# Patient Record
Sex: FEMALE | Race: WHITE | NOT HISPANIC OR LATINO | Employment: FULL TIME | URBAN - METROPOLITAN AREA
[De-identification: names, ages, dates, MRNs, and addresses within clinical notes are randomized per-mention and may not be internally consistent; named-entity substitution may affect disease eponyms.]

---

## 2017-01-24 ENCOUNTER — GENERIC CONVERSION - ENCOUNTER (OUTPATIENT)
Dept: OTHER | Facility: OTHER | Age: 57
End: 2017-01-24

## 2017-02-20 ENCOUNTER — ALLSCRIPTS OFFICE VISIT (OUTPATIENT)
Dept: OTHER | Facility: OTHER | Age: 57
End: 2017-02-20

## 2017-03-03 ENCOUNTER — GENERIC CONVERSION - ENCOUNTER (OUTPATIENT)
Dept: OTHER | Facility: OTHER | Age: 57
End: 2017-03-03

## 2017-03-29 ENCOUNTER — TRANSCRIBE ORDERS (OUTPATIENT)
Dept: ADMINISTRATIVE | Facility: HOSPITAL | Age: 57
End: 2017-03-29

## 2017-03-29 ENCOUNTER — APPOINTMENT (OUTPATIENT)
Dept: LAB | Facility: MEDICAL CENTER | Age: 57
End: 2017-03-29
Payer: COMMERCIAL

## 2017-03-29 DIAGNOSIS — M81.8 IDIOPATHIC OSTEOPOROSIS: Primary | ICD-10-CM

## 2017-03-29 DIAGNOSIS — M81.8 IDIOPATHIC OSTEOPOROSIS: ICD-10-CM

## 2017-03-29 LAB
25(OH)D3 SERPL-MCNC: 18.5 NG/ML (ref 30–100)
ALBUMIN SERPL BCP-MCNC: 4.1 G/DL (ref 3.5–5)
ALP SERPL-CCNC: 80 U/L (ref 46–116)
ALT SERPL W P-5'-P-CCNC: 26 U/L (ref 12–78)
ANION GAP SERPL CALCULATED.3IONS-SCNC: 6 MMOL/L (ref 4–13)
AST SERPL W P-5'-P-CCNC: 20 U/L (ref 5–45)
BILIRUB SERPL-MCNC: 0.64 MG/DL (ref 0.2–1)
BUN SERPL-MCNC: 20 MG/DL (ref 5–25)
CALCIUM SERPL-MCNC: 9.1 MG/DL (ref 8.3–10.1)
CHLORIDE SERPL-SCNC: 102 MMOL/L (ref 100–108)
CO2 SERPL-SCNC: 33 MMOL/L (ref 21–32)
CREAT SERPL-MCNC: 0.94 MG/DL (ref 0.6–1.3)
GFR SERPL CREATININE-BSD FRML MDRD: >60 ML/MIN/1.73SQ M
GLUCOSE SERPL-MCNC: 85 MG/DL (ref 65–140)
POTASSIUM SERPL-SCNC: 4.5 MMOL/L (ref 3.5–5.3)
PROT SERPL-MCNC: 7.7 G/DL (ref 6.4–8.2)
PTH-INTACT SERPL-MCNC: 33.4 PG/ML (ref 14–72)
SODIUM SERPL-SCNC: 141 MMOL/L (ref 136–145)
TSH SERPL DL<=0.05 MIU/L-ACNC: 3.81 UIU/ML (ref 0.36–3.74)

## 2017-03-29 PROCEDURE — 36415 COLL VENOUS BLD VENIPUNCTURE: CPT

## 2017-03-29 PROCEDURE — 83970 ASSAY OF PARATHORMONE: CPT

## 2017-03-29 PROCEDURE — 84443 ASSAY THYROID STIM HORMONE: CPT

## 2017-03-29 PROCEDURE — 80053 COMPREHEN METABOLIC PANEL: CPT

## 2017-03-29 PROCEDURE — 82306 VITAMIN D 25 HYDROXY: CPT

## 2017-04-21 ENCOUNTER — ALLSCRIPTS OFFICE VISIT (OUTPATIENT)
Dept: OTHER | Facility: OTHER | Age: 57
End: 2017-04-21

## 2017-07-21 ENCOUNTER — TRANSCRIBE ORDERS (OUTPATIENT)
Dept: ADMINISTRATIVE | Facility: HOSPITAL | Age: 57
End: 2017-07-21

## 2017-07-21 ENCOUNTER — APPOINTMENT (OUTPATIENT)
Dept: LAB | Facility: MEDICAL CENTER | Age: 57
End: 2017-07-21
Payer: COMMERCIAL

## 2017-07-21 DIAGNOSIS — Z00.8 ENCOUNTER FOR OTHER GENERAL EXAMINATION: Primary | ICD-10-CM

## 2017-07-21 DIAGNOSIS — Z00.8 ENCOUNTER FOR OTHER GENERAL EXAMINATION: ICD-10-CM

## 2017-07-21 LAB
CHOLEST SERPL-MCNC: 264 MG/DL (ref 50–200)
EST. AVERAGE GLUCOSE BLD GHB EST-MCNC: 123 MG/DL
HBA1C MFR BLD: 5.9 % (ref 4.2–6.3)
HDLC SERPL-MCNC: 107 MG/DL (ref 40–60)
LDLC SERPL CALC-MCNC: 144 MG/DL (ref 0–100)
TRIGL SERPL-MCNC: 66 MG/DL

## 2017-07-21 PROCEDURE — 83036 HEMOGLOBIN GLYCOSYLATED A1C: CPT | Performed by: PREVENTIVE MEDICINE

## 2017-07-21 PROCEDURE — 80061 LIPID PANEL: CPT

## 2017-07-21 PROCEDURE — 36415 COLL VENOUS BLD VENIPUNCTURE: CPT | Performed by: PREVENTIVE MEDICINE

## 2018-01-10 NOTE — PROGRESS NOTES
Assessment    1  Acute bronchitis (466 0) (J20 9)    Plan  Acute bronchitis    · Azithromycin 250 MG Oral Tablet; TAKE 2 TABLETS ON DAY 1 THEN TAKE 1  TABLET A DAY FOR 9 days   · PredniSONE 10 MG Oral Tablet; Take 3 po bid for 2 days , then 2 po bid for 2  days, then 1 po bid for 2 days, then 1 qd for 2 days and stop   · Promethazine-DM 6 25-15 MG/5ML Oral Syrup; Take 1 teaspoonful every 6 hours  as needed    Discussion/Summary    If patient is not feeling better in 72 hours, she is to call  Possible side effects of new medications were reviewed with the patient/guardian today  The treatment plan was reviewed with the patient/guardian  The patient/guardian understands and agrees with the treatment plan      Chief Complaint  COUGH/FEVER      History of Present Illness  HPI: 3 days of cough, dry , + chest tightness, fever and sore throat      Review of Systems    Constitutional: fever, but No fever, no chills, feels well, no tiredness, no recent weight gain or loss  ENT: sore throat, but no ear ache, no loss of hearing, no nosebleeds or nasal discharge, no sore throat or hoarseness  Cardiovascular: no complaints of slow or fast heart rate, no chest pain, no palpitations, no leg claudication or lower extremity edema  Respiratory: cough and dry cough and chest tightness, but no complaints of shortness of breath, no wheezing, no dyspnea on exertion, no orthopnea or PND  Breasts: no complaints of breast pain, breast lump or nipple discharge  Gastrointestinal: no complaints of abdominal pain, no constipation, no nausea or diarrhea, no vomiting, no bloody stools  Genitourinary: no complaints of dysuria, no incontinence, no pelvic pain, no dysmenorrhea, no vaginal discharge or abnormal vaginal bleeding  Musculoskeletal: no complaints of arthralgia, no myalgia, no joint swelling or stiffness, no limb pain or swelling     Integumentary: no complaints of skin rash or lesion, no itching or dry skin, no skin wounds  Neurological: no complaints of headache, no confusion, no numbness or tingling, no dizziness or fainting  Active Problems    1  Acute bronchitis (466 0) (J20 9)   2  Acute dysfunction of left Eustachian tube (381 81) (H69 82)   3  Acute sinusitis (461 9) (J01 90)   4  Anxiety (300 00) (F41 9)   5  Aphthous ulcer (528 2) (K12 0)   6  Arthralgia (719 40) (M25 50)   7  Cough, persistent (786 2) (R05)   8  Fatigue (780 79) (R53 83)   9  Fracture of right foot (825 20) (S92 901A)   10  Hypothyroidism (244 9) (E03 9)   11  Myalgia (729 1) (M79 1)   12  Osteoporosis (733 00) (M81 0)   13  Rhus dermatitis (692 6) (L23 7)   14  Right shoulder tendinitis (726 10) (M75 81)   15  Thyroiditis (245 9) (E06 9)   16  Thyromegaly (240 9) (E04 9)   17  Vaginal candidiasis (112 1) (B37 3)   18  Visit for pre-operative examination (V72 84) (Z01 818)   19  Vitamin D deficiency (268 9) (E55 9)    Past Medical History    1  History of Cataract, right (366 9) (H26 9)  Active Problems And Past Medical History Reviewed: The active problems and past medical history were reviewed and updated today  Family History    1  Family history of cardiac disorder (V17 49) (Z82 49)    2  Family history of Colon Cancer (V16 0)   3  Family history of Heart Disease (V17 49)  Family History Reviewed: The family history was reviewed and updated today  Social History    · Alcohol Use (History)   · Daily Coffee Consumption (___ Cups/Day)   · Denied: History of Daily Cola Consumption (___ Cans/Day)   · Denied: History of Daily Tea Consumption (___ Cups/Day)   · Never A Smoker  The social history was reviewed and updated today  The social history was reviewed and is unchanged  Surgical History    1  History of Eye Surgery   2  History of Foot Surgery   3  History of Jaw Surgery   4  History of Tonsillectomy  Surgical History Reviewed: The surgical history was reviewed and updated today  Current Meds   1   BD Pen Needle Mini U/F 31G X 5 MM Miscellaneous; USE AS DIRECTED; Therapy: [id number] to (Allen Solders)  Requested for: 05KXW6123; Last   Rx:07Jan2016 Ordered   2  Forteo 600 MCG/2 4ML Subcutaneous Solution; INJECT 20MCG SUBCUTANEOUSLY   ONE TIME DAILY (DISCARD PEN 28 DAYS AFTERINITIAL USE); Therapy: [id number] to (Last Rx:07Jan2016) Ordered   3  Levothyroxine Sodium 125 MCG Oral Tablet; Take 1 tablet daily Recorded   4  Sertraline HCl - 100 MG Oral Tablet; take one tablet by mouth every day; Therapy: [id number] to (Last Rx:19Mar2015)  Requested for: 94ZNE7042 Ordered    The medication list was reviewed and updated today  Allergies    1  No Known Drug Allergies    Vitals   Recorded: 27EXM0059 10:50AM   Temperature 101 4 F    Heart Rate 100    Respiration 16    Blood Pressure 72 mm Hg 118 mm Hg   Height 5 ft 2 5 in    Weight 145 lb     BMI Calculated 26 1 kg/m2    BSA Calculated 1 68 m2      Physical Exam    Constitutional   General appearance: No acute distress, well appearing and well nourished  Eyes   Conjunctiva and lids: No swelling, erythema or discharge  Pupils and irises: Equal, round and reactive to light  Ears, Nose, Mouth, and Throat   External inspection of ears and nose: Normal     Otoscopic examination: Abnormal   TM are dull and erythematous bilaterally  Nasal mucosa, septum, and turbinates: Abnormal   + turbine injection  Oropharynx: Abnormal   positive erythema the posterior pharynx with postnasal drip  Pulmonary   Respiratory effort: No increased work of breathing or signs of respiratory distress  Auscultation of lungs: Abnormal   get or rhonchi bilaterally  Cardiovascular   Auscultation of heart: Normal rate and rhythm, normal S1 and S2, without murmurs  Examination of extremities for edema and/or varicosities: Normal     Abdomen   Liver and spleen: No hepatomegaly or splenomegaly      Lymphatic   Palpation of lymph nodes in neck: Abnormal   positive anterior cervical lymphadenopathy  Musculoskeletal   Digits and nails: Normal without clubbing or cyanosis  Skin   Skin and subcutaneous tissue: Normal without rashes or lesions  Neurologic   Reflexes: 2+ and symmetric      Psychiatric   Orientation to person, place, and time: Normal     Mood and affect: Normal          Results/Data  PHQ-2 Adult Depression Screening [id number] 10:54AM User, s     Test Name Result Flag Reference   PHQ-2 Adult Depression Score 0     Q1: 0, Q2: 0   PHQ-2 Adult Depression Screening Negative         Signatures   Electronically signed by : Mat Thomas DO; Feb  3 2016 12:58PM EST                       (Author)

## 2018-01-12 VITALS
WEIGHT: 140 LBS | HEIGHT: 63 IN | TEMPERATURE: 98.1 F | HEART RATE: 68 BPM | DIASTOLIC BLOOD PRESSURE: 70 MMHG | BODY MASS INDEX: 24.8 KG/M2 | SYSTOLIC BLOOD PRESSURE: 116 MMHG

## 2018-01-12 VITALS
DIASTOLIC BLOOD PRESSURE: 80 MMHG | HEART RATE: 70 BPM | HEIGHT: 63 IN | WEIGHT: 138.13 LBS | SYSTOLIC BLOOD PRESSURE: 128 MMHG | RESPIRATION RATE: 16 BRPM | TEMPERATURE: 96.7 F | BODY MASS INDEX: 24.47 KG/M2

## 2018-02-20 ENCOUNTER — OFFICE VISIT (OUTPATIENT)
Dept: FAMILY MEDICINE CLINIC | Facility: CLINIC | Age: 58
End: 2018-02-20
Payer: COMMERCIAL

## 2018-02-20 VITALS
DIASTOLIC BLOOD PRESSURE: 72 MMHG | BODY MASS INDEX: 24.17 KG/M2 | TEMPERATURE: 98.6 F | WEIGHT: 141.6 LBS | HEART RATE: 78 BPM | SYSTOLIC BLOOD PRESSURE: 110 MMHG | HEIGHT: 64 IN

## 2018-02-20 DIAGNOSIS — H69.83 EUSTACHIAN TUBE DYSFUNCTION, BILATERAL: ICD-10-CM

## 2018-02-20 DIAGNOSIS — H69.83 EUSTACHIAN TUBE DYSFUNCTION, BILATERAL: Primary | ICD-10-CM

## 2018-02-20 PROCEDURE — 99213 OFFICE O/P EST LOW 20 MIN: CPT | Performed by: FAMILY MEDICINE

## 2018-02-20 RX ORDER — DOXYCYCLINE HYCLATE 100 MG/1
100 CAPSULE ORAL EVERY 12 HOURS SCHEDULED
Qty: 20 CAPSULE | Refills: 0 | Status: SHIPPED | OUTPATIENT
Start: 2018-02-20 | End: 2018-02-20 | Stop reason: SDUPTHER

## 2018-02-20 RX ORDER — PREDNISONE 10 MG/1
TABLET ORAL
Qty: 26 TABLET | Refills: 0 | Status: SHIPPED | OUTPATIENT
Start: 2018-02-20 | End: 2018-02-20 | Stop reason: SDUPTHER

## 2018-02-20 RX ORDER — DOXYCYCLINE HYCLATE 100 MG/1
100 CAPSULE ORAL EVERY 12 HOURS SCHEDULED
Qty: 20 CAPSULE | Refills: 0 | Status: SHIPPED | OUTPATIENT
Start: 2018-02-20 | End: 2018-03-02

## 2018-02-20 RX ORDER — PREDNISONE 10 MG/1
TABLET ORAL
Qty: 26 TABLET | Refills: 0 | Status: SHIPPED | OUTPATIENT
Start: 2018-02-20 | End: 2018-05-04 | Stop reason: ALTCHOICE

## 2018-02-20 NOTE — PROGRESS NOTES
Patient ID: Julio Cesar Sanders is a 62 y o  female  HPI: 62 y  o female presenting with symptoms of ear pressure, crackling of ears and dizziness associated with positional changes  She ws seen at a walk in center a week ago and given flonase and told to ttake an otc antihistamine with little relief  SUBJECTIVE    No family history on file  Social History     Social History    Marital status: /Civil Union     Spouse name: N/A    Number of children: N/A    Years of education: N/A     Occupational History    Not on file  Social History Main Topics    Smoking status: Not on file    Smokeless tobacco: Not on file    Alcohol use Not on file    Drug use: Unknown    Sexual activity: Not on file     Other Topics Concern    Not on file     Social History Narrative    No narrative on file     No past medical history on file  No past surgical history on file    No Known Allergies    Current Outpatient Prescriptions:     levothyroxine 125 mcg tablet, Take 125 mcg by mouth , Disp: , Rfl:     sertraline (ZOLOFT) 100 mg tablet, Take 100 mg by mouth , Disp: , Rfl:     doxycycline hyclate (VIBRAMYCIN) 100 mg capsule, Take 1 capsule (100 mg total) by mouth every 12 (twelve) hours for 10 days, Disp: 20 capsule, Rfl: 0    predniSONE 10 mg tablet, 3 tabs po bid x2 days, then 2 tabs po bid x2 days, then 1 tab bid x2 days, then 1 daily until done , Disp: 26 tablet, Rfl: 0    Review of Systems  Constitutional:     Denies fever, chills, fatigue, weakness ,weight loss, weight gain       ENT: Denies earache, loss of hearing, nosebleed, nasal discharge,nasal congestion, sore throat,hoarseness, but complains of ear pressure,and crackling    Pulmonary: Denies shortness of breath ,cough , dyspnea on exertionon, orthopnea , PND   Cardiovascular:  Denies bradycardia , tachycardia ,palpations, lower extremity, edema leg, claudication  Breast:  Denies new or changing breast lumps,  nipple discharge, nipple changes,  Abdomen:  Denies abdominal pain , anorexia ,indigestion, nausea ,vomiting, constipation , diarrhea  Musculoskeletal: Denies myalgias, arthralgias, joint swelling, joint stiffness ,limb pain, limb swelling  Lymph:denies swollen glands  Gu: no dysuria or urinary frequency  Skin: Denies skin rash, skin lesion, skin wound, itching,dry skin  Neuro: Denies headache, numbness, tingling, confusion, loss of consciousness, but complains of postitional vertigo  Psychiatric: Denies feelings of depression, suicidal ideation, anxiety, sleep disturbances    OBJECTIVE    Constitutional:   NAD, well appearing and well nourished      ENT:   Conjunctiva and lids: no injection, edema, or discharge     Pupils and iris: LEXIE bilaterally    External inspection of ears and nose: normal without deformities or discharge  Otoscopic exam: Canals patent with tm dull, Right tm with  Erythema and  large effusions noted bilaterally  ENasal mucosa, septum and turbinates: Turbinae injection with discharge   Oropharynx:  Moist mucosa, normal tongue and tonsils without lesions  Erythema and injection  of post pharynx with pnd      Pulmonary:Respiratory effort normal rate and rhythm, no increased work of breathing   Auscultation of lungs:  Clear bilaterally with no adventitious breath sounds       Cardiovascular: regular rate and rhythm, S1 and S2, no murmur, no edema and/or varicosities of LE      Abdomen: Soft and non-distended     Positive bowel sounds      No heptomegaly or splenomegaly      Lymphatic:+ Anterior cervical lymphadenopathy         Muscskeletal:  Gait and station: Normal gait      Digits and nails normal without clubbing or cyanosis       Inspection/palpation of joints, bones, and muscles:  No joint tenderness, swelling, full active and passive range of motion       Gu: no suprabubic tenderness, CVA tenderness or urethral discharge  Skin: Normal skin turgor and no rashes      Neuro:       Normal reflexes       Psych: alert and oriented to person, place and time     normal mood and affect       Assessment/Plan:Diagnoses and all orders for this visit:    Eustachian tube dysfunction, bilateral  -     Discontinue: predniSONE 10 mg tablet; 3 tabs po bid x2 days, then 2 tabs po bid x2 days, then 1 tab bid x2 days, then 1 daily until done  -     Discontinue: doxycycline hyclate (VIBRAMYCIN) 100 mg capsule; Take 1 capsule (100 mg total) by mouth every 12 (twelve) hours for 10 days        Reviewed with patient plan to treat with prednisone and doxycyline      Patient instructed to call in 72 hours if not feeling better or if symptoms worsen

## 2018-03-18 DIAGNOSIS — E03.9 HYPOTHYROIDISM, UNSPECIFIED TYPE: Primary | ICD-10-CM

## 2018-03-18 RX ORDER — LEVOTHYROXINE SODIUM 0.12 MG/1
TABLET ORAL
Qty: 90 TABLET | Refills: 0 | Status: SHIPPED | OUTPATIENT
Start: 2018-03-18 | End: 2018-03-19 | Stop reason: SDUPTHER

## 2018-03-19 DIAGNOSIS — E03.9 HYPOTHYROIDISM, UNSPECIFIED TYPE: ICD-10-CM

## 2018-03-19 RX ORDER — LEVOTHYROXINE SODIUM 0.12 MG/1
TABLET ORAL
Qty: 90 TABLET | Refills: 0 | Status: SHIPPED | OUTPATIENT
Start: 2018-03-19 | End: 2018-11-26 | Stop reason: SDUPTHER

## 2018-04-21 ENCOUNTER — OFFICE VISIT (OUTPATIENT)
Dept: URGENT CARE | Facility: MEDICAL CENTER | Age: 58
End: 2018-04-21
Payer: COMMERCIAL

## 2018-04-21 VITALS
OXYGEN SATURATION: 100 % | WEIGHT: 144 LBS | HEART RATE: 87 BPM | DIASTOLIC BLOOD PRESSURE: 72 MMHG | BODY MASS INDEX: 25.11 KG/M2 | RESPIRATION RATE: 16 BRPM | SYSTOLIC BLOOD PRESSURE: 116 MMHG | TEMPERATURE: 97.9 F

## 2018-04-21 DIAGNOSIS — J06.9 ACUTE URI: Primary | ICD-10-CM

## 2018-04-21 PROCEDURE — S9088 SERVICES PROVIDED IN URGENT: HCPCS | Performed by: PHYSICIAN ASSISTANT

## 2018-04-21 PROCEDURE — 99203 OFFICE O/P NEW LOW 30 MIN: CPT | Performed by: PHYSICIAN ASSISTANT

## 2018-04-21 RX ORDER — AZITHROMYCIN 250 MG/1
TABLET, FILM COATED ORAL
Qty: 6 TABLET | Refills: 0 | Status: SHIPPED | OUTPATIENT
Start: 2018-04-21 | End: 2018-04-25

## 2018-04-21 NOTE — PROGRESS NOTES
[facility]        NAME: Didier Hoffman is a 62 y o  female  : 1960    MRN: [id number]    Assessment and Plan   Acute URI [J06 9]  1  Acute URI  azithromycin (ZITHROMAX) 250 mg tablet         Patient Instructions     Take antibiotic as directed  Eat yogurt to avoid GI upset  Take claritin as directed  Increase fluid intake  Use humidifier in bedroom  Follow up with PCP in 3-5 days  Proceed to  ER if symptoms worsen  Chief Complaint     Chief Complaint   Patient presents with    Cough     course with production that started on monday          History of Present Illness       Cough   This is a new problem  The current episode started in the past 7 days  The problem has been unchanged  The problem occurs constantly  The cough is productive of sputum  Associated symptoms include ear congestion, nasal congestion, postnasal drip, rhinorrhea and a sore throat  Pertinent negatives include no chest pain, chills, ear pain, fever, headaches, myalgias, rash, shortness of breath or wheezing  The symptoms are aggravated by lying down  Treatments tried: antihistamines  The treatment provided mild relief  Her past medical history is significant for bronchitis  There is no history of asthma, bronchiectasis, COPD, emphysema, environmental allergies or pneumonia  Review of Systems   Review of Systems   Constitutional: Negative for chills, fatigue and fever  HENT: Positive for postnasal drip, rhinorrhea and sore throat  Negative for congestion, ear pain, hearing loss, sinus pain and sinus pressure  Eyes: Negative for pain and discharge  Respiratory: Positive for cough  Negative for chest tightness, shortness of breath and wheezing  Cardiovascular: Negative for chest pain  Gastrointestinal: Negative for abdominal pain, constipation, nausea and vomiting  Genitourinary: Negative for difficulty urinating  Musculoskeletal: Negative for arthralgias and myalgias  Skin: Negative for rash  Allergic/Immunologic: Negative for environmental allergies  Neurological: Negative for dizziness and headaches  Psychiatric/Behavioral: Negative for behavioral problems  Current Medications       Current Outpatient Prescriptions:     levothyroxine 125 mcg tablet, TAKE ONE TABLET BY MOUTH EVERY DAY, Disp: 90 tablet, Rfl: 0    sertraline (ZOLOFT) 100 mg tablet, Take 100 mg by mouth , Disp: , Rfl:     azithromycin (ZITHROMAX) 250 mg tablet, Take 2 tablets today then 1 tablet daily x 4 days, Disp: 6 tablet, Rfl: 0    predniSONE 10 mg tablet, 3 tabs po bid x2 days, then 2 tabs po bid x2 days, then 1 tab bid x2 days, then 1 daily until done , Disp: 26 tablet, Rfl: 0    Current Allergies     Allergies as of 04/21/2018    (No Known Allergies)            The following portions of the patient's history were reviewed and updated as appropriate: allergies, current medications, past family history, past medical history, past social history, past surgical history and problem list      Past Medical History:   Diagnosis Date    Detached retina, right     OCD (obsessive compulsive disorder)     Seasonal allergies     Wrist fracture        Past Surgical History:   Procedure Laterality Date    CATARACT EXTRACTION      TONSILLECTOMY         No family history on file  Medications have been verified  Objective   /72   Pulse 87   Temp 97 9 °F (36 6 °C) (Temporal)   Resp 16   Wt 65 3 kg (144 lb)   SpO2 100%   BMI 25 11 kg/m²        Physical Exam     Physical Exam   Constitutional: She is oriented to person, place, and time  She appears well-developed and well-nourished  HENT:   Right Ear: Tympanic membrane and external ear normal    Left Ear: Tympanic membrane and external ear normal    Nose: Mucosal edema and rhinorrhea present  Right sinus exhibits frontal sinus tenderness  Right sinus exhibits no maxillary sinus tenderness   Left sinus exhibits no maxillary sinus tenderness and no frontal sinus tenderness  Mouth/Throat: Uvula is midline and mucous membranes are normal  Posterior oropharyngeal edema and posterior oropharyngeal erythema present  No oropharyngeal exudate or tonsillar abscesses  Neck: Normal range of motion  No edema present  Cardiovascular: Normal rate, regular rhythm, S1 normal, S2 normal and normal heart sounds  No murmur heard  Pulmonary/Chest: Effort normal and breath sounds normal  No respiratory distress  She has no wheezes  She has no rales  She exhibits no tenderness  Lymphadenopathy:     She has no cervical adenopathy  Neurological: She is alert and oriented to person, place, and time  Skin: Skin is warm, dry and intact  No rash noted  Psychiatric: She has a normal mood and affect  Her speech is normal and behavior is normal    Nursing note and vitals reviewed

## 2018-04-21 NOTE — PATIENT INSTRUCTIONS
Take antibiotic as directed  Eat yogurt to avoid GI upset  Take claritin as directed  Increase fluid intake  Use humidifier in bedroom  Upper Respiratory Infection   WHAT YOU NEED TO KNOW:   An upper respiratory infection is also called the common cold  It is an infection that can affect your nose, throat, ears, and sinuses  For healthy people, the common cold is usually not serious and does not need special treatment  Cold symptoms are usually worst for the first 3 to 5 days  Most people get better in 7 to 14 days  You may continue to cough for 2 to 3 weeks  Colds are caused by viruses and do not get better with antibiotics  DISCHARGE INSTRUCTIONS:   Return to the emergency department if:   · You have chest pain or trouble breathing  Contact your healthcare provider if:   · You have a fever over 102ºF (39°C)  · Your sore throat gets worse or you see white or yellow spots in your throat  · Your symptoms get worse after 3 to 5 days or your cold is not better in 14 days  · You have a rash anywhere on your skin  · You have large, tender lumps in your neck  · You have thick, green or yellow drainage from your nose  · You cough up thick yellow, green, or bloody mucus  · You have vomiting for more than 24 hours and cannot keep fluids down  · You have a bad earache  · You have questions or concerns about your condition or care  Medicines: You may need any of the following:  · Decongestants  help reduce nasal congestion and help you breathe more easily  If you take decongestant pills, they may make you feel restless or cause problems with your sleep  Do not use decongestant sprays for more than a few days  · Cough suppressants  help reduce coughing  Ask your healthcare provider which type of cough medicine is best for you  · NSAIDs , such as ibuprofen, help decrease swelling, pain, and fever  NSAIDs can cause stomach bleeding or kidney problems in certain people   If you take blood thinner medicine, always ask your healthcare provider if NSAIDs are safe for you  Always read the medicine label and follow directions  · Acetaminophen  decreases pain and fever  It is available without a doctor's order  Ask how much to take and how often to take it  Follow directions  Read the labels of all other medicines you are using to see if they also contain acetaminophen, or ask your doctor or pharmacist  Acetaminophen can cause liver damage if not taken correctly  Do not use more than 4 grams (4,000 milligrams) total of acetaminophen in one day  · Take your medicine as directed  Contact your healthcare provider if you think your medicine is not helping or if you have side effects  Tell him or her if you are allergic to any medicine  Keep a list of the medicines, vitamins, and herbs you take  Include the amounts, and when and why you take them  Bring the list or the pill bottles to follow-up visits  Carry your medicine list with you in case of an emergency  Follow up with your healthcare provider as directed:  Write down your questions so you remember to ask them during your visits  Self-care:   · Rest as much as possible  Slowly start to do more each day  · Drink more liquids as directed  Liquids will help thin and loosen mucus so you can cough it up  Liquids will also help prevent dehydration  Liquids that help prevent dehydration include water, fruit juice, and broth  Do not drink liquids that contain caffeine  Caffeine can increase your risk for dehydration  Ask your healthcare provider how much liquid to drink each day  · Soothe a sore throat  Gargle with warm salt water  This helps your sore throat feel better  Make salt water by dissolving ¼ teaspoon salt in 1 cup warm water  You may also suck on hard candy or throat lozenges  You may use a sore throat spray  · Use a humidifier or vaporizer    Use a cool mist humidifier or a vaporizer to increase air moisture in your home  This may make it easier for you to breathe and help decrease your cough  · Use saline nasal drops as directed  These help relieve congestion  · Apply petroleum-based jelly around the outside of your nostrils  This can decrease irritation from blowing your nose  · Do not smoke  Nicotine and other chemicals in cigarettes and cigars can make your symptoms worse  They can also cause infections such as bronchitis or pneumonia  Ask your healthcare provider for information if you currently smoke and need help to quit  E-cigarettes or smokeless tobacco still contain nicotine  Talk to your healthcare provider before you use these products  Prevent spreading your cold to others:   · Try to stay away from other people during the first 2 to 3 days of your cold when it is more easily spread  · Do not share food or drinks  · Do not share hand towels with household members  · Wash your hands often, especially after you blow your nose  Turn away from other people and cover your mouth and nose with a tissue when you sneeze or cough  © 2017 [facility] Information is for End User's use only and may not be sold, redistributed or otherwise used for commercial purposes  All illustrations and images included in CareNotes® are the copyrighted property of [employer]  or [employer]  The above information is an  only  It is not intended as medical advice for individual conditions or treatments  Talk to your doctor, nurse or pharmacist before following any medical regimen to see if it is safe and effective for you

## 2018-04-28 DIAGNOSIS — F32.A DEPRESSION, UNSPECIFIED DEPRESSION TYPE: Primary | ICD-10-CM

## 2018-04-29 RX ORDER — SERTRALINE HYDROCHLORIDE 100 MG/1
TABLET, FILM COATED ORAL
Qty: 90 TABLET | Refills: 0 | Status: SHIPPED | OUTPATIENT
Start: 2018-04-29 | End: 2018-04-30 | Stop reason: SDUPTHER

## 2018-04-30 DIAGNOSIS — F32.A DEPRESSION, UNSPECIFIED DEPRESSION TYPE: ICD-10-CM

## 2018-04-30 RX ORDER — SERTRALINE HYDROCHLORIDE 100 MG/1
TABLET, FILM COATED ORAL
Qty: 90 TABLET | Refills: 0 | Status: SHIPPED | OUTPATIENT
Start: 2018-04-30 | End: 2018-12-20 | Stop reason: SDUPTHER

## 2018-05-02 ENCOUNTER — TELEPHONE (OUTPATIENT)
Dept: FAMILY MEDICINE CLINIC | Facility: CLINIC | Age: 58
End: 2018-05-02

## 2018-05-04 ENCOUNTER — OFFICE VISIT (OUTPATIENT)
Dept: FAMILY MEDICINE CLINIC | Facility: CLINIC | Age: 58
End: 2018-05-04
Payer: COMMERCIAL

## 2018-05-04 VITALS
SYSTOLIC BLOOD PRESSURE: 102 MMHG | TEMPERATURE: 98.8 F | HEART RATE: 76 BPM | WEIGHT: 143.6 LBS | BODY MASS INDEX: 24.52 KG/M2 | HEIGHT: 64 IN | DIASTOLIC BLOOD PRESSURE: 64 MMHG

## 2018-05-04 DIAGNOSIS — L72.9 CYST OF SKIN: Primary | ICD-10-CM

## 2018-05-04 PROCEDURE — 99213 OFFICE O/P EST LOW 20 MIN: CPT | Performed by: NURSE PRACTITIONER

## 2018-05-04 PROCEDURE — 3008F BODY MASS INDEX DOCD: CPT | Performed by: NURSE PRACTITIONER

## 2018-05-04 RX ORDER — PREDNISONE 10 MG/1
TABLET ORAL
Qty: 26 TABLET | Refills: 0 | Status: SHIPPED | OUTPATIENT
Start: 2018-05-04 | End: 2019-02-21 | Stop reason: ALTCHOICE

## 2018-05-04 RX ORDER — AMOXICILLIN AND CLAVULANATE POTASSIUM 875; 125 MG/1; MG/1
1 TABLET, FILM COATED ORAL EVERY 12 HOURS SCHEDULED
Qty: 10 TABLET | Refills: 0 | Status: SHIPPED | OUTPATIENT
Start: 2018-05-04 | End: 2018-05-14

## 2018-05-04 RX ORDER — LEVOTHYROXINE SODIUM 0.12 MG/1
TABLET ORAL
COMMUNITY
End: 2018-05-04 | Stop reason: SDUPTHER

## 2018-05-04 RX ORDER — SERTRALINE HYDROCHLORIDE 100 MG/1
TABLET, FILM COATED ORAL
COMMUNITY
End: 2018-05-04 | Stop reason: ALTCHOICE

## 2018-05-04 NOTE — PROGRESS NOTES
Patient ID: Gary Bingham is a 62 y o  female  HPI: 62 y  o female presenting with small rounded lump on posterior arm near elbow  The lump was first noticed on 04/28/18 and painless unless patient accidentally bumps it  Patient does not recall traumata or being bite by an insect  Patient denies using any OTC medications to treat at this time  SUBJECTIVE    No family history on file  Social History     Social History    Marital status: /Civil Union     Spouse name: N/A    Number of children: N/A    Years of education: N/A     Occupational History    Not on file       Social History Main Topics    Smoking status: Never Smoker    Smokeless tobacco: Not on file    Alcohol use Not on file    Drug use: Unknown    Sexual activity: Not on file     Other Topics Concern    Not on file     Social History Narrative    No narrative on file     Past Medical History:   Diagnosis Date    Detached retina, right     OCD (obsessive compulsive disorder)     Seasonal allergies     Wrist fracture      Past Surgical History:   Procedure Laterality Date    CATARACT EXTRACTION      TONSILLECTOMY       No Known Allergies    Current Outpatient Prescriptions:     levothyroxine 125 mcg tablet, TAKE ONE TABLET BY MOUTH EVERY DAY, Disp: 90 tablet, Rfl: 0    sertraline (ZOLOFT) 100 mg tablet, TAKE ONE TABLET BY MOUTH EVERY DAY, Disp: 90 tablet, Rfl: 0    Review of Systems    Consitutional:  Denies chills, fatigue, fever and malaise   ENT:  Denies eye pain, ear pain/pressure, nasal discharge, nasal congestion, post nasal drip, sore throat, hoarseness, itchy/watery eyes and sneezing   Pulmonary:  Denies cough, shortness of breath, dyspnea on exertion    Cardiovascular:  Denies chest pain/pressure   Hematology/Lymphatics:   Denies bruising, night sweats, pallor, swollen lymph nodes   Musculoskeletal:  Denies gait disturbance, myalgia,  arthalgia or muscle weakness    Integumentary:  Positive for round raised lump on right arm near elbow  Neurological:  Denies headaches, dizziness, confusion, loss of consciousness or behavioral changes  Psychological:  Denies anxiety, depression or sleep disturbances      OBJECTIVE    /64   Pulse 76   Temp 98 8 °F (37 1 °C)   Ht 5' 3 5" (1 613 m)   Wt 65 1 kg (143 lb 9 6 oz)   BMI 25 04 kg/m²     Constitutional:  Well appearing and in no acute distress  ENT:  ENT exam normal, no neck nodes or sinus tenderness   Pulmonary:  clear to auscultation bilaterally and no crackles, no wheezes, chest expansion normal  Cardiovascular:  S1S2, regular rate and rhythm  Lymphatic:  no lymphadenopathy   Musculoskeletal:  no muscular tenderness noted  Skin:  a well delinated papule located on right posterior forearm,  positive fluid mobility noted with slight tenderness on palpation  No erythema or ecchymosis present and no central indentation noted  Neurologic:  Alert and oriented x 4 and Affect and mood normal      Assessment/Plan:  Diagnoses and all orders for this visit:    Cyst of skin  -     predniSONE 10 mg tablet; 3 tabs twice a day x 2 days, 2 tabs twice a day x2 days, 1 tab twice a day x2 days, 1 tab daily x2 days, than stop  -     amoxicillin-clavulanate (AUGMENTIN) 875-125 mg per tablet;  Take 1 tablet by mouth every 12 (twelve) hours for 10 days    Other orders  -     Discontinue: levothyroxine 125 mcg tablet; levothyroxine 125 mcg tablet  -     Discontinue: sertraline (ZOLOFT) 100 mg tablet; sertraline 100 mg tablet      Cyst of skin  Reviewed with patient plan to treat with 10 day course of Augmentin 875-125 mg twice a day and a tapering dose of prednisone as directed  Patient instructed to call in 72 hours if not feeling better or if symptoms worsen

## 2018-06-30 ENCOUNTER — TRANSCRIBE ORDERS (OUTPATIENT)
Dept: ADMINISTRATIVE | Facility: HOSPITAL | Age: 58
End: 2018-06-30

## 2018-06-30 ENCOUNTER — APPOINTMENT (OUTPATIENT)
Dept: LAB | Facility: MEDICAL CENTER | Age: 58
End: 2018-06-30

## 2018-06-30 DIAGNOSIS — Z00.8 HEALTH EXAMINATION IN POPULATION SURVEY: Primary | ICD-10-CM

## 2018-06-30 DIAGNOSIS — Z00.8 HEALTH EXAMINATION IN POPULATION SURVEY: ICD-10-CM

## 2018-06-30 LAB
CHOLEST SERPL-MCNC: 261 MG/DL (ref 50–200)
EST. AVERAGE GLUCOSE BLD GHB EST-MCNC: 117 MG/DL
HBA1C MFR BLD: 5.7 % (ref 4.2–6.3)
HDLC SERPL-MCNC: 86 MG/DL (ref 40–60)
LDLC SERPL CALC-MCNC: 159 MG/DL (ref 0–100)
NONHDLC SERPL-MCNC: 175 MG/DL
TRIGL SERPL-MCNC: 79 MG/DL

## 2018-06-30 PROCEDURE — 83036 HEMOGLOBIN GLYCOSYLATED A1C: CPT

## 2018-06-30 PROCEDURE — 36415 COLL VENOUS BLD VENIPUNCTURE: CPT

## 2018-06-30 PROCEDURE — 80061 LIPID PANEL: CPT

## 2018-11-08 DIAGNOSIS — Z12.39 SCREENING FOR BREAST CANCER: Primary | ICD-10-CM

## 2018-11-26 DIAGNOSIS — E03.9 HYPOTHYROIDISM, UNSPECIFIED TYPE: ICD-10-CM

## 2018-11-26 RX ORDER — LEVOTHYROXINE SODIUM 0.12 MG/1
TABLET ORAL
Qty: 90 TABLET | Refills: 0 | Status: SHIPPED | OUTPATIENT
Start: 2018-11-26 | End: 2019-04-17 | Stop reason: SDUPTHER

## 2018-12-20 DIAGNOSIS — F32.A DEPRESSION, UNSPECIFIED DEPRESSION TYPE: ICD-10-CM

## 2018-12-20 RX ORDER — SERTRALINE HYDROCHLORIDE 100 MG/1
TABLET, FILM COATED ORAL
Qty: 90 TABLET | Refills: 0 | Status: SHIPPED | OUTPATIENT
Start: 2018-12-20 | End: 2019-06-11 | Stop reason: SDUPTHER

## 2019-02-06 ENCOUNTER — OFFICE VISIT (OUTPATIENT)
Dept: FAMILY MEDICINE CLINIC | Facility: CLINIC | Age: 59
End: 2019-02-06
Payer: COMMERCIAL

## 2019-02-06 VITALS
TEMPERATURE: 97.8 F | HEIGHT: 64 IN | HEART RATE: 78 BPM | SYSTOLIC BLOOD PRESSURE: 118 MMHG | WEIGHT: 144.2 LBS | DIASTOLIC BLOOD PRESSURE: 78 MMHG | BODY MASS INDEX: 24.62 KG/M2

## 2019-02-06 DIAGNOSIS — J01.90 ACUTE SINUSITIS, RECURRENCE NOT SPECIFIED, UNSPECIFIED LOCATION: Primary | ICD-10-CM

## 2019-02-06 DIAGNOSIS — A60.9 HSV (HERPES SIMPLEX VIRUS) ANOGENITAL INFECTION: Primary | ICD-10-CM

## 2019-02-06 PROCEDURE — 99213 OFFICE O/P EST LOW 20 MIN: CPT | Performed by: FAMILY MEDICINE

## 2019-02-06 RX ORDER — VALACYCLOVIR HYDROCHLORIDE 1 G/1
1000 TABLET, FILM COATED ORAL 3 TIMES DAILY
Qty: 30 TABLET | Refills: 3 | Status: SHIPPED | OUTPATIENT
Start: 2019-02-06 | End: 2019-05-23

## 2019-02-06 RX ORDER — AZITHROMYCIN 250 MG/1
TABLET, FILM COATED ORAL
Qty: 6 TABLET | Refills: 0 | Status: SHIPPED | OUTPATIENT
Start: 2019-02-06 | End: 2019-02-10

## 2019-02-06 RX ORDER — PREDNISONE 10 MG/1
TABLET ORAL
Qty: 26 TABLET | Refills: 0 | Status: SHIPPED | OUTPATIENT
Start: 2019-02-06 | End: 2019-02-21 | Stop reason: ALTCHOICE

## 2019-02-06 NOTE — PROGRESS NOTES
Patient ID: Lance Wagner is a 62 y o  female  HPI: 62 y  o female presenting with symptoms of sinus pain,pressure, nasal congestion, pnd dry cough , ear and throat pain  SUBJECTIVE    Family History   Problem Relation Age of Onset    Heart disease Mother         cardiac    Colon cancer Father     Heart disease Father      Social History     Social History    Marital status: /Civil Union     Spouse name: N/A    Number of children: N/A    Years of education: N/A     Occupational History    Not on file       Social History Main Topics    Smoking status: Never Smoker    Smokeless tobacco: Not on file    Alcohol use Yes    Drug use: Unknown    Sexual activity: Not on file     Other Topics Concern    Not on file     Social History Narrative    Daily coffee    Denied cola    Denied tea         Past Medical History:   Diagnosis Date    Cataract, right     Detached retina, right     OCD (obsessive compulsive disorder)     Seasonal allergies     Wrist fracture      Past Surgical History:   Procedure Laterality Date    CATARACT EXTRACTION      EYE SURGERY      FOOT SURGERY      MANDIBLE FRACTURE SURGERY      TONSILLECTOMY       No Known Allergies    Current Outpatient Prescriptions:     levothyroxine 125 mcg tablet, TAKE ONE TABLET BY MOUTH EVERY DAY, Disp: 90 tablet, Rfl: 0    sertraline (ZOLOFT) 100 mg tablet, TAKE ONE TABLET BY MOUTH EVERY DAY, Disp: 90 tablet, Rfl: 0    azithromycin (ZITHROMAX) 250 mg tablet, Take 2 tablets today then 1 tablet daily x 4 days, Disp: 6 tablet, Rfl: 0    predniSONE 10 mg tablet, 3 tabs twice a day x 2 days, 2 tabs twice a day x2 days, 1 tab twice a day x2 days, 1 tab daily x2 days, than stop (Patient not taking: Reported on 2/6/2019 ), Disp: 26 tablet, Rfl: 0    predniSONE 10 mg tablet, 3 tabs po bid x2 days, then 2 tabs po bid x2 days, then 1 tab bid x2 days, then 1 daily until done , Disp: 26 tablet, Rfl: 0    Review of Systems  Constitutional: Denies fever, chills, fatigue, weakness ,weight loss, weight gain      ENT: Denies earache, loss of hearing, nosebleed, nasal discharge,but complains of nasal congestion, sore throat,hoarseness and sinus pain and pressure    Pulmonary: Denies shortness of breath ,cough , dyspnea on exertionon, orthopnea ,+ PND   Cardiovascular:  Denies bradycardia , tachycardia ,palpations, lower extremity, edema leg, claudication  Breast:  Denies new or changing breast lumps,  nipple discharge, nipple changes,  Abdomen:  Denies abdominal pain , anorexia ,indigestion, nausea ,vomiting, constipation , diarrhea  Musculoskeletal: Denies myalgias, arthralgias, joint swelling, joint stiffness ,limb pain, limb swelling  Lymph:+ swollen glands  Gu: no dysuria or urinary frequency  Skin: Denies skin rash, skin lesion, skin wound, itching,dry skin  Neuro: Denies headache, numbness, tingling, confusion, loss of consciousness, dizziness ,vertigo  Psychiatric: Denies feelings of depression, suicidal ideation, anxiety, sleep disturbances    OBJECTIVE  /78 (BP Location: Right arm, Patient Position: Sitting, Cuff Size: Standard)   Pulse 78   Temp 97 8 °F (36 6 °C)   Ht 5' 3 5" (1 613 m)   Wt 65 4 kg (144 lb 3 2 oz)   BMI 25 14 kg/m²   Constitutional:   NAD, well appearing and well nourished      ENT:   Conjunctiva and lids: no injection, edema, or discharge    Pupils and iris: LEXIE bilaterally   External inspection of ears and nose: normal without deformities or discharge  Otoscopic exam: Canals patent ; tm are dull, with with erythem and effusions  ENasal mucosa, septum and turbinates: Turbinae injection with discharge   Oropharynx:  Moist mucosa, normal tongue and tonsils without lesions  Erythema and injection  of post pharynx with pnd      Pulmonary:Respiratory effort normal rate and rhythm, no increased work of breathing   Auscultation of lungs:  Clear bilaterally with no adventitious breath sounds       Cardiovascular: regular rate and rhythm, S1 and S2, no murmur, no edema and/or varicosities of LE      Abdomen: Soft and non-distended    Positive bowel sounds    No heptomegaly or splenomegaly    Lymphatic: Anterior  cervical lymphadenopathy         Muscskeletal:  Gait and station: Normal gait     Digits and nails normal without clubbing or cyanosis     Inspection/palpation of joints, bones, and muscles:  No joint tenderness, swelling, full active and passive range of motion      Gu: no suprabubic tenderness, CVA tenderness or urethral discharge  Skin: Normal skin turgor and no rashes    Neuro:    Normal reflexes   Psych:   alert and oriented to person, place and time  normal mood and affect      Assessment/Plan:Diagnoses and all orders for this visit:    Acute sinusitis, recurrence not specified, unspecified location  -     predniSONE 10 mg tablet; 3 tabs po bid x2 days, then 2 tabs po bid x2 days, then 1 tab bid x2 days, then 1 daily until done  -     azithromycin (ZITHROMAX) 250 mg tablet; Take 2 tablets today then 1 tablet daily x 4 days        Reviewed with patient plan to treat with above plan      Patient instructed to call in 72 hours if not feeling better or if symptoms worsen

## 2019-02-06 NOTE — TELEPHONE ENCOUNTER
Medication Refill:  Valtrex as needed    [facility]   Patient aware to check with pharmacy

## 2019-02-15 ENCOUNTER — OFFICE VISIT (OUTPATIENT)
Dept: FAMILY MEDICINE CLINIC | Facility: CLINIC | Age: 59
End: 2019-02-15
Payer: COMMERCIAL

## 2019-02-15 VITALS
TEMPERATURE: 99.3 F | WEIGHT: 144 LBS | HEART RATE: 78 BPM | DIASTOLIC BLOOD PRESSURE: 84 MMHG | SYSTOLIC BLOOD PRESSURE: 128 MMHG | BODY MASS INDEX: 24.59 KG/M2 | HEIGHT: 64 IN

## 2019-02-15 DIAGNOSIS — H69.83 DYSFUNCTION OF BOTH EUSTACHIAN TUBES: Primary | ICD-10-CM

## 2019-02-15 PROCEDURE — 99213 OFFICE O/P EST LOW 20 MIN: CPT | Performed by: NURSE PRACTITIONER

## 2019-02-15 RX ORDER — AMOXICILLIN 875 MG/1
875 TABLET, COATED ORAL 2 TIMES DAILY
Qty: 20 TABLET | Refills: 0 | Status: SHIPPED | OUTPATIENT
Start: 2019-02-15 | End: 2019-02-25

## 2019-02-15 NOTE — PROGRESS NOTES
Assessment/Plan:     Diagnoses and all orders for this visit:    Dysfunction of both eustachian tubes  -     amoxicillin (AMOXIL) 875 mg tablet; Take 1 tablet (875 mg total) by mouth 2 (two) times a day for 10 days    Discussed with patient plan to treat with 10 day course of amoxicillin 875 mg twice a day  Discussed conservative measures: rest, stay hydrated, slowly change positions and have her blood pressure checked at work a few times to check on normal readings maintained  Patient instructed to call if no improvement in 72 hours or symptoms worsen    Subjective:      Patient ID: Gin Hdz is a 62 y o  female  62 y  o female presenting with an episode of dizziness and lightheadedness with elevated blood pressure at work earlier today  Patient reports that she was seen last week in the office for a sinus infection and was given a tapering dose of prednisone 10 mg along with azithromycin  She reports feeling flashed during the week and just not feeling like her self  Earlier today at work she was not feeling good so she thought she needed to eat something  After eating she still did not feel right and than 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  She had her blood pressure checked and it was 170/90 which is high for her  She went home and called the office for an appointment to make sure nothing serious was occurring             Family History   Problem Relation Age of Onset    Heart disease Mother         cardiac    Colon cancer Father     Heart disease Father      Social History     Socioeconomic History    Marital status: /Civil Union     Spouse name: Not on file    Number of children: Not on file    Years of education: Not on file    Highest education level: Not on file   Occupational History    Not on file   Social Needs    Financial resource strain: Not on file    Food insecurity:     Worry: Not on file     Inability: Not on file   [address] needs: Medical: Not on file     Non-medical: Not on file   Tobacco Use    Smoking status: Never Smoker   Substance and Sexual Activity    Alcohol use:  Yes    Drug use: Not on file    Sexual activity: Not on file   Lifestyle    Physical activity:     Days per week: Not on file     Minutes per session: Not on file    Stress: Not on file   Relationships    Social connections:     Talks on phone: Not on file     Gets together: Not on file     Attends Anglican service: Not on file     Active member of club or organization: Not on file     Attends meetings of clubs or organizations: Not on file     Relationship status: Not on file    Intimate partner violence:     Fear of current or ex partner: Not on file     Emotionally abused: Not on file     Physically abused: Not on file     Forced sexual activity: Not on file   Other Topics Concern    Not on file   Social History Narrative    Daily coffee    Denied cola    Denied tea     Past Medical History:   Diagnosis Date    Cataract, right     Detached retina, right     OCD (obsessive compulsive disorder)     Seasonal allergies     Wrist fracture      Past Surgical History:   Procedure Laterality Date    CATARACT EXTRACTION      EYE SURGERY      FOOT SURGERY      MANDIBLE FRACTURE SURGERY      TONSILLECTOMY       No Known Allergies    Current Outpatient Medications:     amoxicillin (AMOXIL) 875 mg tablet, Take 1 tablet (875 mg total) by mouth 2 (two) times a day for 10 days, Disp: 20 tablet, Rfl: 0    levothyroxine 125 mcg tablet, TAKE ONE TABLET BY MOUTH EVERY DAY, Disp: 90 tablet, Rfl: 0    predniSONE 10 mg tablet, 3 tabs twice a day x 2 days, 2 tabs twice a day x2 days, 1 tab twice a day x2 days, 1 tab daily x2 days, than stop (Patient not taking: Reported on 2/6/2019 ), Disp: 26 tablet, Rfl: 0    predniSONE 10 mg tablet, 3 tabs po bid x2 days, then 2 tabs po bid x2 days, then 1 tab bid x2 days, then 1 daily until done , Disp: 26 tablet, Rfl: 0   sertraline (ZOLOFT) 100 mg tablet, TAKE ONE TABLET BY MOUTH EVERY DAY, Disp: 90 tablet, Rfl: 0    valACYclovir (VALTREX) 1,000 mg tablet, Take 1 tablet (1,000 mg total) by mouth 3 (three) times a day for 10 days, Disp: 30 tablet, Rfl: 3      Review of Systems   Constitutional: Positive for fatigue  HENT: Negative  Eyes: Negative  Respiratory: Negative  Cardiovascular: Negative  Gastrointestinal: Negative  Endocrine: Negative  Musculoskeletal: Negative  Neurological: Positive for dizziness and light-headedness  Hematological: Negative  Psychiatric/Behavioral: Negative  Objective:    /84 (BP Location: Right arm, Patient Position: Sitting, Cuff Size: Standard)   Pulse 78   Temp 99 3 °F (37 4 °C)   Ht 5' 3 5" (1 613 m)   Wt 65 3 kg (144 lb)   BMI 25 11 kg/m² (Reviewed)     Physical Exam   Constitutional: She is oriented to person, place, and time  Vital signs are normal  She appears well-developed and well-nourished  HENT:   Head: Normocephalic and atraumatic  Right Ear: External ear normal  Tympanic membrane is erythematous  Left Ear: External ear normal  Tympanic membrane is erythematous  Nose: Nose normal    Mouth/Throat: Uvula is midline, oropharynx is clear and moist and mucous membranes are normal    Eyes: Pupils are equal, round, and reactive to light  Conjunctivae, EOM and lids are normal    Neck: Trachea normal and normal range of motion  Cardiovascular: Normal rate, regular rhythm and normal heart sounds  Pulmonary/Chest: Effort normal and breath sounds normal    Neurological: She is alert and oriented to person, place, and time  She has normal strength and normal reflexes  No cranial nerve deficit  She displays a negative Romberg sign  Skin: Skin is warm and dry  Psychiatric: She has a normal mood and affect   Her behavior is normal

## 2019-02-18 ENCOUNTER — TELEPHONE (OUTPATIENT)
Dept: FAMILY MEDICINE CLINIC | Facility: CLINIC | Age: 59
End: 2019-02-18

## 2019-02-18 NOTE — TELEPHONE ENCOUNTER
Still not feeling right neck and throat hurt, understands you are out today  We had to cancel her appt for today  Just letting you know, was told to call back

## 2019-02-19 ENCOUNTER — OFFICE VISIT (OUTPATIENT)
Dept: FAMILY MEDICINE CLINIC | Facility: CLINIC | Age: 59
End: 2019-02-19
Payer: COMMERCIAL

## 2019-02-19 VITALS
DIASTOLIC BLOOD PRESSURE: 82 MMHG | SYSTOLIC BLOOD PRESSURE: 124 MMHG | WEIGHT: 144 LBS | TEMPERATURE: 99.4 F | HEART RATE: 76 BPM | HEIGHT: 64 IN | BODY MASS INDEX: 24.59 KG/M2

## 2019-02-19 DIAGNOSIS — E03.9 HYPOTHYROIDISM, UNSPECIFIED TYPE: ICD-10-CM

## 2019-02-19 DIAGNOSIS — R53.83 OTHER FATIGUE: Primary | ICD-10-CM

## 2019-02-19 PROCEDURE — 99213 OFFICE O/P EST LOW 20 MIN: CPT | Performed by: NURSE PRACTITIONER

## 2019-02-19 NOTE — PROGRESS NOTES
Assessment/Plan:     Diagnoses and all orders for this visit:    Other fatigue  -     CBC and differential; Future  -     Comprehensive metabolic panel; Future  -     Vitamin D 25 hydroxy; Future  -     Thyroid Antibodies Panel; Future    Hypothyroidism, unspecified type  -     TSH, 3rd generation with Free T4 reflex; Future        Discussed with patient plan to obtain some lab work to pinpoint potential etiology of continued fatigue and overall feeling of not feeling right  Patient instructed to call if no improvement in 72 hours or symptoms worsen      Subjective:      Patient ID: Anam Barrios is a 62 y o  female  62 y  o female presenting with overall fatigue and a sense of not feeling like herself for the past month  Patient as been recently treated for a acute sinusitis and eustachian tube dysfunction with azithromycin and two tapering course of prednisone 10 mg with many of the symptoms resolved  She reports that she does not feel right and run down  After being seen in the office on 02/15/19 she was thinking that her symptoms reflect the way she felt when her thyroid was not function properly  She is requesting some lab work to be ordered to check for potnetial causes of her overall fatigue feelings  Family History   Problem Relation Age of Onset    Heart disease Mother         cardiac    Colon cancer Father     Heart disease Father      Social History     Socioeconomic History    Marital status: /Civil Union     Spouse name: Not on file    Number of children: Not on file    Years of education: Not on file    Highest education level: Not on file   Occupational History    Not on file   Social Needs    Financial resource strain: Not on file    Food insecurity:     Worry: Not on file     Inability: Not on file    Transportation needs:     Medical: Not on file     Non-medical: Not on file   Tobacco Use    Smoking status: Never Smoker   Substance and Sexual Activity    Alcohol use:  Yes  Drug use: Not on file    Sexual activity: Not on file   Lifestyle    Physical activity:     Days per week: Not on file     Minutes per session: Not on file    Stress: Not on file   Relationships    Social connections:     Talks on phone: Not on file     Gets together: Not on file     Attends Rastafarian service: Not on file     Active member of club or organization: Not on file     Attends meetings of clubs or organizations: Not on file     Relationship status: Not on file    Intimate partner violence:     Fear of current or ex partner: Not on file     Emotionally abused: Not on file     Physically abused: Not on file     Forced sexual activity: Not on file   Other Topics Concern    Not on file   Social History Narrative    Daily coffee    Denied cola    Denied tea     Past Medical History:   Diagnosis Date    Cataract, right     Detached retina, right     OCD (obsessive compulsive disorder)     Seasonal allergies     Wrist fracture      Past Surgical History:   Procedure Laterality Date    CATARACT EXTRACTION      EYE SURGERY      FOOT SURGERY      MANDIBLE FRACTURE SURGERY      TONSILLECTOMY       No Known Allergies    Current Outpatient Medications:     amoxicillin (AMOXIL) 875 mg tablet, Take 1 tablet (875 mg total) by mouth 2 (two) times a day for 10 days, Disp: 20 tablet, Rfl: 0    ergocalciferol (VITAMIN D2) 50,000 units, Take 1 capsule (50,000 Units total) by mouth once a week for 8 doses, Disp: 8 capsule, Rfl: 0    levothyroxine 125 mcg tablet, TAKE ONE TABLET BY MOUTH EVERY DAY, Disp: 90 tablet, Rfl: 0    predniSONE 10 mg tablet, 3 tabs twice a day x 2 days, 2 tabs twice a day x2 days, 1 tab twice a day x2 days, 1 tab daily x2 days, than stop (Patient not taking: Reported on 2/6/2019 ), Disp: 26 tablet, Rfl: 0    predniSONE 10 mg tablet, 3 tabs po bid x2 days, then 2 tabs po bid x2 days, then 1 tab bid x2 days, then 1 daily until done , Disp: 26 tablet, Rfl: 0    sertraline (ZOLOFT) 100 mg tablet, TAKE ONE TABLET BY MOUTH EVERY DAY, Disp: 90 tablet, Rfl: 0    valACYclovir (VALTREX) 1,000 mg tablet, Take 1 tablet (1,000 mg total) by mouth 3 (three) times a day for 10 days, Disp: 30 tablet, Rfl: 3      Review of Systems   Constitutional: Positive for fatigue  HENT: Negative  Eyes: Negative  Respiratory: Negative  Cardiovascular: Negative  Gastrointestinal: Negative  Endocrine: Negative  Musculoskeletal: Positive for myalgias  Neurological: Positive for weakness  Psychiatric/Behavioral: Negative  Objective:    /82 (BP Location: Right arm, Patient Position: Sitting, Cuff Size: Standard)   Pulse 76   Temp 99 4 °F (37 4 °C)   Ht 5' 3 5" (1 613 m)   Wt 65 3 kg (144 lb)   BMI 25 11 kg/m² (Reviewed)     Physical Exam   Constitutional: She is oriented to person, place, and time  Vital signs are normal  She appears well-developed and well-nourished  HENT:   Head: Normocephalic and atraumatic  Right Ear: Tympanic membrane, external ear and ear canal normal    Left Ear: Tympanic membrane, external ear and ear canal normal    Nose: Nose normal    Mouth/Throat: Uvula is midline, oropharynx is clear and moist and mucous membranes are normal    Eyes: Pupils are equal, round, and reactive to light  Conjunctivae, EOM and lids are normal    Neck: Trachea normal and normal range of motion  Cardiovascular: Normal rate, regular rhythm and normal heart sounds  Pulmonary/Chest: Effort normal and breath sounds normal    Abdominal: Soft  Bowel sounds are normal  There is no tenderness  Neurological: She is alert and oriented to person, place, and time  She has normal strength and normal reflexes  No cranial nerve deficit  She displays a negative Romberg sign  Skin: Skin is warm and dry  Capillary refill takes less than 2 seconds  Psychiatric: She has a normal mood and affect   Her behavior is normal

## 2019-02-20 ENCOUNTER — APPOINTMENT (OUTPATIENT)
Dept: LAB | Facility: MEDICAL CENTER | Age: 59
End: 2019-02-20
Payer: COMMERCIAL

## 2019-02-20 DIAGNOSIS — R53.83 OTHER FATIGUE: ICD-10-CM

## 2019-02-20 DIAGNOSIS — E03.9 HYPOTHYROIDISM, UNSPECIFIED TYPE: ICD-10-CM

## 2019-02-20 DIAGNOSIS — E55.9 VITAMIN D DEFICIENCY: Primary | ICD-10-CM

## 2019-02-20 LAB
25(OH)D3 SERPL-MCNC: 19 NG/ML (ref 30–100)
ALBUMIN SERPL BCP-MCNC: 3.9 G/DL (ref 3.5–5)
ALP SERPL-CCNC: 73 U/L (ref 46–116)
ALT SERPL W P-5'-P-CCNC: 26 U/L (ref 12–78)
ANION GAP SERPL CALCULATED.3IONS-SCNC: 8 MMOL/L (ref 4–13)
AST SERPL W P-5'-P-CCNC: 23 U/L (ref 5–45)
BASOPHILS # BLD AUTO: 0.04 THOUSANDS/ΜL (ref 0–0.1)
BASOPHILS NFR BLD AUTO: 1 % (ref 0–1)
BILIRUB SERPL-MCNC: 0.47 MG/DL (ref 0.2–1)
BUN SERPL-MCNC: 18 MG/DL (ref 5–25)
CALCIUM SERPL-MCNC: 8.6 MG/DL (ref 8.3–10.1)
CHLORIDE SERPL-SCNC: 105 MMOL/L (ref 100–108)
CO2 SERPL-SCNC: 28 MMOL/L (ref 21–32)
CREAT SERPL-MCNC: 0.72 MG/DL (ref 0.6–1.3)
EOSINOPHIL # BLD AUTO: 0.2 THOUSAND/ΜL (ref 0–0.61)
EOSINOPHIL NFR BLD AUTO: 3 % (ref 0–6)
ERYTHROCYTE [DISTWIDTH] IN BLOOD BY AUTOMATED COUNT: 13.7 % (ref 11.6–15.1)
GFR SERPL CREATININE-BSD FRML MDRD: 93 ML/MIN/1.73SQ M
GLUCOSE P FAST SERPL-MCNC: 88 MG/DL (ref 65–99)
HCT VFR BLD AUTO: 41.2 % (ref 34.8–46.1)
HGB BLD-MCNC: 12.7 G/DL (ref 11.5–15.4)
IMM GRANULOCYTES # BLD AUTO: 0.02 THOUSAND/UL (ref 0–0.2)
IMM GRANULOCYTES NFR BLD AUTO: 0 % (ref 0–2)
LYMPHOCYTES # BLD AUTO: 1.97 THOUSANDS/ΜL (ref 0.6–4.47)
LYMPHOCYTES NFR BLD AUTO: 29 % (ref 14–44)
MCH RBC QN AUTO: 30.2 PG (ref 26.8–34.3)
MCHC RBC AUTO-ENTMCNC: 30.8 G/DL (ref 31.4–37.4)
MCV RBC AUTO: 98 FL (ref 82–98)
MONOCYTES # BLD AUTO: 0.51 THOUSAND/ΜL (ref 0.17–1.22)
MONOCYTES NFR BLD AUTO: 8 % (ref 4–12)
NEUTROPHILS # BLD AUTO: 4.09 THOUSANDS/ΜL (ref 1.85–7.62)
NEUTS SEG NFR BLD AUTO: 59 % (ref 43–75)
NRBC BLD AUTO-RTO: 0 /100 WBCS
PLATELET # BLD AUTO: 259 THOUSANDS/UL (ref 149–390)
PMV BLD AUTO: 10.1 FL (ref 8.9–12.7)
POTASSIUM SERPL-SCNC: 4.2 MMOL/L (ref 3.5–5.3)
PROT SERPL-MCNC: 7.3 G/DL (ref 6.4–8.2)
RBC # BLD AUTO: 4.2 MILLION/UL (ref 3.81–5.12)
SODIUM SERPL-SCNC: 141 MMOL/L (ref 136–145)
TSH SERPL DL<=0.05 MIU/L-ACNC: 3.37 UIU/ML (ref 0.36–3.74)
WBC # BLD AUTO: 6.83 THOUSAND/UL (ref 4.31–10.16)

## 2019-02-20 PROCEDURE — 86376 MICROSOMAL ANTIBODY EACH: CPT

## 2019-02-20 PROCEDURE — 84443 ASSAY THYROID STIM HORMONE: CPT

## 2019-02-20 PROCEDURE — 86800 THYROGLOBULIN ANTIBODY: CPT

## 2019-02-20 PROCEDURE — 85025 COMPLETE CBC W/AUTO DIFF WBC: CPT

## 2019-02-20 PROCEDURE — 82306 VITAMIN D 25 HYDROXY: CPT

## 2019-02-20 PROCEDURE — 36415 COLL VENOUS BLD VENIPUNCTURE: CPT

## 2019-02-20 PROCEDURE — 80053 COMPREHEN METABOLIC PANEL: CPT

## 2019-02-20 RX ORDER — ERGOCALCIFEROL 1.25 MG/1
50000 CAPSULE ORAL WEEKLY
Qty: 8 CAPSULE | Refills: 0 | Status: SHIPPED | OUTPATIENT
Start: 2019-02-20 | End: 2019-02-21

## 2019-02-21 DIAGNOSIS — E06.9 THYROIDITIS: Primary | ICD-10-CM

## 2019-02-21 DIAGNOSIS — E55.9 VITAMIN D DEFICIENCY: ICD-10-CM

## 2019-02-21 LAB
THYROGLOB AB SERPL-ACNC: 40.8 IU/ML (ref 0–0.9)
THYROPEROXIDASE AB SERPL-ACNC: 361 IU/ML (ref 0–34)

## 2019-02-21 RX ORDER — PREDNISONE 10 MG/1
TABLET ORAL
Qty: 26 TABLET | Refills: 0 | Status: SHIPPED | OUTPATIENT
Start: 2019-02-21 | End: 2019-02-28 | Stop reason: SDUPTHER

## 2019-02-21 RX ORDER — ERGOCALCIFEROL 1.25 MG/1
50000 CAPSULE ORAL WEEKLY
Qty: 8 CAPSULE | Refills: 0 | Status: SHIPPED | OUTPATIENT
Start: 2019-02-21 | End: 2019-05-23

## 2019-02-28 ENCOUNTER — TELEPHONE (OUTPATIENT)
Dept: FAMILY MEDICINE CLINIC | Facility: CLINIC | Age: 59
End: 2019-02-28

## 2019-02-28 DIAGNOSIS — E06.9 THYROIDITIS: ICD-10-CM

## 2019-02-28 RX ORDER — PREDNISONE 10 MG/1
TABLET ORAL
Qty: 26 TABLET | Refills: 0 | Status: SHIPPED | OUTPATIENT
Start: 2019-02-28 | End: 2019-05-08 | Stop reason: ALTCHOICE

## 2019-02-28 NOTE — TELEPHONE ENCOUNTER
Update:  Darlin Romberg will call [facility] to have them pull script from [facility]   Please resend Prednisone to Alexus Mayer, she is there waiting but it looks like it went to [facility]

## 2019-02-28 NOTE — TELEPHONE ENCOUNTER
She has one day of Prednisone left, she is not feeling better, thyroid gland feels the same, pl adv

## 2019-03-06 ENCOUNTER — TELEPHONE (OUTPATIENT)
Dept: FAMILY MEDICINE CLINIC | Facility: CLINIC | Age: 59
End: 2019-03-06

## 2019-03-06 NOTE — TELEPHONE ENCOUNTER
Patient called stating she is still not feeling well and was wondering if she could get an ultrasound done of her thyroid to check if there are any nodules?      Once order is placed please call patient at [phone]

## 2019-03-07 ENCOUNTER — HOSPITAL ENCOUNTER (OUTPATIENT)
Dept: RADIOLOGY | Facility: MEDICAL CENTER | Age: 59
Discharge: HOME | End: 2019-03-07
Payer: COMMERCIAL

## 2019-03-07 DIAGNOSIS — E01.0 THYROMEGALY: Primary | ICD-10-CM

## 2019-03-07 DIAGNOSIS — E01.0 THYROMEGALY: ICD-10-CM

## 2019-03-07 PROCEDURE — 76536 US EXAM OF HEAD AND NECK: CPT

## 2019-03-11 ENCOUNTER — OFFICE VISIT (OUTPATIENT)
Dept: FAMILY MEDICINE CLINIC | Facility: CLINIC | Age: 59
End: 2019-03-11
Payer: COMMERCIAL

## 2019-03-11 VITALS
WEIGHT: 151 LBS | DIASTOLIC BLOOD PRESSURE: 68 MMHG | HEART RATE: 76 BPM | BODY MASS INDEX: 25.78 KG/M2 | TEMPERATURE: 98.5 F | SYSTOLIC BLOOD PRESSURE: 122 MMHG | HEIGHT: 64 IN

## 2019-03-11 DIAGNOSIS — E06.0 ACUTE THYROIDITIS: Primary | ICD-10-CM

## 2019-03-11 PROCEDURE — 1036F TOBACCO NON-USER: CPT | Performed by: FAMILY MEDICINE

## 2019-03-11 PROCEDURE — 3008F BODY MASS INDEX DOCD: CPT | Performed by: FAMILY MEDICINE

## 2019-03-11 PROCEDURE — 99213 OFFICE O/P EST LOW 20 MIN: CPT | Performed by: FAMILY MEDICINE

## 2019-03-11 NOTE — PROGRESS NOTES
Patient ID: Jose Antonio Chan is a 62 y o  female  HPI: 62 y  o female presents to discuss her case of thyroiditis  She has finished her second course of prednisone, but still is having hot flashes and some pain at base of her neck  Her us of thyroid results are not back yet  SUBJECTIVE    Family History   Problem Relation Age of Onset    Heart disease Mother         cardiac    Colon cancer Father     Heart disease Father      Social History     Socioeconomic History    Marital status: /Civil Union     Spouse name: Not on file    Number of children: Not on file    Years of education: Not on file    Highest education level: Not on file   Occupational History    Not on file   Social Needs    Financial resource strain: Not on file    Food insecurity:     Worry: Not on file     Inability: Not on file    Transportation needs:     Medical: Not on file     Non-medical: Not on file   Tobacco Use    Smoking status: Never Smoker   Substance and Sexual Activity    Alcohol use:  Yes    Drug use: Not on file    Sexual activity: Not on file   Lifestyle    Physical activity:     Days per week: Not on file     Minutes per session: Not on file    Stress: Not on file   Relationships    Social connections:     Talks on phone: Not on file     Gets together: Not on file     Attends Scientology service: Not on file     Active member of club or organization: Not on file     Attends meetings of clubs or organizations: Not on file     Relationship status: Not on file    Intimate partner violence:     Fear of current or ex partner: Not on file     Emotionally abused: Not on file     Physically abused: Not on file     Forced sexual activity: Not on file   Other Topics Concern    Not on file   Social History Narrative    Daily coffee    Denied cola    Denied tea     Past Medical History:   Diagnosis Date    Cataract, right     Detached retina, right     OCD (obsessive compulsive disorder)     Seasonal allergies  Wrist fracture      Past Surgical History:   Procedure Laterality Date    CATARACT EXTRACTION      EYE SURGERY      FOOT SURGERY      MANDIBLE FRACTURE SURGERY      TONSILLECTOMY       No Known Allergies    Current Outpatient Medications:     ergocalciferol (VITAMIN D2) 50,000 units, Take 1 capsule (50,000 Units total) by mouth once a week for 8 doses, Disp: 8 capsule, Rfl: 0    levothyroxine 125 mcg tablet, TAKE ONE TABLET BY MOUTH EVERY DAY, Disp: 90 tablet, Rfl: 0    sertraline (ZOLOFT) 100 mg tablet, TAKE ONE TABLET BY MOUTH EVERY DAY, Disp: 90 tablet, Rfl: 0    predniSONE 10 mg tablet, Take 3 tabs twice a day x2 days, then 2 tabs twice a day x2 days, then 1 tab twice a day x2 days, then 1 tablet daily x2 days (Patient not taking: Reported on 3/11/2019), Disp: 26 tablet, Rfl: 0    valACYclovir (VALTREX) 1,000 mg tablet, Take 1 tablet (1,000 mg total) by mouth 3 (three) times a day for 10 days, Disp: 30 tablet, Rfl: 3    Review of Systems  Constitutional:     Denies fever, chills ,fatigue ,weakness ,weight loss, weight gain + hot and cold flashes    ENT: Denies earache ,loss of hearing ,nosebleed, nasal discharge,nasal congestion ,sore throat ,hoarseness+  Pain at base of neck  Pulmonary: Denies shortness of breath ,cough  ,dyspnea on exertion, orthopnea  ,PND   Cardiovascular:  Denies bradycardia , tachycardia  ,palpations, lower extremity edema leg, claudication  Breast:  Denies new or changing breast lumps ,nipple discharge ,nipple changes  Abdomen:  Denies abdominal pain , anorexia , indigestion, nausea, vomiting, constipation, diarrhea  Musculoskeletal: Denies myalgias, arthralgias, joint swelling, joint stiffness , limb pain, limb swelling  Gu: denies dysuria, polyuria  Skin: Denies skin rash, skin lesion, skin wound, itching, dry skin  Neuro: Denies headache, numbness, tingling, confusion, loss of consciousness, dizziness, vertigo  Psychiatric: Denies feelings of depression, suicidal ideation, anxiety, sleep disturbances    OBJECTIVE  /68   Pulse 76   Temp 98 5 °F (36 9 °C)   Ht 5' 3 5" (1 613 m)   Wt 68 5 kg (151 lb)   BMI 26 33 kg/m²   Constitutional:   NAD, well appearing and well nourished      ENT:   Conjunctiva and lids: no injection, edema, or discharge     Pupils and iris: LEXIE bilaterally    External inspection of ears and nose: normal without deformities or discharge  Otoscopic exam: Canals patent without erythema  Nasal mucosa, septum and turbinates: Normal or edema or discharge         Oropharynx:  Moist mucosa, normal tongue and tonsils without lesions  No erythema   + tenderness on palpation of thyroid     Pulmonary:Respiratory effort normal rate and rhythm, no increased work of breathing  Auscultation of lungs:  Clear bilaterally with no adventitious breath sounds       Cardiovascular: regular rate and rhythm, S1 and S2, no murmur, no edema and/or varicosities of LE      Abdomen: Soft and non-distended     Positive bowel sounds      No heptomegaly or splenomegaly      Gu: no suprapubic tenderness or CVA tenderness, no urethral discharge  Lymphatic:  No anterior or posterior cervical lymphadenopathy         Musculoskeletal:  Gait and station: Normal gait      Digits and nails normal without clubbing or cyanosis       Inspection/palpation of joints, bones, and muscles:  No joint tenderness, swelling, full active and passive range of motion       Skin: Normal skin turgor and no rashes      Neuro:     Normal reflexes     Psych:   alert and oriented to person, place and time     normal mood and affect       Assessment/Plan:Diagnoses and all orders for this visit:    Acute thyroiditis  Comments:  Await ultrasound results and will plan next stage of treatment accordingly  Reviewed with patient plan to treat with above plan      Patient instructed to call in 72 hours if not feeling better or if symptoms worsen

## 2019-03-12 DIAGNOSIS — E06.5 THYROIDITIS, CHRONIC: Primary | ICD-10-CM

## 2019-04-15 ENCOUNTER — TRANSCRIBE ORDERS (OUTPATIENT)
Dept: ADMINISTRATIVE | Facility: HOSPITAL | Age: 59
End: 2019-04-15

## 2019-04-15 DIAGNOSIS — M81.8 IDIOPATHIC OSTEOPOROSIS: ICD-10-CM

## 2019-04-15 DIAGNOSIS — E34.9 ENDOCRINE DISORDER: ICD-10-CM

## 2019-04-15 DIAGNOSIS — E03.9 HYPOTHYROIDISM, UNSPECIFIED TYPE: Primary | ICD-10-CM

## 2019-04-15 DIAGNOSIS — E06.3 CHRONIC LYMPHOCYTIC THYROIDITIS: ICD-10-CM

## 2019-04-15 DIAGNOSIS — E55.9 VITAMIN D DEFICIENCY: ICD-10-CM

## 2019-04-16 ENCOUNTER — APPOINTMENT (OUTPATIENT)
Dept: LAB | Facility: MEDICAL CENTER | Age: 59
End: 2019-04-16
Payer: COMMERCIAL

## 2019-04-16 DIAGNOSIS — E06.3 CHRONIC LYMPHOCYTIC THYROIDITIS: ICD-10-CM

## 2019-04-16 DIAGNOSIS — E34.9 ENDOCRINE DISORDER: ICD-10-CM

## 2019-04-16 DIAGNOSIS — E03.9 HYPOTHYROIDISM, UNSPECIFIED TYPE: ICD-10-CM

## 2019-04-16 DIAGNOSIS — M81.8 IDIOPATHIC OSTEOPOROSIS: ICD-10-CM

## 2019-04-16 DIAGNOSIS — E55.9 VITAMIN D DEFICIENCY: ICD-10-CM

## 2019-04-16 LAB
25(OH)D3 SERPL-MCNC: 62.4 NG/ML (ref 30–100)
ALBUMIN SERPL BCP-MCNC: 3.7 G/DL (ref 3.5–5)
ALP SERPL-CCNC: 69 U/L (ref 46–116)
ALT SERPL W P-5'-P-CCNC: 26 U/L (ref 12–78)
ANION GAP SERPL CALCULATED.3IONS-SCNC: 2 MMOL/L (ref 4–13)
AST SERPL W P-5'-P-CCNC: 18 U/L (ref 5–45)
BACTERIA UR QL AUTO: ABNORMAL /HPF
BASOPHILS # BLD AUTO: 0.04 THOUSANDS/ΜL (ref 0–0.1)
BASOPHILS NFR BLD AUTO: 1 % (ref 0–1)
BILIRUB SERPL-MCNC: 0.62 MG/DL (ref 0.2–1)
BILIRUB UR QL STRIP: NEGATIVE
BUN SERPL-MCNC: 20 MG/DL (ref 5–25)
CALCIUM SERPL-MCNC: 8.6 MG/DL (ref 8.3–10.1)
CHLORIDE SERPL-SCNC: 106 MMOL/L (ref 100–108)
CLARITY UR: CLEAR
CO2 SERPL-SCNC: 29 MMOL/L (ref 21–32)
COLOR UR: YELLOW
CORTIS AM PEAK SERPL-MCNC: 14.1 UG/DL (ref 4.2–22.4)
CREAT SERPL-MCNC: 0.76 MG/DL (ref 0.6–1.3)
CREAT UR-MCNC: 106 MG/DL
EOSINOPHIL # BLD AUTO: 0.11 THOUSAND/ΜL (ref 0–0.61)
EOSINOPHIL NFR BLD AUTO: 2 % (ref 0–6)
ERYTHROCYTE [DISTWIDTH] IN BLOOD BY AUTOMATED COUNT: 13.7 % (ref 11.6–15.1)
ERYTHROCYTE [SEDIMENTATION RATE] IN BLOOD: 14 MM/HOUR (ref 0–20)
EST. AVERAGE GLUCOSE BLD GHB EST-MCNC: 111 MG/DL
GFR SERPL CREATININE-BSD FRML MDRD: 87 ML/MIN/1.73SQ M
GLUCOSE P FAST SERPL-MCNC: 84 MG/DL (ref 65–99)
GLUCOSE UR STRIP-MCNC: NEGATIVE MG/DL
HBA1C MFR BLD: 5.5 % (ref 4.2–6.3)
HCT VFR BLD AUTO: 40 % (ref 34.8–46.1)
HGB BLD-MCNC: 12.6 G/DL (ref 11.5–15.4)
HGB UR QL STRIP.AUTO: NEGATIVE
HYALINE CASTS #/AREA URNS LPF: ABNORMAL /LPF
IMM GRANULOCYTES # BLD AUTO: 0 THOUSAND/UL (ref 0–0.2)
IMM GRANULOCYTES NFR BLD AUTO: 0 % (ref 0–2)
KETONES UR STRIP-MCNC: NEGATIVE MG/DL
LEUKOCYTE ESTERASE UR QL STRIP: ABNORMAL
LYMPHOCYTES # BLD AUTO: 1.21 THOUSANDS/ΜL (ref 0.6–4.47)
LYMPHOCYTES NFR BLD AUTO: 26 % (ref 14–44)
MAGNESIUM SERPL-MCNC: 2.2 MG/DL (ref 1.6–2.6)
MCH RBC QN AUTO: 30.7 PG (ref 26.8–34.3)
MCHC RBC AUTO-ENTMCNC: 31.5 G/DL (ref 31.4–37.4)
MCV RBC AUTO: 98 FL (ref 82–98)
MICROALBUMIN UR-MCNC: 6.5 MG/L (ref 0–20)
MICROALBUMIN/CREAT 24H UR: 6 MG/G CREATININE (ref 0–30)
MONOCYTES # BLD AUTO: 0.34 THOUSAND/ΜL (ref 0.17–1.22)
MONOCYTES NFR BLD AUTO: 7 % (ref 4–12)
NEUTROPHILS # BLD AUTO: 2.96 THOUSANDS/ΜL (ref 1.85–7.62)
NEUTS SEG NFR BLD AUTO: 64 % (ref 43–75)
NITRITE UR QL STRIP: NEGATIVE
NON-SQ EPI CELLS URNS QL MICRO: ABNORMAL /HPF
NRBC BLD AUTO-RTO: 0 /100 WBCS
PH UR STRIP.AUTO: 6 [PH]
PHOSPHATE SERPL-MCNC: 2.9 MG/DL (ref 2.7–4.5)
PLATELET # BLD AUTO: 207 THOUSANDS/UL (ref 149–390)
PMV BLD AUTO: 10.2 FL (ref 8.9–12.7)
POTASSIUM SERPL-SCNC: 4.2 MMOL/L (ref 3.5–5.3)
PROT SERPL-MCNC: 7.1 G/DL (ref 6.4–8.2)
PROT UR STRIP-MCNC: NEGATIVE MG/DL
RBC # BLD AUTO: 4.1 MILLION/UL (ref 3.81–5.12)
RBC #/AREA URNS AUTO: ABNORMAL /HPF
SODIUM SERPL-SCNC: 137 MMOL/L (ref 136–145)
SP GR UR STRIP.AUTO: 1.02 (ref 1–1.03)
T4 FREE SERPL-MCNC: 0.88 NG/DL (ref 0.76–1.46)
TSH SERPL DL<=0.05 MIU/L-ACNC: 1.92 UIU/ML (ref 0.36–3.74)
UROBILINOGEN UR QL STRIP.AUTO: 0.2 E.U./DL
WBC # BLD AUTO: 4.66 THOUSAND/UL (ref 4.31–10.16)
WBC #/AREA URNS AUTO: ABNORMAL /HPF

## 2019-04-16 PROCEDURE — 84439 ASSAY OF FREE THYROXINE: CPT | Performed by: SPECIALIST

## 2019-04-16 PROCEDURE — 82306 VITAMIN D 25 HYDROXY: CPT

## 2019-04-16 PROCEDURE — 84165 PROTEIN E-PHORESIS SERUM: CPT | Performed by: PATHOLOGY

## 2019-04-16 PROCEDURE — 83735 ASSAY OF MAGNESIUM: CPT | Performed by: SPECIALIST

## 2019-04-16 PROCEDURE — 83516 IMMUNOASSAY NONANTIBODY: CPT

## 2019-04-16 PROCEDURE — 81001 URINALYSIS AUTO W/SCOPE: CPT

## 2019-04-16 PROCEDURE — 80053 COMPREHEN METABOLIC PANEL: CPT | Performed by: SPECIALIST

## 2019-04-16 PROCEDURE — 83883 ASSAY NEPHELOMETRY NOT SPEC: CPT

## 2019-04-16 PROCEDURE — 82570 ASSAY OF URINE CREATININE: CPT | Performed by: SPECIALIST

## 2019-04-16 PROCEDURE — 82024 ASSAY OF ACTH: CPT

## 2019-04-16 PROCEDURE — 82043 UR ALBUMIN QUANTITATIVE: CPT | Performed by: SPECIALIST

## 2019-04-16 PROCEDURE — 83835 ASSAY OF METANEPHRINES: CPT

## 2019-04-16 PROCEDURE — 84100 ASSAY OF PHOSPHORUS: CPT | Performed by: SPECIALIST

## 2019-04-16 PROCEDURE — 84443 ASSAY THYROID STIM HORMONE: CPT | Performed by: SPECIALIST

## 2019-04-16 PROCEDURE — 86255 FLUORESCENT ANTIBODY SCREEN: CPT

## 2019-04-16 PROCEDURE — 82533 TOTAL CORTISOL: CPT

## 2019-04-16 PROCEDURE — 36415 COLL VENOUS BLD VENIPUNCTURE: CPT | Performed by: SPECIALIST

## 2019-04-16 PROCEDURE — 82232 ASSAY OF BETA-2 PROTEIN: CPT

## 2019-04-16 PROCEDURE — 85025 COMPLETE CBC W/AUTO DIFF WBC: CPT | Performed by: SPECIALIST

## 2019-04-16 PROCEDURE — 83036 HEMOGLOBIN GLYCOSYLATED A1C: CPT | Performed by: SPECIALIST

## 2019-04-16 PROCEDURE — 85652 RBC SED RATE AUTOMATED: CPT

## 2019-04-16 PROCEDURE — 84165 PROTEIN E-PHORESIS SERUM: CPT

## 2019-04-16 PROCEDURE — 82784 ASSAY IGA/IGD/IGG/IGM EACH: CPT

## 2019-04-16 PROCEDURE — 86800 THYROGLOBULIN ANTIBODY: CPT

## 2019-04-16 PROCEDURE — 84432 ASSAY OF THYROGLOBULIN: CPT

## 2019-04-17 DIAGNOSIS — E03.9 HYPOTHYROIDISM, UNSPECIFIED TYPE: ICD-10-CM

## 2019-04-17 LAB
ACTH PLAS-MCNC: 13.7 PG/ML (ref 7.2–63.3)
ENDOMYSIUM IGA SER QL: NEGATIVE
GLIADIN PEPTIDE IGA SER-ACNC: 6 UNITS (ref 0–19)
GLIADIN PEPTIDE IGG SER-ACNC: 7 UNITS (ref 0–19)
IGA SERPL-MCNC: 151 MG/DL (ref 87–352)
KAPPA LC FREE SER-MCNC: 19 MG/L (ref 3.3–19.4)
KAPPA LC FREE/LAMBDA FREE SER: 1.5 {RATIO} (ref 0.26–1.65)
LAMBDA LC FREE SERPL-MCNC: 12.7 MG/L (ref 5.7–26.3)
TTG IGA SER-ACNC: <2 U/ML (ref 0–3)
TTG IGG SER-ACNC: <2 U/ML (ref 0–5)

## 2019-04-17 RX ORDER — LEVOTHYROXINE SODIUM 0.12 MG/1
TABLET ORAL
Qty: 90 TABLET | Refills: 0 | Status: SHIPPED | OUTPATIENT
Start: 2019-04-17 | End: 2019-09-08 | Stop reason: SDUPTHER

## 2019-04-18 LAB
ALBUMIN SERPL ELPH-MCNC: 4.2 G/DL (ref 3.5–5)
ALBUMIN SERPL ELPH-MCNC: 63.6 % (ref 52–65)
ALPHA1 GLOB SERPL ELPH-MCNC: 0.27 G/DL (ref 0.1–0.4)
ALPHA1 GLOB SERPL ELPH-MCNC: 4.1 % (ref 2.5–5)
ALPHA2 GLOB SERPL ELPH-MCNC: 0.64 G/DL (ref 0.4–1.2)
ALPHA2 GLOB SERPL ELPH-MCNC: 9.7 % (ref 7–13)
B2 MICROGLOB SERPL-MCNC: 1.5 MG/L (ref 0.6–2.4)
BETA GLOB ABNORMAL SERPL ELPH-MCNC: 0.42 G/DL (ref 0.4–0.8)
BETA1 GLOB SERPL ELPH-MCNC: 6.3 % (ref 5–13)
BETA2 GLOB SERPL ELPH-MCNC: 4.7 % (ref 2–8)
BETA2+GAMMA GLOB SERPL ELPH-MCNC: 0.31 G/DL (ref 0.2–0.5)
GAMMA GLOB ABNORMAL SERPL ELPH-MCNC: 0.77 G/DL (ref 0.5–1.6)
GAMMA GLOB SERPL ELPH-MCNC: 11.6 % (ref 12–22)
IGG/ALB SER: 1.75 {RATIO} (ref 1.1–1.8)
METANEPH FREE SERPL-MCNC: 39 PG/ML (ref 0–62)
NORMETANEPHRINE SERPL-MCNC: 62 PG/ML (ref 0–145)
PROT PATTERN SERPL ELPH-IMP: ABNORMAL
PROT SERPL-MCNC: 6.6 G/DL (ref 6.4–8.2)

## 2019-04-23 LAB
THYROGLOB AB SERPL-ACNC: 91.8 IU/ML (ref 0–0.9)
THYROGLOB SERPL-MCNC: 4.3 NG/ML

## 2019-05-08 ENCOUNTER — APPOINTMENT (OUTPATIENT)
Dept: RADIOLOGY | Facility: MEDICAL CENTER | Age: 59
End: 2019-05-08
Payer: COMMERCIAL

## 2019-05-08 ENCOUNTER — OFFICE VISIT (OUTPATIENT)
Dept: FAMILY MEDICINE CLINIC | Facility: CLINIC | Age: 59
End: 2019-05-08
Payer: COMMERCIAL

## 2019-05-08 VITALS
HEIGHT: 64 IN | TEMPERATURE: 98.8 F | HEART RATE: 78 BPM | SYSTOLIC BLOOD PRESSURE: 122 MMHG | WEIGHT: 147 LBS | BODY MASS INDEX: 25.1 KG/M2 | DIASTOLIC BLOOD PRESSURE: 84 MMHG

## 2019-05-08 DIAGNOSIS — M79.661 PAIN OF RIGHT LOWER LEG: ICD-10-CM

## 2019-05-08 DIAGNOSIS — M25.561 ACUTE PAIN OF RIGHT KNEE: Primary | ICD-10-CM

## 2019-05-08 PROCEDURE — 99213 OFFICE O/P EST LOW 20 MIN: CPT | Performed by: FAMILY MEDICINE

## 2019-05-08 PROCEDURE — 73590 X-RAY EXAM OF LOWER LEG: CPT

## 2019-05-21 ENCOUNTER — TELEPHONE (OUTPATIENT)
Dept: FAMILY MEDICINE CLINIC | Facility: CLINIC | Age: 59
End: 2019-05-21

## 2019-05-22 DIAGNOSIS — M25.561 RIGHT KNEE PAIN, UNSPECIFIED CHRONICITY: Primary | ICD-10-CM

## 2019-05-23 ENCOUNTER — OFFICE VISIT (OUTPATIENT)
Dept: OBGYN CLINIC | Facility: MEDICAL CENTER | Age: 59
End: 2019-05-23
Payer: COMMERCIAL

## 2019-05-23 VITALS
HEART RATE: 68 BPM | HEIGHT: 64 IN | WEIGHT: 147.4 LBS | DIASTOLIC BLOOD PRESSURE: 86 MMHG | SYSTOLIC BLOOD PRESSURE: 145 MMHG | BODY MASS INDEX: 25.16 KG/M2

## 2019-05-23 DIAGNOSIS — M25.561 RIGHT KNEE PAIN, UNSPECIFIED CHRONICITY: Primary | ICD-10-CM

## 2019-05-23 DIAGNOSIS — M25.461 EFFUSION OF RIGHT KNEE: ICD-10-CM

## 2019-05-23 PROCEDURE — 99204 OFFICE O/P NEW MOD 45 MIN: CPT | Performed by: FAMILY MEDICINE

## 2019-06-05 ENCOUNTER — OFFICE VISIT (OUTPATIENT)
Dept: FAMILY MEDICINE CLINIC | Facility: CLINIC | Age: 59
End: 2019-06-05
Payer: COMMERCIAL

## 2019-06-05 VITALS
DIASTOLIC BLOOD PRESSURE: 82 MMHG | SYSTOLIC BLOOD PRESSURE: 126 MMHG | HEART RATE: 67 BPM | WEIGHT: 148 LBS | BODY MASS INDEX: 25.27 KG/M2 | HEIGHT: 64 IN | TEMPERATURE: 98.1 F | OXYGEN SATURATION: 98 %

## 2019-06-05 DIAGNOSIS — D18.01 HEMANGIOMA OF SKIN: Primary | ICD-10-CM

## 2019-06-05 PROCEDURE — 99212 OFFICE O/P EST SF 10 MIN: CPT | Performed by: FAMILY MEDICINE

## 2019-06-10 ENCOUNTER — HOSPITAL ENCOUNTER (OUTPATIENT)
Dept: MRI IMAGING | Facility: HOSPITAL | Age: 59
Discharge: HOME | End: 2019-06-10
Attending: FAMILY MEDICINE
Payer: COMMERCIAL

## 2019-06-10 ENCOUNTER — TELEPHONE (OUTPATIENT)
Dept: OBGYN CLINIC | Facility: HOSPITAL | Age: 59
End: 2019-06-10

## 2019-06-10 DIAGNOSIS — M25.561 RIGHT KNEE PAIN, UNSPECIFIED CHRONICITY: ICD-10-CM

## 2019-06-10 PROCEDURE — 73721 MRI JNT OF LWR EXTRE W/O DYE: CPT

## 2019-06-11 DIAGNOSIS — F32.A DEPRESSION, UNSPECIFIED DEPRESSION TYPE: ICD-10-CM

## 2019-06-11 RX ORDER — SERTRALINE HYDROCHLORIDE 100 MG/1
TABLET, FILM COATED ORAL
Qty: 90 TABLET | Refills: 0 | Status: SHIPPED | OUTPATIENT
Start: 2019-06-11 | End: 2019-11-19 | Stop reason: SDUPTHER

## 2019-06-13 ENCOUNTER — OFFICE VISIT (OUTPATIENT)
Dept: OBGYN CLINIC | Facility: MEDICAL CENTER | Age: 59
End: 2019-06-13
Payer: COMMERCIAL

## 2019-06-13 VITALS
HEIGHT: 64 IN | WEIGHT: 147.6 LBS | BODY MASS INDEX: 25.2 KG/M2 | HEART RATE: 70 BPM | DIASTOLIC BLOOD PRESSURE: 73 MMHG | SYSTOLIC BLOOD PRESSURE: 122 MMHG

## 2019-06-13 DIAGNOSIS — M25.461 EFFUSION OF RIGHT KNEE: ICD-10-CM

## 2019-06-13 DIAGNOSIS — M25.561 RIGHT KNEE PAIN, UNSPECIFIED CHRONICITY: Primary | ICD-10-CM

## 2019-06-13 PROCEDURE — 99214 OFFICE O/P EST MOD 30 MIN: CPT | Performed by: FAMILY MEDICINE

## 2019-06-21 ENCOUNTER — OFFICE VISIT (OUTPATIENT)
Dept: OBGYN CLINIC | Facility: MEDICAL CENTER | Age: 59
End: 2019-06-21
Payer: COMMERCIAL

## 2019-06-21 VITALS
DIASTOLIC BLOOD PRESSURE: 78 MMHG | WEIGHT: 147 LBS | BODY MASS INDEX: 25.1 KG/M2 | HEART RATE: 90 BPM | HEIGHT: 64 IN | SYSTOLIC BLOOD PRESSURE: 143 MMHG

## 2019-06-21 DIAGNOSIS — M84.361A STRESS FRACTURE OF RIGHT TIBIA, INITIAL ENCOUNTER: ICD-10-CM

## 2019-06-21 DIAGNOSIS — S82.831A CLOSED FRACTURE OF PROXIMAL END OF RIGHT FIBULA, UNSPECIFIED FRACTURE MORPHOLOGY, INITIAL ENCOUNTER: Primary | ICD-10-CM

## 2019-06-21 PROCEDURE — 99213 OFFICE O/P EST LOW 20 MIN: CPT | Performed by: ORTHOPAEDIC SURGERY

## 2019-09-08 DIAGNOSIS — E03.9 HYPOTHYROIDISM, UNSPECIFIED TYPE: ICD-10-CM

## 2019-09-09 RX ORDER — LEVOTHYROXINE SODIUM 0.12 MG/1
TABLET ORAL
Qty: 90 TABLET | Refills: 0 | Status: SHIPPED | OUTPATIENT
Start: 2019-09-09 | End: 2019-12-25 | Stop reason: SDUPTHER

## 2019-10-01 ENCOUNTER — VBI (OUTPATIENT)
Dept: ADMINISTRATIVE | Facility: OTHER | Age: 59
End: 2019-10-01

## 2019-10-01 NOTE — TELEPHONE ENCOUNTER
All efforts were made to contact the Patient/Employee  All points of contact were tried without success of connecting to the Patient/Employee or leaving a voicemail  Due to the Patient/Employee being unreachable, as a final attempt, a letter will be mailed   No number to call patient so sent letter   Jerzy Webster MA

## 2019-10-01 NOTE — LETTER
10/01/19    Betzaida Aguilera  [phone]       Dear Everardo Wei is committed to providing our employees and their families with education to help maintain a healthy lifestyle  Our records indicate that you have never had or are overdue for a colorectal cancer screening  By completing a colorectal cancer screening regularly, colorectal cancer can be detected and treated early  We have made several attempts to contact you as a part of the [address] [place], [place] [email or web address]  Please contact us directly to discuss colorectal cancer screening options  Upon review we can update your chart, contact your Primary Care for a Colonoscopy referral, or as an alternative screening mail you a FIT kit  The FIT kit can be completed in the comfort of your own home  The packaged mail will include a lab slip, instructions, and all necessary supplies to complete the FIT screening  Upon completion, the results will be sent directly to your Primary Care Physician for review  John Lawson,  is encouraging you to participate in this screening  Early colon cancer detection can be lifesaving  Please contact us at your earliest convenience      Sincerely,        Demar Cleveland, [address] [facility]  [phone]

## 2019-10-10 ENCOUNTER — OFFICE VISIT (OUTPATIENT)
Dept: FAMILY MEDICINE CLINIC | Facility: CLINIC | Age: 59
End: 2019-10-10
Payer: COMMERCIAL

## 2019-10-10 VITALS
WEIGHT: 142 LBS | DIASTOLIC BLOOD PRESSURE: 81 MMHG | HEIGHT: 64 IN | TEMPERATURE: 98.6 F | HEART RATE: 72 BPM | SYSTOLIC BLOOD PRESSURE: 113 MMHG | BODY MASS INDEX: 24.24 KG/M2

## 2019-10-10 DIAGNOSIS — K21.9 GASTROESOPHAGEAL REFLUX DISEASE WITHOUT ESOPHAGITIS: ICD-10-CM

## 2019-10-10 DIAGNOSIS — R00.2 PALPITATIONS: Primary | ICD-10-CM

## 2019-10-10 PROCEDURE — 3008F BODY MASS INDEX DOCD: CPT | Performed by: FAMILY MEDICINE

## 2019-10-10 PROCEDURE — 99213 OFFICE O/P EST LOW 20 MIN: CPT | Performed by: FAMILY MEDICINE

## 2019-10-11 ENCOUNTER — TRANSCRIBE ORDERS (OUTPATIENT)
Dept: ADMINISTRATIVE | Facility: HOSPITAL | Age: 59
End: 2019-10-11

## 2019-10-11 ENCOUNTER — APPOINTMENT (OUTPATIENT)
Dept: LAB | Facility: MEDICAL CENTER | Age: 59
End: 2019-10-11
Payer: COMMERCIAL

## 2019-10-11 DIAGNOSIS — E06.3 CHRONIC LYMPHOCYTIC THYROIDITIS: ICD-10-CM

## 2019-10-11 DIAGNOSIS — E03.9 HYPOTHYROIDISM, UNSPECIFIED TYPE: Primary | ICD-10-CM

## 2019-10-11 DIAGNOSIS — E55.9 VITAMIN D DEFICIENCY, UNSPECIFIED: ICD-10-CM

## 2019-10-11 DIAGNOSIS — E34.9 ENDOCRINE DISORDER: ICD-10-CM

## 2019-10-11 DIAGNOSIS — E03.9 HYPOTHYROIDISM, UNSPECIFIED TYPE: ICD-10-CM

## 2019-10-11 LAB
ALBUMIN SERPL BCP-MCNC: 3.9 G/DL (ref 3.5–5)
ALP SERPL-CCNC: 82 U/L (ref 46–116)
ALT SERPL W P-5'-P-CCNC: 20 U/L (ref 12–78)
ANION GAP SERPL CALCULATED.3IONS-SCNC: 7 MMOL/L (ref 4–13)
AST SERPL W P-5'-P-CCNC: 16 U/L (ref 5–45)
BACTERIA UR QL AUTO: ABNORMAL /HPF
BASOPHILS # BLD AUTO: 0.04 THOUSANDS/ΜL (ref 0–0.1)
BASOPHILS NFR BLD AUTO: 1 % (ref 0–1)
BILIRUB SERPL-MCNC: 0.37 MG/DL (ref 0.2–1)
BILIRUB UR QL STRIP: NEGATIVE
BUN SERPL-MCNC: 14 MG/DL (ref 5–25)
CALCIUM SERPL-MCNC: 9.1 MG/DL (ref 8.3–10.1)
CHLORIDE SERPL-SCNC: 103 MMOL/L (ref 100–108)
CLARITY UR: CLEAR
CO2 SERPL-SCNC: 29 MMOL/L (ref 21–32)
COLOR UR: YELLOW
CREAT SERPL-MCNC: 0.62 MG/DL (ref 0.6–1.3)
CREAT UR-MCNC: 150 MG/DL
EOSINOPHIL # BLD AUTO: 0.08 THOUSAND/ΜL (ref 0–0.61)
EOSINOPHIL NFR BLD AUTO: 1 % (ref 0–6)
ERYTHROCYTE [DISTWIDTH] IN BLOOD BY AUTOMATED COUNT: 13.4 % (ref 11.6–15.1)
EST. AVERAGE GLUCOSE BLD GHB EST-MCNC: 97 MG/DL
GFR SERPL CREATININE-BSD FRML MDRD: 99 ML/MIN/1.73SQ M
GLUCOSE SERPL-MCNC: 79 MG/DL (ref 65–140)
GLUCOSE UR STRIP-MCNC: NEGATIVE MG/DL
HBA1C MFR BLD: 5 % (ref 4.2–6.3)
HCT VFR BLD AUTO: 37.9 % (ref 34.8–46.1)
HGB BLD-MCNC: 11.6 G/DL (ref 11.5–15.4)
HGB UR QL STRIP.AUTO: NEGATIVE
HYALINE CASTS #/AREA URNS LPF: ABNORMAL /LPF
IMM GRANULOCYTES # BLD AUTO: 0.02 THOUSAND/UL (ref 0–0.2)
IMM GRANULOCYTES NFR BLD AUTO: 0 % (ref 0–2)
KETONES UR STRIP-MCNC: NEGATIVE MG/DL
LEUKOCYTE ESTERASE UR QL STRIP: ABNORMAL
LYMPHOCYTES # BLD AUTO: 1.51 THOUSANDS/ΜL (ref 0.6–4.47)
LYMPHOCYTES NFR BLD AUTO: 21 % (ref 14–44)
MAGNESIUM SERPL-MCNC: 2.3 MG/DL (ref 1.6–2.6)
MCH RBC QN AUTO: 29.7 PG (ref 26.8–34.3)
MCHC RBC AUTO-ENTMCNC: 30.6 G/DL (ref 31.4–37.4)
MCV RBC AUTO: 97 FL (ref 82–98)
MICROALBUMIN UR-MCNC: 14.7 MG/L (ref 0–20)
MICROALBUMIN/CREAT 24H UR: 10 MG/G CREATININE (ref 0–30)
MONOCYTES # BLD AUTO: 0.5 THOUSAND/ΜL (ref 0.17–1.22)
MONOCYTES NFR BLD AUTO: 7 % (ref 4–12)
NEUTROPHILS # BLD AUTO: 5.05 THOUSANDS/ΜL (ref 1.85–7.62)
NEUTS SEG NFR BLD AUTO: 70 % (ref 43–75)
NITRITE UR QL STRIP: NEGATIVE
NON-SQ EPI CELLS URNS QL MICRO: ABNORMAL /HPF
NRBC BLD AUTO-RTO: 0 /100 WBCS
PH UR STRIP.AUTO: 5.5 [PH]
PHOSPHATE SERPL-MCNC: 3.6 MG/DL (ref 2.7–4.5)
PLATELET # BLD AUTO: 256 THOUSANDS/UL (ref 149–390)
PMV BLD AUTO: 9.8 FL (ref 8.9–12.7)
POTASSIUM SERPL-SCNC: 3.9 MMOL/L (ref 3.5–5.3)
PROT SERPL-MCNC: 7.4 G/DL (ref 6.4–8.2)
PROT UR STRIP-MCNC: NEGATIVE MG/DL
RBC # BLD AUTO: 3.9 MILLION/UL (ref 3.81–5.12)
RBC #/AREA URNS AUTO: ABNORMAL /HPF
SODIUM SERPL-SCNC: 139 MMOL/L (ref 136–145)
SP GR UR STRIP.AUTO: 1.02 (ref 1–1.03)
T4 FREE SERPL-MCNC: 1.02 NG/DL (ref 0.76–1.46)
TSH SERPL DL<=0.05 MIU/L-ACNC: 1.78 UIU/ML (ref 0.36–3.74)
UROBILINOGEN UR QL STRIP.AUTO: 0.2 E.U./DL
WBC # BLD AUTO: 7.2 THOUSAND/UL (ref 4.31–10.16)
WBC #/AREA URNS AUTO: ABNORMAL /HPF

## 2019-10-11 PROCEDURE — 84100 ASSAY OF PHOSPHORUS: CPT | Performed by: SPECIALIST

## 2019-10-11 PROCEDURE — 84432 ASSAY OF THYROGLOBULIN: CPT

## 2019-10-11 PROCEDURE — 82043 UR ALBUMIN QUANTITATIVE: CPT | Performed by: SPECIALIST

## 2019-10-11 PROCEDURE — 83036 HEMOGLOBIN GLYCOSYLATED A1C: CPT | Performed by: SPECIALIST

## 2019-10-11 PROCEDURE — 83735 ASSAY OF MAGNESIUM: CPT | Performed by: SPECIALIST

## 2019-10-11 PROCEDURE — 82570 ASSAY OF URINE CREATININE: CPT | Performed by: SPECIALIST

## 2019-10-11 PROCEDURE — 80053 COMPREHEN METABOLIC PANEL: CPT | Performed by: SPECIALIST

## 2019-10-11 PROCEDURE — 84439 ASSAY OF FREE THYROXINE: CPT | Performed by: SPECIALIST

## 2019-10-11 PROCEDURE — 84443 ASSAY THYROID STIM HORMONE: CPT | Performed by: SPECIALIST

## 2019-10-11 PROCEDURE — 81001 URINALYSIS AUTO W/SCOPE: CPT | Performed by: SPECIALIST

## 2019-10-11 PROCEDURE — 85025 COMPLETE CBC W/AUTO DIFF WBC: CPT | Performed by: SPECIALIST

## 2019-10-11 PROCEDURE — 36415 COLL VENOUS BLD VENIPUNCTURE: CPT | Performed by: SPECIALIST

## 2019-10-11 PROCEDURE — 86800 THYROGLOBULIN ANTIBODY: CPT

## 2019-10-11 NOTE — PROGRESS NOTES
Patient ID: Anna Au is a 61 y o  female  HPI: 61 y  o female presenting with an episode of palpitaitons which occurred yesterday after eating something for lunch which  Was spicier than expected  She had acid reflux and had some palpitations at that time, but both went away when seh took an otc antacid  She denies any chest pain, dypsnea, cough or other symtpoms  SUBJECTIVE    Family History   Problem Relation Age of Onset    Heart disease Mother         cardiac    Colon cancer Father     Heart disease Father      Social History     Socioeconomic History    Marital status: /Civil Union     Spouse name: Not on file    Number of children: Not on file    Years of education: Not on file    Highest education level: Not on file   Occupational History    Not on file   Social Needs    Financial resource strain: Not on file    Food insecurity:     Worry: Not on file     Inability: Not on file    Transportation needs:     Medical: Not on file     Non-medical: Not on file   Tobacco Use    Smoking status: Never Smoker    Smokeless tobacco: Never Used   Substance and Sexual Activity    Alcohol use:  Yes    Drug use: Never    Sexual activity: Not on file   Lifestyle    Physical activity:     Days per week: Not on file     Minutes per session: Not on file    Stress: Not on file   Relationships    Social connections:     Talks on phone: Not on file     Gets together: Not on file     Attends Oriental orthodox service: Not on file     Active member of club or organization: Not on file     Attends meetings of clubs or organizations: Not on file     Relationship status: Not on file    Intimate partner violence:     Fear of current or ex partner: Not on file     Emotionally abused: Not on file     Physically abused: Not on file     Forced sexual activity: Not on file   Other Topics Concern    Not on file   Social History Narrative    Daily coffee    Denied cola    Denied tea     Past Medical History: Diagnosis Date    Cataract, right     Detached retina, right     Disease of thyroid gland     OCD (obsessive compulsive disorder)     Seasonal allergies     Wrist fracture      Past Surgical History:   Procedure Laterality Date    CATARACT EXTRACTION      EYE SURGERY      FOOT SURGERY      MANDIBLE FRACTURE SURGERY      TONSILLECTOMY       No Known Allergies    Current Outpatient Medications:     levothyroxine 125 mcg tablet, TAKE ONE TABLET BY MOUTH EVERY DAY, Disp: 90 tablet, Rfl: 0    sertraline (ZOLOFT) 100 mg tablet, TAKE ONE TABLET BY MOUTH EVERY DAY, Disp: 90 tablet, Rfl: 0    Elastic Bandages & Supports (KNEE BRACE/HINGED/REGULAR) MISC, by Does not apply route daily, Disp: 1 each, Rfl: 0    Review of Systems  Constitutional:     Denies fever, chills ,fatigue ,weakness ,weight loss, weight gain     ENT: Denies earache ,loss of hearing ,nosebleed, nasal discharge,nasal congestion ,sore throat ,hoarseness  Pulmonary: Denies shortness of breath ,cough  ,dyspnea on exertion, orthopnea  ,PND   Cardiovascular:  Denies bradycardia , tachycardia   lower extremity edema leg, claudication + one isolated incidence of palpitations  Breast:  Denies new or changing breast lumps ,nipple discharge ,nipple changes  Abdomen:  Denies abdominal pain , anorexia , nausea, vomiting, constipation, diarrhea  Musculoskeletal: Denies myalgias, arthralgias, joint swelling, joint stiffness , limb pain, limb swelling + isolated incidence of acid reflux  Gu: denies dysuria, polyuria  Skin: Denies skin rash, skin lesion, skin wound, itching, dry skin  Neuro: Denies headache, numbness, tingling, confusion, loss of consciousness, dizziness, vertigo  Psychiatric: Denies feelings of depression, suicidal ideation, anxiety, sleep disturbances    OBJECTIVE  /81   Pulse 72   Temp 98 6 °F (37 °C)   Ht 5' 3 5" (1 613 m)   Wt 64 4 kg (142 lb)   BMI 24 76 kg/m²   Constitutional:   NAD, well appearing and well nourished ENT:   Conjunctiva and lids: no injection, edema, or discharge     Pupils and iris: LEXIE bilaterally    External inspection of ears and nose: normal without deformities or discharge  Otoscopic exam: Canals patent without erythema  Nasal mucosa, septum and turbinates: Normal or edema or discharge         Oropharynx:  Moist mucosa, normal tongue and tonsils without lesions  No erythema        Pulmonary:Respiratory effort normal rate and rhythm, no increased work of breathing  Auscultation of lungs:  Clear bilaterally with no adventitious breath sounds       Cardiovascular: regular rate and rhythm, S1 and S2, no murmur, no edema and/or varicosities of LE      Abdomen: Soft and non-distended     Positive bowel sounds      No heptomegaly or splenomegaly      Gu: no suprapubic tenderness or CVA tenderness, no urethral discharge  Lymphatic:  No anterior or posterior cervical lymphadenopathy         Musculoskeletal:  Gait and station: Normal gait      Digits and nails normal without clubbing or cyanosis       Inspection/palpation of joints, bones, and muscles:  No joint tenderness, swelling, full active and passive range of motion       Skin: Normal skin turgor and no rashes      Neuro:      Normal reflexes      Psych:   alert and oriented to person, place and time     normal mood and affect       Assessment/Plan:Diagnoses and all orders for this visit:    Palpitations  Comments: If symptoms reoccur, pt to call  Gastroesophageal reflux disease without esophagitis  Comments:  Pt counselled regarding GERD  If she experiences symptoms twice a week or more, she is to call  Reviewed with patient plan to treat with above plan     Patient instructed to call in 72 hours if not feeling better or if symptoms worsen

## 2019-10-15 ENCOUNTER — TELEPHONE (OUTPATIENT)
Dept: FAMILY MEDICINE CLINIC | Facility: CLINIC | Age: 59
End: 2019-10-15

## 2019-10-15 DIAGNOSIS — Z12.39 SCREENING FOR BREAST CANCER: Primary | ICD-10-CM

## 2019-10-22 LAB
THYROGLOB AB SERPL-ACNC: 44.5 IU/ML (ref 0–0.9)
THYROGLOB SERPL-MCNC: 4.5 NG/ML

## 2019-11-19 DIAGNOSIS — F32.A DEPRESSION, UNSPECIFIED DEPRESSION TYPE: ICD-10-CM

## 2019-11-19 RX ORDER — SERTRALINE HYDROCHLORIDE 100 MG/1
TABLET, FILM COATED ORAL
Qty: 90 TABLET | Refills: 0 | Status: SHIPPED | OUTPATIENT
Start: 2019-11-19 | End: 2020-04-13

## 2019-11-29 ENCOUNTER — OFFICE VISIT (OUTPATIENT)
Dept: FAMILY MEDICINE CLINIC | Facility: CLINIC | Age: 59
End: 2019-11-29
Payer: COMMERCIAL

## 2019-11-29 VITALS
WEIGHT: 147 LBS | DIASTOLIC BLOOD PRESSURE: 70 MMHG | TEMPERATURE: 99.1 F | HEART RATE: 72 BPM | BODY MASS INDEX: 25.1 KG/M2 | HEIGHT: 64 IN | SYSTOLIC BLOOD PRESSURE: 112 MMHG

## 2019-11-29 DIAGNOSIS — J06.9 UPPER RESPIRATORY TRACT INFECTION, UNSPECIFIED TYPE: Primary | ICD-10-CM

## 2019-11-29 PROCEDURE — 96372 THER/PROPH/DIAG INJ SC/IM: CPT | Performed by: FAMILY MEDICINE

## 2019-11-29 PROCEDURE — 3008F BODY MASS INDEX DOCD: CPT | Performed by: FAMILY MEDICINE

## 2019-11-29 PROCEDURE — 99213 OFFICE O/P EST LOW 20 MIN: CPT | Performed by: FAMILY MEDICINE

## 2019-11-29 RX ORDER — PREDNISONE 10 MG/1
TABLET ORAL
Qty: 26 TABLET | Refills: 0 | Status: SHIPPED | OUTPATIENT
Start: 2019-11-29 | End: 2020-03-03

## 2019-11-29 RX ORDER — GUAIFENESIN AND CODEINE PHOSPHATE 100; 10 MG/5ML; MG/5ML
5 SOLUTION ORAL 3 TIMES DAILY PRN
Qty: 120 ML | Refills: 0 | Status: SHIPPED | OUTPATIENT
Start: 2019-11-29 | End: 2020-03-03

## 2019-11-29 RX ORDER — AZITHROMYCIN 250 MG/1
TABLET, FILM COATED ORAL
Qty: 6 TABLET | Refills: 0 | Status: SHIPPED | OUTPATIENT
Start: 2019-11-29 | End: 2019-12-03

## 2019-11-29 RX ORDER — CEFTRIAXONE 1 G/1
1000 INJECTION, POWDER, FOR SOLUTION INTRAMUSCULAR; INTRAVENOUS ONCE
Status: COMPLETED | OUTPATIENT
Start: 2019-11-29 | End: 2019-11-29

## 2019-11-29 RX ADMIN — CEFTRIAXONE 1000 MG: 1 INJECTION, POWDER, FOR SOLUTION INTRAMUSCULAR; INTRAVENOUS at 13:19

## 2019-11-29 NOTE — PROGRESS NOTES
BMI Counseling: Body mass index is 25 63 kg/m²  The BMI is above normal  Nutrition recommendations include reducing portion sizes  Patient ID: Neeta Mahajan is a 61 y o  female  HPI: 61 y  o female presenting with 3 day history of sore throat, nasal congestion, ear pain,pnd and cough  Pt denies any fever, chills, or body aches  SUBJECTIVE    Family History   Problem Relation Age of Onset    Heart disease Mother         cardiac    Colon cancer Father     Heart disease Father      Social History     Socioeconomic History    Marital status: /Civil Union     Spouse name: Not on file    Number of children: Not on file    Years of education: Not on file    Highest education level: Not on file   Occupational History    Not on file   Social Needs    Financial resource strain: Not on file    Food insecurity:     Worry: Not on file     Inability: Not on file    Transportation needs:     Medical: Not on file     Non-medical: Not on file   Tobacco Use    Smoking status: Never Smoker    Smokeless tobacco: Never Used   Substance and Sexual Activity    Alcohol use:  Yes    Drug use: Never    Sexual activity: Not on file   Lifestyle    Physical activity:     Days per week: Not on file     Minutes per session: Not on file    Stress: Not on file   Relationships    Social connections:     Talks on phone: Not on file     Gets together: Not on file     Attends Quaker service: Not on file     Active member of club or organization: Not on file     Attends meetings of clubs or organizations: Not on file     Relationship status: Not on file    Intimate partner violence:     Fear of current or ex partner: Not on file     Emotionally abused: Not on file     Physically abused: Not on file     Forced sexual activity: Not on file   Other Topics Concern    Not on file   Social History Narrative    Daily coffee    Denied cola    Denied tea     Past Medical History:   Diagnosis Date    Cataract, right     Detached retina, right     Disease of thyroid gland     OCD (obsessive compulsive disorder)     Seasonal allergies     Wrist fracture      Past Surgical History:   Procedure Laterality Date    CATARACT EXTRACTION      EYE SURGERY      FOOT SURGERY      MANDIBLE FRACTURE SURGERY      TONSILLECTOMY       No Known Allergies    Current Outpatient Medications:     levothyroxine 125 mcg tablet, TAKE ONE TABLET BY MOUTH EVERY DAY, Disp: 90 tablet, Rfl: 0    sertraline (ZOLOFT) 100 mg tablet, TAKE ONE TABLET BY MOUTH EVERY DAY, Disp: 90 tablet, Rfl: 0    azithromycin (ZITHROMAX) 250 mg tablet, Take 2 tablets today then 1 tablet daily x 4 days, Disp: 6 tablet, Rfl: 0    guaifenesin-codeine (GUAIFENESIN AC) 100-10 MG/5ML liquid, Take 5 mL by mouth 3 (three) times a day as needed for cough, Disp: 120 mL, Rfl: 0    predniSONE 10 mg tablet, 3 tabs po bid x2 days, then 2 tabs po bid x2 days, then 1 tab bid x2 days, then 1 daily until done , Disp: 26 tablet, Rfl: 0  No current facility-administered medications for this visit  Review of Systems  Constitutional:     Denies fever, chills ,fatigue ,weakness ,weight loss, weight gain     ENT: Denies loss of hearing ,nosebleed, nasal discharge ,hoarseness; but admits to nasal congestion , sore throat and ear pain      Pulmonary: Denies shortness of breath ,dyspnea on exertion, orthopnea ; but admits to cough and pnd  Cardiovascular:  Denies bradycardia , tachycardia  ,palpations, lower extremity edema leg, claudication  Breast:  Denies new or changing breast lumps ,nipple discharge ,nipple changes  Abdomen:  Denies abdominal pain , anorexia , indigestion, nausea, vomiting, constipation, diarrhea  Musculoskeletal: Denies myalgias, arthralgias, joint swelling, joint stiffness , limb pain, limb swelling  Lymph: + swollen glands  Gu: Denies polyuria or dysuria  Skin: Denies skin rash, skin lesion, skin wound, itching, dry skin  Neuro: Denies headache, numbness, tingling, confusion, loss of consciousness, dizziness, vertigo  Psychiatric: Denies feelings of depression, suicidal ideation, anxiety, sleep disturbances    OBJECTIVE  /70   Pulse 72   Temp 99 1 °F (37 3 °C)   Ht 5' 3 5" (1 613 m)   Wt 66 7 kg (147 lb)   BMI 25 63 kg/m²   Constitutional:   NAD, well appearing and well nourished     ENT:   Conjunctiva and lids: no injection, edema, or discharge    Pupils and iris: LEXIE bilaterally  External inspection of ears and nose: normal without deformities or discharge  Otoscopic exam: Canals patent without erythema, tm dull and erythematous, effusions bilaterally   Nasal mucosa, septum and turbinates: + turbinate injection, nasal discharge         Oropharynx:  Moist mucosa, normal tongue and tonsils without lesions  + erythema and injection of posterior pharynx with pnd    Pulmonary:Respiratory effort normal rate and rhythm, no increased work of breathing  Auscultation of lungs:  Clear bilaterally with no adventitious breath sounds     Cardiovascular: regular rate and rhythm, S1 and S2, no murmur, no edema and/or varicosities of LE     Abdomen: Soft and nontender with + bowel sounds  No heptomegaly or splenomegaly     Gu: no suprapubic tenderness or CVA tenderness  Lymphatic: + anterior  cervical lymphadenopathy      Musculoskeletal:  Gait and station: Normal gait      Digits and nails normal without clubbing or cyanosis      Inspection/palpation of joints, bones, and muscles:  No joint tenderness, swelling, full active and passive range of motion       Skin: Normal skin turgor and no rashes      Neuro:    Normal reflexes  Pych:   alert and oriented to person, place and time     normal mood and affect      Assessment/Plan:Diagnoses and all orders for this visit:    Upper respiratory tract infection, unspecified type  -     predniSONE 10 mg tablet; 3 tabs po bid x2 days, then 2 tabs po bid x2 days, then 1 tab bid x2 days, then 1 daily until done    -     azithromycin (ZITHROMAX) 250 mg tablet; Take 2 tablets today then 1 tablet daily x 4 days  -     guaifenesin-codeine (GUAIFENESIN AC) 100-10 MG/5ML liquid; Take 5 mL by mouth 3 (three) times a day as needed for cough  -     cefTRIAXone (ROCEPHIN) injection 1,000 mg        Reviewed with patient plan to treat with above plan      Patient instructed to call in 72 hours if not feeling better or if symptoms worsen

## 2019-12-05 ENCOUNTER — HOSPITAL ENCOUNTER (OUTPATIENT)
Dept: RADIOLOGY | Facility: MEDICAL CENTER | Age: 59
Discharge: HOME | End: 2019-12-05
Payer: COMMERCIAL

## 2019-12-05 VITALS — BODY MASS INDEX: 25.1 KG/M2 | HEIGHT: 64 IN | WEIGHT: 147 LBS

## 2019-12-05 DIAGNOSIS — Z12.39 SCREENING FOR BREAST CANCER: ICD-10-CM

## 2019-12-05 DIAGNOSIS — Z12.31 VISIT FOR SCREENING MAMMOGRAM: ICD-10-CM

## 2019-12-05 PROCEDURE — 77067 SCR MAMMO BI INCL CAD: CPT

## 2019-12-05 PROCEDURE — 77063 BREAST TOMOSYNTHESIS BI: CPT

## 2019-12-25 DIAGNOSIS — E03.9 HYPOTHYROIDISM, UNSPECIFIED TYPE: ICD-10-CM

## 2019-12-26 RX ORDER — LEVOTHYROXINE SODIUM 0.12 MG/1
TABLET ORAL
Qty: 90 TABLET | Refills: 0 | Status: SHIPPED | OUTPATIENT
Start: 2019-12-26 | End: 2020-06-17

## 2020-01-15 ENCOUNTER — TELEPHONE (OUTPATIENT)
Dept: FAMILY MEDICINE CLINIC | Facility: CLINIC | Age: 60
End: 2020-01-15

## 2020-02-03 ENCOUNTER — TELEPHONE (OUTPATIENT)
Dept: FAMILY MEDICINE CLINIC | Facility: CLINIC | Age: 60
End: 2020-02-03

## 2020-02-03 DIAGNOSIS — J06.9 UPPER RESPIRATORY TRACT INFECTION, UNSPECIFIED TYPE: Primary | ICD-10-CM

## 2020-02-03 RX ORDER — AZITHROMYCIN 250 MG/1
TABLET, FILM COATED ORAL
Qty: 6 TABLET | Refills: 0 | Status: SHIPPED | OUTPATIENT
Start: 2020-02-03 | End: 2020-02-07

## 2020-02-03 RX ORDER — BROMPHENIRAMINE MALEATE, PSEUDOEPHEDRINE HYDROCHLORIDE, AND DEXTROMETHORPHAN HYDROBROMIDE 2; 30; 10 MG/5ML; MG/5ML; MG/5ML
10 SYRUP ORAL 4 TIMES DAILY PRN
Qty: 180 ML | Refills: 0 | Status: SHIPPED | OUTPATIENT
Start: 2020-02-03 | End: 2020-03-03

## 2020-02-03 NOTE — TELEPHONE ENCOUNTER
Patient called stating she has a sore throat, cough and is bring up yellow mucus  Can something be called in?      [facility]  Aware to check with pharmacy

## 2020-02-06 DIAGNOSIS — J06.9 UPPER RESPIRATORY TRACT INFECTION, UNSPECIFIED TYPE: Primary | ICD-10-CM

## 2020-02-06 RX ORDER — DEXTROMETHORPHAN HYDROBROMIDE AND PROMETHAZINE HYDROCHLORIDE 15; 6.25 MG/5ML; MG/5ML
5 SOLUTION ORAL 4 TIMES DAILY PRN
Qty: 180 ML | Refills: 1 | Status: SHIPPED | OUTPATIENT
Start: 2020-02-06 | End: 2020-03-03

## 2020-03-03 ENCOUNTER — ANNUAL EXAM (OUTPATIENT)
Dept: FAMILY MEDICINE CLINIC | Facility: CLINIC | Age: 60
End: 2020-03-03
Payer: COMMERCIAL

## 2020-03-03 VITALS
HEIGHT: 64 IN | TEMPERATURE: 98.7 F | SYSTOLIC BLOOD PRESSURE: 120 MMHG | HEART RATE: 74 BPM | BODY MASS INDEX: 23.9 KG/M2 | DIASTOLIC BLOOD PRESSURE: 76 MMHG | WEIGHT: 140 LBS

## 2020-03-03 DIAGNOSIS — Z12.4 SCREENING FOR CERVICAL CANCER: ICD-10-CM

## 2020-03-03 DIAGNOSIS — B00.9 HSV INFECTION: ICD-10-CM

## 2020-03-03 DIAGNOSIS — Z01.419 ROUTINE GYNECOLOGICAL EXAMINATION: Primary | ICD-10-CM

## 2020-03-03 PROCEDURE — 3008F BODY MASS INDEX DOCD: CPT | Performed by: FAMILY MEDICINE

## 2020-03-03 PROCEDURE — 1036F TOBACCO NON-USER: CPT | Performed by: FAMILY MEDICINE

## 2020-03-03 PROCEDURE — 99213 OFFICE O/P EST LOW 20 MIN: CPT | Performed by: FAMILY MEDICINE

## 2020-03-03 PROCEDURE — G0143 SCR C/V CYTO,THINLAYER,RESCR: HCPCS | Performed by: FAMILY MEDICINE

## 2020-03-03 RX ORDER — VALACYCLOVIR HYDROCHLORIDE 1 G/1
1000 TABLET, FILM COATED ORAL 3 TIMES DAILY
Qty: 90 TABLET | Refills: 2 | Status: SHIPPED | OUTPATIENT
Start: 2020-03-03 | End: 2021-01-19 | Stop reason: ALTCHOICE

## 2020-03-04 NOTE — PROGRESS NOTES
Patient ID: Kevin Wrihgt is a 61 y o  female  HPI: 61 y  o female presents for a gyn exam   She needs a refill of valtrex for oral hsv ulcers, but otherwise denies any complaints at this time  She denies any breakthrough bleeding, gi or gu symptoms  She is up to date with her mammogram and colon screening  SUBJECTIVE    Family History   Problem Relation Age of Onset    Heart disease Mother         cardiac    Colon cancer Father 72    Heart disease Father     No Known Problems Sister     No Known Problems Daughter     No Known Problems Maternal Grandmother     No Known Problems Maternal Grandfather     No Known Problems Paternal Grandmother     Prostate cancer Paternal Grandfather     No Known Problems Sister     No Known Problems Daughter     No Known Problems Maternal Aunt     No Known Problems Paternal Aunt     No Known Problems Paternal Aunt      Social History     Socioeconomic History    Marital status: /Civil Union     Spouse name: Not on file    Number of children: Not on file    Years of education: Not on file    Highest education level: Not on file   Occupational History    Not on file   Social Needs    Financial resource strain: Not on file    Food insecurity:     Worry: Not on file     Inability: Not on file    Transportation needs:     Medical: Not on file     Non-medical: Not on file   Tobacco Use    Smoking status: Never Smoker    Smokeless tobacco: Never Used   Substance and Sexual Activity    Alcohol use:  Yes    Drug use: Never    Sexual activity: Not on file   Lifestyle    Physical activity:     Days per week: Not on file     Minutes per session: Not on file    Stress: Not on file   Relationships    Social connections:     Talks on phone: Not on file     Gets together: Not on file     Attends Anabaptist service: Not on file     Active member of club or organization: Not on file     Attends meetings of clubs or organizations: Not on file     Relationship status: Not on file    Intimate partner violence:     Fear of current or ex partner: Not on file     Emotionally abused: Not on file     Physically abused: Not on file     Forced sexual activity: Not on file   Other Topics Concern    Not on file   Social History Narrative    Daily coffee    Denied cola    Denied tea     Past Medical History:   Diagnosis Date    Cataract, right     Detached retina, right     Disease of thyroid gland     OCD (obsessive compulsive disorder)     Seasonal allergies     Wrist fracture      Past Surgical History:   Procedure Laterality Date    CATARACT EXTRACTION      EYE SURGERY      FOOT SURGERY      MANDIBLE FRACTURE SURGERY      TONSILLECTOMY       No Known Allergies    Current Outpatient Medications:     levothyroxine 125 mcg tablet, TAKE ONE TABLET BY MOUTH EVERY DAY, Disp: 90 tablet, Rfl: 0    sertraline (ZOLOFT) 100 mg tablet, TAKE ONE TABLET BY MOUTH EVERY DAY, Disp: 90 tablet, Rfl: 0    valACYclovir (VALTREX) 1,000 mg tablet, Take 1 tablet (1,000 mg total) by mouth 3 (three) times a day, Disp: 90 tablet, Rfl: 2    Review of Systems  Constitutional:     Denies fever, chills, fatigue, weakness, weight loss, weight gain     Pulmonary: Denies shortness of breath,cough, dyspnea on exertion, orthopnea,  PND   Cardiovascular:  Denies bradycardia, tachycardia, palpations, lower extremity edema leg, claudication  Abdomen:  Denies abdominal pain, anorexia, indigestion, nausea, vomiting, constipation, diarrhea  ;Denies dysuria, urinary incontinence or polyuria  GYN: normal menses, no abnormal bleeding, pelvic pain or discharge, no breast pain or new or enlarging lumps on self exam  Skin: Denies skin rash, skin lesion, skin wound, itching, dry skin  Psychiatric: Denies feelings of depression, anxiety, sleep disturbances    OBJECTIVE    Constitutional:   NAD, well appearing and well nourished      Pulmonary:Respiratory effort normal rate and rhythm, no increased work of breathing  Auscultation of lungs:  Clear bilaterally with no adventitious breath sounds       Cardiovascular: regular rate and rhythm, S1 and S2, no murmur, no edema and/or varicosities of LE      Abdomen: Soft and non-distended     Positive bowel sounds      No heptomegaly or splenomegaly      Gu: No suprapubic tenderness, CVA tenderenss or urethral discharge noted  Psych:   alert and oriented to person, place and time     normal mood and affect   Gynecologic:       Breast:            Breasts: breasts appear normal, no suspicious masses, no skin or nipple changes or axillary nodes  Pelvic:             exam chaperoned by nurseURSZULA within normal limits, normal vagina and vulva       Rectal:             negative without mass, lesions or tenderness, external hemorrhoids noted, stool guaiac negative    Assessment/Plan:Diagnoses and all orders for this visit:    HSV infection  -     valACYclovir (VALTREX) 1,000 mg tablet;  Take 1 tablet (1,000 mg total) by mouth 3 (three) times a day    Screening for cervical cancer  -     Liquid-based pap, screening

## 2020-03-06 LAB
LAB AP GYN PRIMARY INTERPRETATION: NORMAL
Lab: NORMAL

## 2020-03-08 ENCOUNTER — OFFICE VISIT (OUTPATIENT)
Dept: URGENT CARE | Facility: MEDICAL CENTER | Age: 60
End: 2020-03-08
Payer: COMMERCIAL

## 2020-03-08 VITALS
RESPIRATION RATE: 18 BRPM | DIASTOLIC BLOOD PRESSURE: 78 MMHG | HEART RATE: 90 BPM | OXYGEN SATURATION: 96 % | TEMPERATURE: 99.5 F | HEIGHT: 64 IN | SYSTOLIC BLOOD PRESSURE: 128 MMHG | WEIGHT: 141 LBS | BODY MASS INDEX: 24.07 KG/M2

## 2020-03-08 DIAGNOSIS — J11.1 INFLUENZA: Primary | ICD-10-CM

## 2020-03-08 PROCEDURE — 87631 RESP VIRUS 3-5 TARGETS: CPT | Performed by: PHYSICIAN ASSISTANT

## 2020-03-08 PROCEDURE — G0382 LEV 3 HOSP TYPE B ED VISIT: HCPCS | Performed by: PHYSICIAN ASSISTANT

## 2020-03-08 RX ORDER — BENZONATATE 200 MG/1
200 CAPSULE ORAL 3 TIMES DAILY PRN
Qty: 20 CAPSULE | Refills: 0 | Status: SHIPPED | OUTPATIENT
Start: 2020-03-08 | End: 2020-08-18 | Stop reason: ALTCHOICE

## 2020-03-08 NOTE — LETTER
March 8, 2020     Patient: Miguel Villalobos   YOB: 1960   Date of Visit: 3/8/2020       To Whom It May Concern: It is my medical opinion that Miguel Villalobos may return to work on 3/12/20  If you have any questions or concerns, please don't hesitate to call           Sincerely,        Alicia Ratliff PA-C    CC: Miguel Villalobos

## 2020-03-08 NOTE — PROGRESS NOTES
[facility]        NAME: Theodore Alatorre is a 61 y o  female  : 1960    MRN: [id number]  DATE: 2020  TIME: 8:30 AM    Assessment and Plan   Influenza [J11 1]  1  Influenza  Influenza A/B and RSV by PCR    benzonatate (TESSALON) 200 MG capsule         Patient Instructions     1  Motrin as needed for fever/body aches  2  Increase fluids  3  Take Tessalon 200mg  Every 8 hours as needed for cough  4  Follow up with PCP in 3-5 days if symptoms persist       Chief Complaint     Chief Complaint   Patient presents with    Cold Like Symptoms     Pt  with cough, chills, aches for the past three days  No fever at home  History of Present Illness       Juanita Thompson is a 70-year-old female presents with a 3 day history of acute onset cough, nasal congestion, postnasal drip, chills and body aches  Patient denies any fever, vomiting or diarrhea since the onset of her symptoms  Review of Systems   Review of Systems   Constitutional: Positive for chills and fatigue  Negative for fever  HENT: Positive for congestion, postnasal drip and rhinorrhea  Respiratory: Positive for cough  Gastrointestinal: Negative            Current Medications       Current Outpatient Medications:     levothyroxine 125 mcg tablet, TAKE ONE TABLET BY MOUTH EVERY DAY, Disp: 90 tablet, Rfl: 0    sertraline (ZOLOFT) 100 mg tablet, TAKE ONE TABLET BY MOUTH EVERY DAY, Disp: 90 tablet, Rfl: 0    benzonatate (TESSALON) 200 MG capsule, Take 1 capsule (200 mg total) by mouth 3 (three) times a day as needed for cough, Disp: 20 capsule, Rfl: 0    valACYclovir (VALTREX) 1,000 mg tablet, Take 1 tablet (1,000 mg total) by mouth 3 (three) times a day, Disp: 90 tablet, Rfl: 2    Current Allergies     Allergies as of 2020    (No Known Allergies)            The following portions of the patient's history were reviewed and updated as appropriate: allergies, current medications, past family history, past medical history, past

## 2020-03-08 NOTE — PATIENT INSTRUCTIONS
1  Motrin as needed for fever/body aches  2  Increase fluids  3  Take Tessalon 200mg  Every 8 hours as needed for cough  4   Follow up with PCP in 3-5 days if symptoms persist

## 2020-03-09 ENCOUNTER — TELEPHONE (OUTPATIENT)
Dept: FAMILY MEDICINE CLINIC | Facility: CLINIC | Age: 60
End: 2020-03-09

## 2020-03-09 LAB
FLUAV RNA NPH QL NAA+PROBE: NORMAL
FLUBV RNA NPH QL NAA+PROBE: NORMAL
RSV RNA NPH QL NAA+PROBE: NORMAL

## 2020-03-09 NOTE — TELEPHONE ENCOUNTER
Patient called stating [facility] was [facility] at [facility] and was tested for the flu  The facility would not give her the results  Can we call her with the results of the influenza?

## 2020-03-09 NOTE — TELEPHONE ENCOUNTER
Patient states do you want to see her? She only got medication for her cough and she is still very sick  Her symptoms- deep cough, yellow mucus, runny nose, achey       [facility]

## 2020-03-10 ENCOUNTER — OFFICE VISIT (OUTPATIENT)
Dept: FAMILY MEDICINE CLINIC | Facility: CLINIC | Age: 60
End: 2020-03-10
Payer: COMMERCIAL

## 2020-03-10 VITALS
OXYGEN SATURATION: 97 % | BODY MASS INDEX: 24.07 KG/M2 | SYSTOLIC BLOOD PRESSURE: 120 MMHG | DIASTOLIC BLOOD PRESSURE: 82 MMHG | HEART RATE: 82 BPM | HEIGHT: 64 IN | TEMPERATURE: 99.3 F | WEIGHT: 141 LBS

## 2020-03-10 DIAGNOSIS — J20.9 ACUTE BRONCHITIS, UNSPECIFIED ORGANISM: Primary | ICD-10-CM

## 2020-03-10 PROCEDURE — 3008F BODY MASS INDEX DOCD: CPT | Performed by: FAMILY MEDICINE

## 2020-03-10 PROCEDURE — 99213 OFFICE O/P EST LOW 20 MIN: CPT | Performed by: FAMILY MEDICINE

## 2020-03-10 PROCEDURE — 1036F TOBACCO NON-USER: CPT | Performed by: FAMILY MEDICINE

## 2020-03-10 RX ORDER — PREDNISONE 10 MG/1
TABLET ORAL
Qty: 26 TABLET | Refills: 0 | Status: SHIPPED | OUTPATIENT
Start: 2020-03-10 | End: 2020-08-18 | Stop reason: ALTCHOICE

## 2020-03-10 RX ORDER — DOXYCYCLINE HYCLATE 100 MG/1
100 CAPSULE ORAL EVERY 12 HOURS SCHEDULED
Qty: 20 CAPSULE | Refills: 0 | Status: SHIPPED | OUTPATIENT
Start: 2020-03-10 | End: 2020-03-20

## 2020-03-10 NOTE — PROGRESS NOTES
Patient ID: Ayaan Castillo is a 61 y o  female  HPI: 61 y  o female presenting with symptoms of chest congestion, cough and wheezing  SUBJECTIVE    Family History   Problem Relation Age of Onset    Heart disease Mother         cardiac    Colon cancer Father 72    Heart disease Father     No Known Problems Sister     No Known Problems Daughter     No Known Problems Maternal Grandmother     No Known Problems Maternal Grandfather     No Known Problems Paternal Grandmother     Prostate cancer Paternal Grandfather     No Known Problems Sister     No Known Problems Daughter     No Known Problems Maternal Aunt     No Known Problems Paternal Aunt     No Known Problems Paternal Aunt      Social History     Socioeconomic History    Marital status: /Civil Union     Spouse name: Not on file    Number of children: Not on file    Years of education: Not on file    Highest education level: Not on file   Occupational History    Not on file   Social Needs    Financial resource strain: Not on file    Food insecurity:     Worry: Not on file     Inability: Not on file    Transportation needs:     Medical: Not on file     Non-medical: Not on file   Tobacco Use    Smoking status: Never Smoker    Smokeless tobacco: Never Used   Substance and Sexual Activity    Alcohol use:  Yes    Drug use: Never    Sexual activity: Not on file   Lifestyle    Physical activity:     Days per week: Not on file     Minutes per session: Not on file    Stress: Not on file   Relationships    Social connections:     Talks on phone: Not on file     Gets together: Not on file     Attends Restorationism service: Not on file     Active member of club or organization: Not on file     Attends meetings of clubs or organizations: Not on file     Relationship status: Not on file    Intimate partner violence:     Fear of current or ex partner: Not on file     Emotionally abused: Not on file     Physically abused: Not on file     Forced sexual activity: Not on file   Other Topics Concern    Not on file   Social History Narrative    Daily coffee    Denied cola    Denied tea     Past Medical History:   Diagnosis Date    Cataract, right     Detached retina, right     Disease of thyroid gland     OCD (obsessive compulsive disorder)     Seasonal allergies     Wrist fracture      Past Surgical History:   Procedure Laterality Date    CATARACT EXTRACTION      EYE SURGERY      FOOT SURGERY      MANDIBLE FRACTURE SURGERY      TONSILLECTOMY       No Known Allergies    Current Outpatient Medications:     benzonatate (TESSALON) 200 MG capsule, Take 1 capsule (200 mg total) by mouth 3 (three) times a day as needed for cough, Disp: 20 capsule, Rfl: 0    levothyroxine 125 mcg tablet, TAKE ONE TABLET BY MOUTH EVERY DAY, Disp: 90 tablet, Rfl: 0    sertraline (ZOLOFT) 100 mg tablet, TAKE ONE TABLET BY MOUTH EVERY DAY, Disp: 90 tablet, Rfl: 0    valACYclovir (VALTREX) 1,000 mg tablet, Take 1 tablet (1,000 mg total) by mouth 3 (three) times a day, Disp: 90 tablet, Rfl: 2    doxycycline hyclate (VIBRAMYCIN) 100 mg capsule, Take 1 capsule (100 mg total) by mouth every 12 (twelve) hours for 10 days, Disp: 20 capsule, Rfl: 0    predniSONE 10 mg tablet, 3 tabs po bid x2 days, then 2 tabs po bid x2 days, then 1 tab bid x2 days, then 1 daily until done , Disp: 26 tablet, Rfl: 0    Review of Systems    Constitutional:     Denies fever, chills, fatigue, weakness ,weight loss, weight gain     ENT: Denies earache, loss of hearing, nosebleed, nasal discharge,nasal congestion, sore throat,hoarseness  Pulmonary: Denies shortness of breath , dyspnea on exertion, orthopnea ,but complains of cough, wheezing and pnd    Cardiovascular:  Denies bradycardia , tachycardia ,palpations, lower extremity, edema leg, claudication  Breast:  Denies new or changing breast lumps,  nipple discharge, nipple changes,  Abdomen:  Denies abdominal pain , anorexia ,indigestion, nausea ,vomiting, constipation , diarrhea  Musculoskeletal: Denies myalgias, arthralgias, joint swelling, joint stiffness ,limb pain, limb swelling  Lymph: denies swollen glands  Gu: no dysuria or urinary frequency  Skin: Denies skin rash, skin lesion, skin wound, itching,dry skin  Neuro: Denies headache, numbness, tingling, confusion, loss of consciousness, dizziness ,vertigo  Psychiatric: Denies feelings of depression, suicidal ideation, anxiety, sleep disturbances    OBJECTIVE  /82   Pulse 82   Temp 99 3 °F (37 4 °C)   Ht 5' 3 5" (1 613 m)   Wt 64 kg (141 lb)   SpO2 97%   BMI 24 59 kg/m²   Constitutional:   NAD, well appearing and well nourished      ENT:   Conjunctiva and lids: no injection, edema, or discharge     Pupils and iris: LEXIE bilaterally    External inspection of ears and nose: normal without deformities or discharge  Otoscopic exam: Canals patent without erythema; tm are dull and erythematous bilaterally       Nasal mucosa, septum and turbinates: Turbinae injection with discharge   Oropharynx:  Moist mucosa, normal tongue and tonsils without lesions  Erythema and injection  of post pharynx with pnd     Pulmonary:Respiratory effort normal rate and rhythm, no increased work of breathing   Auscultation of lungs:  Scattered rhonchi and expiratory wheezes bilaterally      Cardiovascular: regular rate and rhythm, S1 and S2, no murmur, no edema and/or varicosities of LE      Abdomen: Soft and non-distended    Positive bowel sounds    No heptomegaly or splenomegaly    Gu: no suprapubic tenderness, no CVA tenderness, or urethral discharge  Lymphatic: Anterior cervical lymphadenopathy     Muscskeletal:  Gait and station: Normal gait     Digits and nails normal without clubbing or cyanosis      Inspection/palpation of joints, bones, and muscles:  No joint tenderness, swelling, full active and passive range of motion  Skin: Normal skin turgor and no rashes      Neuro:    Normal reflexes       Psych: alert and oriented to person, place and time     normal mood and affect       Assessment/Plan:Diagnoses and all orders for this visit:    Acute bronchitis, unspecified organism  -     predniSONE 10 mg tablet; 3 tabs po bid x2 days, then 2 tabs po bid x2 days, then 1 tab bid x2 days, then 1 daily until done   -     doxycycline hyclate (VIBRAMYCIN) 100 mg capsule; Take 1 capsule (100 mg total) by mouth every 12 (twelve) hours for 10 days        Reviewed with patient plan to treat with plan as above      Patient instructed to call in 72 hours if not feeling better or if symptoms worsen

## 2020-03-13 ENCOUNTER — TELEPHONE (OUTPATIENT)
Dept: FAMILY MEDICINE CLINIC | Facility: CLINIC | Age: 60
End: 2020-03-13

## 2020-03-13 NOTE — TELEPHONE ENCOUNTER
Patient called asking if she thinks she should go back to work on Monday? She is still coughing really badly and she works at [employer]  She is ready to go back but wanted your opinion because of the coughing

## 2020-03-13 NOTE — TELEPHONE ENCOUNTER
She should wait until the cough is under better control   When it is and she is ready to go back , have her call and I will give her a return to work note

## 2020-03-16 NOTE — TELEPHONE ENCOUNTER
1) She is still on all the meds, antib, prednisone,  She is still not great  Her ears are still full  The cough is better but kicks up a lot,  Pl adv  2) She will need a work note faxed to the number previously given    3/8, return 3/18

## 2020-03-17 ENCOUNTER — TELEPHONE (OUTPATIENT)
Dept: FAMILY MEDICINE CLINIC | Facility: CLINIC | Age: 60
End: 2020-03-17

## 2020-03-17 DIAGNOSIS — J01.00 ACUTE NON-RECURRENT MAXILLARY SINUSITIS: Primary | ICD-10-CM

## 2020-03-17 RX ORDER — AMOXICILLIN AND CLAVULANATE POTASSIUM 875; 125 MG/1; MG/1
1 TABLET, FILM COATED ORAL EVERY 12 HOURS SCHEDULED
Qty: 14 TABLET | Refills: 0 | Status: SHIPPED | OUTPATIENT
Start: 2020-03-17 | End: 2020-03-24

## 2020-03-17 NOTE — TELEPHONE ENCOUNTER
Patient calling for continuing symptoms  Seen 3/10 by PCP Rx doxy/pred  Bronchitis improved but now sinusitis Sx: Sinus congestion, headache, ear pressure  Denies fever (has thermometer), sore throat, aches, diarrhea  Denies SOB  No COVID-19 exposure  Has been home 10 days  No travel  A/P: Augmentin  Call for fever/SOB or not improving

## 2020-03-26 ENCOUNTER — TELEMEDICINE (OUTPATIENT)
Dept: FAMILY MEDICINE CLINIC | Facility: CLINIC | Age: 60
End: 2020-03-26
Payer: COMMERCIAL

## 2020-03-26 DIAGNOSIS — H69.83 EUSTACHIAN TUBE DYSFUNCTION, BILATERAL: Primary | ICD-10-CM

## 2020-03-26 PROCEDURE — 99442 PR PHYS/QHP TELEPHONE EVALUATION 11-20 MIN: CPT | Performed by: FAMILY MEDICINE

## 2020-03-26 RX ORDER — NEOMYCIN SULFATE, POLYMYXIN B SULFATE AND HYDROCORTISONE 10; 3.5; 1 MG/ML; MG/ML; [USP'U]/ML
4 SUSPENSION/ DROPS AURICULAR (OTIC) EVERY 6 HOURS SCHEDULED
Qty: 10 ML | Refills: 0 | Status: SHIPPED | OUTPATIENT
Start: 2020-03-26 | End: 2020-08-18 | Stop reason: ALTCHOICE

## 2020-03-26 RX ORDER — METHYLPREDNISOLONE 4 MG/1
TABLET ORAL
Qty: 21 EACH | Refills: 0 | Status: SHIPPED | OUTPATIENT
Start: 2020-03-26 | End: 2020-08-18 | Stop reason: ALTCHOICE

## 2020-03-27 NOTE — PROGRESS NOTES
Virtual Regular Visit    Problem List Items Addressed This Visit     None      Visit Diagnoses     Eustachian tube dysfunction, bilateral    -  Primary    Relevant Medications    methylPREDNISolone 4 MG tablet therapy pack    neomycin-polymyxin-hydrocortisone (CORTISPORIN) 0 35%-10,000 units/mL-1% otic suspension               Reason for visit is pressure and crackling in her ears; right>left   Encounter provider Jing Rocha DO    Provider located at [address]      Recent Visits  No visits were found meeting these conditions  Showing recent visits within past 7 days and meeting all other requirements     Future Appointments  No visits were found meeting these conditions  Showing future appointments within next 150 days and meeting all other requirements        After connecting through EPS, the patient was identified by name and date of birth  Betzaida Aguilera was informed that this is a telemedicine visit and that the visit is being conducted through  and patient was informed that this is not a secure, HIPAA-complaint platform  she agrees to proceed  which may not be secure and therefore, might not be HIPAA-compliant  My office door was closed  No one else was in the room  She acknowledged consent and understanding of privacy and security of the video platform  The patient has agreed to participate and understands they can discontinue the visit at any time  Subjective  Betzaida Aguilera is a 61 y o  female presenting with cracking of her ears and intermittent dizziness with positional changes  She denies nasal congestion, fever, cough or discolored nasal discharge  She also denies any ear pain  She has no international travel, [address] travel to areas with widespread COVID-9 transmission or local travel to local areas with ongoing COVID-9 transmission within the last 14 days   Patient denies having contact with any individuals that are currently being tested or tested positive for COVID-9  Patient instructed to call if no improvement in 72 hours or symptoms worsen       Past Medical History:   Diagnosis Date    Cataract, right     Detached retina, right     Disease of thyroid gland     OCD (obsessive compulsive disorder)     Seasonal allergies     Wrist fracture        Past Surgical History:   Procedure Laterality Date    CATARACT EXTRACTION      EYE SURGERY      FOOT SURGERY      MANDIBLE FRACTURE SURGERY      TONSILLECTOMY         Current Outpatient Medications   Medication Sig Dispense Refill    benzonatate (TESSALON) 200 MG capsule Take 1 capsule (200 mg total) by mouth 3 (three) times a day as needed for cough 20 capsule 0    levothyroxine 125 mcg tablet TAKE ONE TABLET BY MOUTH EVERY DAY 90 tablet 0    methylPREDNISolone 4 MG tablet therapy pack Use as directed on package 21 each 0    neomycin-polymyxin-hydrocortisone (CORTISPORIN) 0 35%-10,000 units/mL-1% otic suspension Administer 4 drops into both ears every 6 (six) hours 10 mL 0    predniSONE 10 mg tablet 3 tabs po bid x2 days, then 2 tabs po bid x2 days, then 1 tab bid x2 days, then 1 daily until done  26 tablet 0    sertraline (ZOLOFT) 100 mg tablet TAKE ONE TABLET BY MOUTH EVERY DAY 90 tablet 0    valACYclovir (VALTREX) 1,000 mg tablet Take 1 tablet (1,000 mg total) by mouth 3 (three) times a day 90 tablet 2     No current facility-administered medications for this visit  No Known Allergies    Review of Systems   HENT:        Crackling of ears; right >left; + ear pressure   Neurological: Positive for light-headedness  + dizziness with positional changes   All other systems reviewed and are negative  Physical Exam     I spent 15 minutes with the patient during this visit  If I would have seen this patient in the office I would have charged a [id number]

## 2020-04-08 ENCOUNTER — TELEPHONE (OUTPATIENT)
Dept: FAMILY MEDICINE CLINIC | Facility: CLINIC | Age: 60
End: 2020-04-08

## 2020-04-13 DIAGNOSIS — F32.A DEPRESSION, UNSPECIFIED DEPRESSION TYPE: ICD-10-CM

## 2020-04-13 RX ORDER — SERTRALINE HYDROCHLORIDE 100 MG/1
TABLET, FILM COATED ORAL
Qty: 90 TABLET | Refills: 2 | Status: SHIPPED | OUTPATIENT
Start: 2020-04-13 | End: 2020-04-13 | Stop reason: SDUPTHER

## 2020-04-13 RX ORDER — SERTRALINE HYDROCHLORIDE 100 MG/1
100 TABLET, FILM COATED ORAL DAILY
Qty: 14 TABLET | Refills: 0 | Status: SHIPPED | OUTPATIENT
Start: 2020-04-13 | End: 2020-08-14 | Stop reason: SDUPTHER

## 2020-06-17 DIAGNOSIS — E03.9 HYPOTHYROIDISM, UNSPECIFIED TYPE: ICD-10-CM

## 2020-06-17 RX ORDER — LEVOTHYROXINE SODIUM 0.12 MG/1
TABLET ORAL
Qty: 90 TABLET | Refills: 1 | Status: SHIPPED | OUTPATIENT
Start: 2020-06-17 | End: 2020-06-18

## 2020-06-18 RX ORDER — LEVOTHYROXINE SODIUM 0.12 MG/1
TABLET ORAL
Qty: 90 TABLET | Refills: 0 | Status: SHIPPED | OUTPATIENT
Start: 2020-06-18 | End: 2021-07-28

## 2020-06-29 ENCOUNTER — APPOINTMENT (OUTPATIENT)
Dept: RADIOLOGY | Facility: MEDICAL CENTER | Age: 60
End: 2020-06-29
Payer: COMMERCIAL

## 2020-06-29 ENCOUNTER — OFFICE VISIT (OUTPATIENT)
Dept: FAMILY MEDICINE CLINIC | Facility: CLINIC | Age: 60
End: 2020-06-29
Payer: COMMERCIAL

## 2020-06-29 VITALS
HEART RATE: 78 BPM | WEIGHT: 141 LBS | TEMPERATURE: 99.8 F | DIASTOLIC BLOOD PRESSURE: 78 MMHG | BODY MASS INDEX: 24.07 KG/M2 | HEIGHT: 64 IN | SYSTOLIC BLOOD PRESSURE: 130 MMHG

## 2020-06-29 DIAGNOSIS — M54.12 CERVICAL RADICULOPATHY: Primary | ICD-10-CM

## 2020-06-29 DIAGNOSIS — M54.12 CERVICAL RADICULOPATHY: ICD-10-CM

## 2020-06-29 PROCEDURE — 1036F TOBACCO NON-USER: CPT | Performed by: FAMILY MEDICINE

## 2020-06-29 PROCEDURE — 99213 OFFICE O/P EST LOW 20 MIN: CPT | Performed by: FAMILY MEDICINE

## 2020-06-29 PROCEDURE — 72050 X-RAY EXAM NECK SPINE 4/5VWS: CPT

## 2020-06-29 RX ORDER — CYCLOBENZAPRINE HCL 10 MG
10 TABLET ORAL
Qty: 30 TABLET | Refills: 0 | Status: SHIPPED | OUTPATIENT
Start: 2020-06-29 | End: 2020-08-18 | Stop reason: ALTCHOICE

## 2020-06-29 RX ORDER — MELOXICAM 15 MG/1
15 TABLET ORAL DAILY
Qty: 30 TABLET | Refills: 5 | Status: SHIPPED | OUTPATIENT
Start: 2020-06-29 | End: 2020-10-05

## 2020-06-29 RX ORDER — HYDROCODONE BITARTRATE AND ACETAMINOPHEN 5; 325 MG/1; MG/1
1 TABLET ORAL EVERY 6 HOURS PRN
Qty: 40 TABLET | Refills: 0 | Status: SHIPPED | OUTPATIENT
Start: 2020-06-29 | End: 2020-08-18 | Stop reason: ALTCHOICE

## 2020-07-01 NOTE — PROGRESS NOTES
Patient ID: Joi Craig is a 61 y o  female  HPI: 61 y  o female presenting with chief complaint of cervical pain radiaitng down his right arm  She denies any weakness or paresthesia  She also denies any injury  SUBJECTIVE    Family History   Problem Relation Age of Onset    Heart disease Mother         cardiac    Colon cancer Father 72    Heart disease Father     No Known Problems Sister     No Known Problems Daughter     No Known Problems Maternal Grandmother     No Known Problems Maternal Grandfather     No Known Problems Paternal Grandmother     Prostate cancer Paternal Grandfather     No Known Problems Sister     No Known Problems Daughter     No Known Problems Maternal Aunt     No Known Problems Paternal Aunt     No Known Problems Paternal Aunt      Social History     Socioeconomic History    Marital status: /Civil Union     Spouse name: Not on file    Number of children: Not on file    Years of education: Not on file    Highest education level: Not on file   Occupational History    Not on file   Social Needs    Financial resource strain: Not on file    Food insecurity:     Worry: Not on file     Inability: Not on file    Transportation needs:     Medical: Not on file     Non-medical: Not on file   Tobacco Use    Smoking status: Never Smoker    Smokeless tobacco: Never Used   Substance and Sexual Activity    Alcohol use:  Yes    Drug use: Never    Sexual activity: Not on file   Lifestyle    Physical activity:     Days per week: Not on file     Minutes per session: Not on file    Stress: Not on file   Relationships    Social connections:     Talks on phone: Not on file     Gets together: Not on file     Attends [employer] service: Not on file     Active member of club or organization: Not on file     Attends meetings of clubs or organizations: Not on file     Relationship status: Not on file    Intimate partner violence:     Fear of current or ex partner: Not on file     Emotionally abused: Not on file     Physically abused: Not on file     Forced sexual activity: Not on file   Other Topics Concern    Not on file   Social History Narrative    Daily coffee    Denied cola    Denied tea     Past Medical History:   Diagnosis Date    Cataract, right     Detached retina, right     Disease of thyroid gland     OCD (obsessive compulsive disorder)     Seasonal allergies     Wrist fracture      Past Surgical History:   Procedure Laterality Date    CATARACT EXTRACTION      EYE SURGERY      FOOT SURGERY      MANDIBLE FRACTURE SURGERY      TONSILLECTOMY       No Known Allergies    Current Outpatient Medications:     benzonatate (TESSALON) 200 MG capsule, Take 1 capsule (200 mg total) by mouth 3 (three) times a day as needed for cough, Disp: 20 capsule, Rfl: 0    cyclobenzaprine (FLEXERIL) 10 mg tablet, Take 1 tablet (10 mg total) by mouth daily at bedtime, Disp: 30 tablet, Rfl: 0    HYDROcodone-acetaminophen (NORCO) 5-325 mg per tablet, Take 1 tablet by mouth every 6 (six) hours as needed for painMax Daily Amount: 4 tablets, Disp: 40 tablet, Rfl: 0    levothyroxine 125 mcg tablet, TAKE ONE TABLET BY MOUTH EVERY DAY`, Disp: 90 tablet, Rfl: 0    meloxicam (MOBIC) 15 mg tablet, Take 1 tablet (15 mg total) by mouth daily, Disp: 30 tablet, Rfl: 5    methylPREDNISolone 4 MG tablet therapy pack, Use as directed on package, Disp: 21 each, Rfl: 0    neomycin-polymyxin-hydrocortisone (CORTISPORIN) 0 35%-10,000 units/mL-1% otic suspension, Administer 4 drops into both ears every 6 (six) hours, Disp: 10 mL, Rfl: 0    predniSONE 10 mg tablet, 3 tabs po bid x2 days, then 2 tabs po bid x2 days, then 1 tab bid x2 days, then 1 daily until done , Disp: 26 tablet, Rfl: 0    sertraline (ZOLOFT) 100 mg tablet, Take 1 tablet (100 mg total) by mouth daily, Disp: 14 tablet, Rfl: 0    valACYclovir (VALTREX) 1,000 mg tablet, Take 1 tablet (1,000 mg total) by mouth 3 (three) times a day, Disp: 90 tablet, Rfl: 2    Review of Systems  Constitutional:     Denies fever, chills ,fatigue ,weakness ,weight loss, weight gain     ENT: Denies earache ,loss of hearing ,nosebleed, nasal discharge,nasal congestion ,sore throat ,hoarseness  Pulmonary: Denies shortness of breath ,cough  ,dyspnea on exertion, orthopnea  ,PND   Cardiovascular:  Denies bradycardia , tachycardia  ,palpations, lower extremity edema leg, claudication  Breast:  Denies new or changing breast lumps ,nipple discharge ,nipple changes  Abdomen:  Denies abdominal pain , anorexia , indigestion, nausea, vomiting, constipation, diarrhea  Musculoskeletal: Denies myalgias, joint swelling, joint stiffness , limb pain, limb swelling+ cervical pain radiating down right arm  Gu: denies dysuria, polyuria  Skin: Denies skin rash, skin lesion, skin wound, itching, dry skin  Neuro: Denies headache, numbness, tingling, confusion, loss of consciousness, dizziness, vertigo  Psychiatric: Denies feelings of depression, suicidal ideation, anxiety, sleep disturbances    OBJECTIVE  /78   Pulse 78   Temp 99 8 °F (37 7 °C)   Ht 5' 3 5" (1 613 m)   Wt 64 kg (141 lb)   BMI 24 59 kg/m²   Constitutional:   NAD, well appearing and well nourished      ENT:   Conjunctiva and lids: no injection, edema, or discharge     Pupils and iris: LEXIE bilaterally    External inspection of ears and nose: normal without deformities or discharge  Otoscopic exam: Canals patent without erythema  Nasal mucosa, septum and turbinates: Normal or edema or discharge         Oropharynx:  Moist mucosa, normal tongue and tonsils without lesions  No erythema        Pulmonary:Respiratory effort normal rate and rhythm, no increased work of breathing   Auscultation of lungs:  Clear bilaterally with no adventitious breath sounds       Cardiovascular: regular rate and rhythm, S1 and S2, no murmur, no edema and/or varicosities of LE      Abdomen: Soft and non-distended     Positive bowel sounds      No heptomegaly or splenomegaly      Gu: no suprapubic tenderness or CVA tenderness, no urethral discharge  Lymphatic:  No anterior or posterior cervical lymphadenopathy         Musculoskeletal:  Gait and station: Normal gait      Digits and nails normal without clubbing or cyanosis       Inspection/palpation of joints, bones, and muscles:  Increased cervical pillar tenderenss and muscle tension with tenderness on palation of rihg tcervical pillar and full rom of cspine without pain  Skin: Normal skin turgor and no rashes      Neuro:    Normal  CN 2-12     Normal reflexes     Normal sensation    Psych:   alert and oriented to person, place and time     normal mood and affect       Assessment/Plan:Diagnoses and all orders for this visit:    Cervical radiculopathy  -     XR spine cervical complete 4 or 5 vw non injury; Future  -     meloxicam (MOBIC) 15 mg tablet; Take 1 tablet (15 mg total) by mouth daily  -     cyclobenzaprine (FLEXERIL) 10 mg tablet; Take 1 tablet (10 mg total) by mouth daily at bedtime  -     HYDROcodone-acetaminophen (NORCO) 5-325 mg per tablet; Take 1 tablet by mouth every 6 (six) hours as needed for painMax Daily Amount: 4 tablets        Reviewed with patient plan to treat with above plan      Patient instructed to call in 72 hours if not feeling better or if symptoms worsen

## 2020-08-14 DIAGNOSIS — F32.A DEPRESSION, UNSPECIFIED DEPRESSION TYPE: ICD-10-CM

## 2020-08-14 RX ORDER — SERTRALINE HYDROCHLORIDE 100 MG/1
100 TABLET, FILM COATED ORAL DAILY
Qty: 90 TABLET | Refills: 1 | Status: SHIPPED | OUTPATIENT
Start: 2020-08-14 | End: 2021-07-28

## 2020-08-18 ENCOUNTER — OFFICE VISIT (OUTPATIENT)
Dept: FAMILY MEDICINE CLINIC | Facility: CLINIC | Age: 60
End: 2020-08-18
Payer: COMMERCIAL

## 2020-08-18 VITALS
TEMPERATURE: 99.7 F | DIASTOLIC BLOOD PRESSURE: 78 MMHG | BODY MASS INDEX: 24.59 KG/M2 | HEIGHT: 64 IN | SYSTOLIC BLOOD PRESSURE: 118 MMHG | HEART RATE: 74 BPM | WEIGHT: 144 LBS

## 2020-08-18 DIAGNOSIS — M54.12 CERVICAL RADICULOPATHY: Primary | ICD-10-CM

## 2020-08-18 PROCEDURE — 1036F TOBACCO NON-USER: CPT | Performed by: FAMILY MEDICINE

## 2020-08-18 PROCEDURE — 99213 OFFICE O/P EST LOW 20 MIN: CPT | Performed by: FAMILY MEDICINE

## 2020-08-18 PROCEDURE — 3008F BODY MASS INDEX DOCD: CPT | Performed by: FAMILY MEDICINE

## 2020-08-19 NOTE — PROGRESS NOTES
Patient ID: Esteban Eid is a 61 y o  female  HPI: 61 y  o female presenting with ongoing cervical pain with radiation down left arm, presthesia of left arm and some weakness of left arm compared to right  She has tried oral steroid courses, some online physical therapy exercises during covid, NSAIDS, muscle relaxants and rest without benefit  SUBJECTIVE    Family History   Problem Relation Age of Onset    Heart disease Mother         cardiac    Colon cancer Father 72    Heart disease Father     No Known Problems Sister     No Known Problems Daughter     No Known Problems Maternal Grandmother     No Known Problems Maternal Grandfather     No Known Problems Paternal Grandmother     Prostate cancer Paternal Grandfather     No Known Problems Sister     No Known Problems Daughter     No Known Problems Maternal Aunt     No Known Problems Paternal Aunt     No Known Problems Paternal Aunt      Social History     Socioeconomic History    Marital status: /Civil Union     Spouse name: Not on file    Number of children: Not on file    Years of education: Not on file    Highest education level: Not on file   Occupational History    Not on file   Social Needs    Financial resource strain: Not on file    Food insecurity     Worry: Not on file     Inability: Not on file   [employer] needs     Medical: Not on file     Non-medical: Not on file   Tobacco Use    Smoking status: Never Smoker    Smokeless tobacco: Never Used   Substance and Sexual Activity    Alcohol use:  Yes    Drug use: Never    Sexual activity: Not on file   Lifestyle    Physical activity     Days per week: Not on file     Minutes per session: Not on file    Stress: Not on file   Relationships    Social connections     Talks on phone: Not on file     Gets together: Not on file     Attends Hoahaoism service: Not on file     Active member of club or organization: Not on file     Attends meetings of clubs or organizations: Not on file     Relationship status: Not on file    Intimate partner violence     Fear of current or ex partner: Not on file     Emotionally abused: Not on file     Physically abused: Not on file     Forced sexual activity: Not on file   Other Topics Concern    Not on file   Social History Narrative    Daily coffee    Denied cola    Denied tea     Past Medical History:   Diagnosis Date    Cataract, right     Detached retina, right     Disease of thyroid gland     OCD (obsessive compulsive disorder)     Seasonal allergies     Wrist fracture      Past Surgical History:   Procedure Laterality Date    CATARACT EXTRACTION      EYE SURGERY      FOOT SURGERY      MANDIBLE FRACTURE SURGERY      TONSILLECTOMY       No Known Allergies    Current Outpatient Medications:     levothyroxine 125 mcg tablet, TAKE ONE TABLET BY MOUTH EVERY DAY`, Disp: 90 tablet, Rfl: 0    meloxicam (MOBIC) 15 mg tablet, Take 1 tablet (15 mg total) by mouth daily, Disp: 30 tablet, Rfl: 5    sertraline (ZOLOFT) 100 mg tablet, Take 1 tablet (100 mg total) by mouth daily, Disp: 90 tablet, Rfl: 1    valACYclovir (VALTREX) 1,000 mg tablet, Take 1 tablet (1,000 mg total) by mouth 3 (three) times a day, Disp: 90 tablet, Rfl: 2    Review of Systems  Constitutional:     Denies fever, chills ,fatigue ,weakness ,weight loss, weight gain     ENT: Denies earache ,loss of hearing ,nosebleed, nasal discharge,nasal congestion ,sore throat ,hoarseness  Pulmonary: Denies shortness of breath ,cough  ,dyspnea on exertion, orthopnea  ,PND   Cardiovascular:  Denies bradycardia , tachycardia  ,palpations, lower extremity edema leg, claudication  Breast:  Denies new or changing breast lumps ,nipple discharge ,nipple changes  Abdomen:  Denies abdominal pain , anorexia , indigestion, nausea, vomiting, constipation, diarrhea  Musculoskeletal: Denies myalgias, joint swelling, joint stiffness , + cervical pain with radiation down left arm and paresthesia of left arm  Gu: denies dysuria, polyuria  Skin: Denies skin rash, skin lesion, skin wound, itching, dry skin  Neuro: Denies headache, numbness, tingling, confusion, loss of consciousness, dizziness, vertigo+ weakness of left arm  Psychiatric: Denies feelings of depression, suicidal ideation, anxiety, sleep disturbances    OBJECTIVE  /78   Pulse 74   Temp 99 7 °F (37 6 °C)   Ht 5' 3 5" (1 613 m)   Wt 65 3 kg (144 lb)   BMI 25 11 kg/m²   Constitutional:   NAD, well appearing and well nourished      ENT:   Conjunctiva and lids: no injection, edema, or discharge     Pupils and iris: LEXIE bilaterally    External inspection of ears and nose: normal without deformities or discharge  Otoscopic exam: Canals patent without erythema  Nasal mucosa, septum and turbinates: Normal or edema or discharge         Oropharynx:  Moist mucosa, normal tongue and tonsils without lesions  No erythema        Pulmonary:Respiratory effort normal rate and rhythm, no increased work of breathing  Auscultation of lungs:  Clear bilaterally with no adventitious breath sounds       Cardiovascular: regular rate and rhythm, S1 and S2, no murmur, no edema and/or varicosities of LE      Abdomen: Soft and non-distended     Positive bowel sounds      No heptomegaly or splenomegaly      Gu: no suprapubic tenderness or CVA tenderness, no urethral discharge  Lymphatic:  No anterior or posterior cervical lymphadenopathy         Musculoskeletal:  Gait and station: Normal gait      Digits and nails normal without clubbing or cyanosis       Inspection/palpation of joints, bones, and muscles:  + left cervical cervical pillar tenderness and tenderenss of left trap area on palpation, full active and passive range of motion of cervical spine with pain on flexion and extension of cspine      Skin: Normal skin turgor and no rashes      Neuro:   Motor of upper extremities +2/4 left upper extremeites and right upper extremity +4/4 Normal reflexes     Psych:   alert and oriented to person, place and time     normal mood and affect       Assessment/Plan:Diagnoses and all orders for this visit:    Cervical radiculopathy  -     MRI cervical spine wo contrast; Future        Reviewed with patient plan to treat with above plan      Patient instructed to call in 72 hours if not feeling better or if symptoms worsen

## 2020-08-28 ENCOUNTER — HOSPITAL ENCOUNTER (OUTPATIENT)
Dept: MRI IMAGING | Facility: HOSPITAL | Age: 60
Discharge: HOME | End: 2020-08-28
Payer: COMMERCIAL

## 2020-08-28 DIAGNOSIS — M54.12 CERVICAL RADICULOPATHY: ICD-10-CM

## 2020-08-28 PROCEDURE — 72141 MRI NECK SPINE W/O DYE: CPT

## 2020-08-28 PROCEDURE — G1004 CDSM NDSC: HCPCS

## 2020-08-31 DIAGNOSIS — M54.12 CERVICAL RADICULOPATHY: Primary | ICD-10-CM

## 2020-10-05 ENCOUNTER — TRANSCRIBE ORDERS (OUTPATIENT)
Dept: PAIN MEDICINE | Facility: CLINIC | Age: 60
End: 2020-10-05

## 2020-10-05 ENCOUNTER — CONSULT (OUTPATIENT)
Dept: PAIN MEDICINE | Facility: CLINIC | Age: 60
End: 2020-10-05
Payer: COMMERCIAL

## 2020-10-05 VITALS
DIASTOLIC BLOOD PRESSURE: 79 MMHG | SYSTOLIC BLOOD PRESSURE: 136 MMHG | WEIGHT: 145 LBS | HEART RATE: 78 BPM | TEMPERATURE: 98.3 F | BODY MASS INDEX: 25.28 KG/M2

## 2020-10-05 DIAGNOSIS — M50.120 CERVICAL DISC DISORDER WITH RADICULOPATHY OF MID-CERVICAL REGION: Primary | ICD-10-CM

## 2020-10-05 DIAGNOSIS — M81.8 OTHER OSTEOPOROSIS WITHOUT CURRENT PATHOLOGICAL FRACTURE: ICD-10-CM

## 2020-10-05 PROCEDURE — 99244 OFF/OP CNSLTJ NEW/EST MOD 40: CPT | Performed by: ANESTHESIOLOGY

## 2020-10-05 RX ORDER — MELOXICAM 15 MG/1
15 TABLET ORAL DAILY
Qty: 30 TABLET | Refills: 1 | Status: SHIPPED | OUTPATIENT
Start: 2020-10-05 | End: 2020-12-10

## 2020-10-12 ENCOUNTER — EVALUATION (OUTPATIENT)
Dept: PHYSICAL THERAPY | Facility: MEDICAL CENTER | Age: 60
End: 2020-10-12
Payer: COMMERCIAL

## 2020-10-12 DIAGNOSIS — M50.120 CERVICAL DISC DISORDER WITH RADICULOPATHY OF MID-CERVICAL REGION: ICD-10-CM

## 2020-10-12 PROCEDURE — 97140 MANUAL THERAPY 1/> REGIONS: CPT | Performed by: PHYSICAL THERAPIST

## 2020-10-12 PROCEDURE — 97161 PT EVAL LOW COMPLEX 20 MIN: CPT | Performed by: PHYSICAL THERAPIST

## 2020-10-12 PROCEDURE — 97112 NEUROMUSCULAR REEDUCATION: CPT | Performed by: PHYSICAL THERAPIST

## 2020-10-14 ENCOUNTER — OFFICE VISIT (OUTPATIENT)
Dept: PHYSICAL THERAPY | Facility: MEDICAL CENTER | Age: 60
End: 2020-10-14
Payer: COMMERCIAL

## 2020-10-14 DIAGNOSIS — M50.120 CERVICAL DISC DISORDER WITH RADICULOPATHY OF MID-CERVICAL REGION: Primary | ICD-10-CM

## 2020-10-14 PROCEDURE — 97112 NEUROMUSCULAR REEDUCATION: CPT | Performed by: PHYSICAL THERAPIST

## 2020-10-14 PROCEDURE — 97110 THERAPEUTIC EXERCISES: CPT | Performed by: PHYSICAL THERAPIST

## 2020-10-14 PROCEDURE — 97140 MANUAL THERAPY 1/> REGIONS: CPT | Performed by: PHYSICAL THERAPIST

## 2020-10-19 ENCOUNTER — OFFICE VISIT (OUTPATIENT)
Dept: PHYSICAL THERAPY | Facility: MEDICAL CENTER | Age: 60
End: 2020-10-19
Payer: COMMERCIAL

## 2020-10-19 DIAGNOSIS — M50.120 CERVICAL DISC DISORDER WITH RADICULOPATHY OF MID-CERVICAL REGION: Primary | ICD-10-CM

## 2020-10-19 PROCEDURE — 97140 MANUAL THERAPY 1/> REGIONS: CPT

## 2020-10-19 PROCEDURE — 97112 NEUROMUSCULAR REEDUCATION: CPT

## 2020-10-19 PROCEDURE — 97110 THERAPEUTIC EXERCISES: CPT

## 2020-10-22 ENCOUNTER — OFFICE VISIT (OUTPATIENT)
Dept: PHYSICAL THERAPY | Facility: MEDICAL CENTER | Age: 60
End: 2020-10-22
Payer: COMMERCIAL

## 2020-10-22 DIAGNOSIS — M50.120 CERVICAL DISC DISORDER WITH RADICULOPATHY OF MID-CERVICAL REGION: Primary | ICD-10-CM

## 2020-10-22 PROCEDURE — 97140 MANUAL THERAPY 1/> REGIONS: CPT | Performed by: PHYSICAL THERAPIST

## 2020-10-22 PROCEDURE — 97112 NEUROMUSCULAR REEDUCATION: CPT | Performed by: PHYSICAL THERAPIST

## 2020-10-26 ENCOUNTER — APPOINTMENT (OUTPATIENT)
Dept: PHYSICAL THERAPY | Facility: MEDICAL CENTER | Age: 60
End: 2020-10-26
Payer: COMMERCIAL

## 2020-10-29 ENCOUNTER — OFFICE VISIT (OUTPATIENT)
Dept: PHYSICAL THERAPY | Facility: MEDICAL CENTER | Age: 60
End: 2020-10-29
Payer: COMMERCIAL

## 2020-10-29 DIAGNOSIS — M50.120 CERVICAL DISC DISORDER WITH RADICULOPATHY OF MID-CERVICAL REGION: Primary | ICD-10-CM

## 2020-10-29 PROCEDURE — 97110 THERAPEUTIC EXERCISES: CPT | Performed by: PHYSICAL THERAPIST

## 2020-10-29 PROCEDURE — 97140 MANUAL THERAPY 1/> REGIONS: CPT | Performed by: PHYSICAL THERAPIST

## 2020-11-02 ENCOUNTER — OFFICE VISIT (OUTPATIENT)
Dept: PHYSICAL THERAPY | Facility: MEDICAL CENTER | Age: 60
End: 2020-11-02
Payer: COMMERCIAL

## 2020-11-02 DIAGNOSIS — M50.120 CERVICAL DISC DISORDER WITH RADICULOPATHY OF MID-CERVICAL REGION: Primary | ICD-10-CM

## 2020-11-02 PROCEDURE — 97110 THERAPEUTIC EXERCISES: CPT | Performed by: PHYSICAL THERAPIST

## 2020-11-02 PROCEDURE — 97112 NEUROMUSCULAR REEDUCATION: CPT | Performed by: PHYSICAL THERAPIST

## 2020-11-02 PROCEDURE — 97140 MANUAL THERAPY 1/> REGIONS: CPT | Performed by: PHYSICAL THERAPIST

## 2020-11-05 ENCOUNTER — OFFICE VISIT (OUTPATIENT)
Dept: PHYSICAL THERAPY | Facility: MEDICAL CENTER | Age: 60
End: 2020-11-05
Payer: COMMERCIAL

## 2020-11-05 DIAGNOSIS — M50.120 CERVICAL DISC DISORDER WITH RADICULOPATHY OF MID-CERVICAL REGION: Primary | ICD-10-CM

## 2020-11-05 PROCEDURE — 97140 MANUAL THERAPY 1/> REGIONS: CPT | Performed by: PHYSICAL THERAPIST

## 2020-11-05 PROCEDURE — 97110 THERAPEUTIC EXERCISES: CPT | Performed by: PHYSICAL THERAPIST

## 2020-12-08 ENCOUNTER — OFFICE VISIT (OUTPATIENT)
Dept: PAIN MEDICINE | Facility: CLINIC | Age: 60
End: 2020-12-08
Payer: COMMERCIAL

## 2020-12-08 VITALS
TEMPERATURE: 97.9 F | BODY MASS INDEX: 24.92 KG/M2 | WEIGHT: 146 LBS | DIASTOLIC BLOOD PRESSURE: 82 MMHG | HEIGHT: 64 IN | RESPIRATION RATE: 16 BRPM | SYSTOLIC BLOOD PRESSURE: 124 MMHG | HEART RATE: 90 BPM

## 2020-12-08 DIAGNOSIS — G89.4 CHRONIC PAIN SYNDROME: Primary | ICD-10-CM

## 2020-12-08 DIAGNOSIS — M50.120 CERVICAL DISC DISORDER WITH RADICULOPATHY OF MID-CERVICAL REGION: ICD-10-CM

## 2020-12-08 PROCEDURE — 99214 OFFICE O/P EST MOD 30 MIN: CPT | Performed by: NURSE PRACTITIONER

## 2020-12-08 RX ORDER — NAPROXEN 500 MG/1
500 TABLET ORAL 2 TIMES DAILY WITH MEALS
Qty: 60 TABLET | Refills: 0 | Status: SHIPPED | OUTPATIENT
Start: 2020-12-08 | End: 2021-10-01

## 2020-12-09 ENCOUNTER — TELEPHONE (OUTPATIENT)
Dept: PAIN MEDICINE | Facility: CLINIC | Age: 60
End: 2020-12-09

## 2020-12-10 ENCOUNTER — TELEMEDICINE (OUTPATIENT)
Dept: FAMILY MEDICINE CLINIC | Facility: CLINIC | Age: 60
End: 2020-12-10
Payer: COMMERCIAL

## 2020-12-10 ENCOUNTER — TELEPHONE (OUTPATIENT)
Dept: FAMILY MEDICINE CLINIC | Facility: CLINIC | Age: 60
End: 2020-12-10

## 2020-12-10 DIAGNOSIS — M54.12 CERVICAL RADICULOPATHY AT C5: Primary | ICD-10-CM

## 2020-12-10 PROCEDURE — 99213 OFFICE O/P EST LOW 20 MIN: CPT | Performed by: FAMILY MEDICINE

## 2020-12-10 RX ORDER — HYDROCODONE BITARTRATE AND ACETAMINOPHEN 7.5; 325 MG/1; MG/1
1 TABLET ORAL EVERY 6 HOURS PRN
Qty: 40 TABLET | Refills: 0 | Status: SHIPPED | OUTPATIENT
Start: 2020-12-10 | End: 2020-12-20

## 2020-12-10 RX ORDER — METHOCARBAMOL 500 MG/1
500 TABLET, FILM COATED ORAL 3 TIMES DAILY
Qty: 30 TABLET | Refills: 1 | Status: SHIPPED | OUTPATIENT
Start: 2020-12-10 | End: 2021-10-01

## 2020-12-10 RX ORDER — PREDNISONE 10 MG/1
TABLET ORAL
Qty: 26 TABLET | Refills: 0 | Status: SHIPPED | OUTPATIENT
Start: 2020-12-10 | End: 2020-12-22 | Stop reason: ALTCHOICE

## 2020-12-11 ENCOUNTER — TELEPHONE (OUTPATIENT)
Dept: PAIN MEDICINE | Facility: CLINIC | Age: 60
End: 2020-12-11

## 2020-12-14 ENCOUNTER — TELEPHONE (OUTPATIENT)
Dept: PAIN MEDICINE | Facility: CLINIC | Age: 60
End: 2020-12-14

## 2020-12-16 ENCOUNTER — TRANSCRIBE ORDERS (OUTPATIENT)
Dept: PAIN MEDICINE | Facility: CLINIC | Age: 60
End: 2020-12-16

## 2020-12-22 ENCOUNTER — TRANSCRIBE ORDERS (OUTPATIENT)
Dept: PAIN MEDICINE | Facility: CLINIC | Age: 60
End: 2020-12-22

## 2020-12-22 ENCOUNTER — HOSPITAL ENCOUNTER (OUTPATIENT)
Dept: RADIOLOGY | Facility: CLINIC | Age: 60
Discharge: HOME | End: 2020-12-22
Attending: ANESTHESIOLOGY | Admitting: ANESTHESIOLOGY
Payer: COMMERCIAL

## 2020-12-22 VITALS
OXYGEN SATURATION: 97 % | RESPIRATION RATE: 20 BRPM | HEART RATE: 74 BPM | DIASTOLIC BLOOD PRESSURE: 84 MMHG | SYSTOLIC BLOOD PRESSURE: 146 MMHG | TEMPERATURE: 99.1 F

## 2020-12-22 DIAGNOSIS — M50.120 CERVICAL DISC DISORDER WITH RADICULOPATHY OF MID-CERVICAL REGION: ICD-10-CM

## 2020-12-22 PROCEDURE — 62321 NJX INTERLAMINAR CRV/THRC: CPT | Performed by: ANESTHESIOLOGY

## 2020-12-22 RX ORDER — METHYLPREDNISOLONE ACETATE 80 MG/ML
80 INJECTION, SUSPENSION INTRA-ARTICULAR; INTRALESIONAL; INTRAMUSCULAR; PARENTERAL; SOFT TISSUE ONCE
Status: COMPLETED | OUTPATIENT
Start: 2020-12-22 | End: 2020-12-22

## 2020-12-22 RX ORDER — LIDOCAINE HYDROCHLORIDE 10 MG/ML
5 INJECTION, SOLUTION EPIDURAL; INFILTRATION; INTRACAUDAL; PERINEURAL ONCE
Status: COMPLETED | OUTPATIENT
Start: 2020-12-22 | End: 2020-12-22

## 2020-12-22 RX ADMIN — LIDOCAINE HYDROCHLORIDE 4 ML: 10 INJECTION, SOLUTION EPIDURAL; INFILTRATION; INTRACAUDAL; PERINEURAL at 11:02

## 2020-12-22 RX ADMIN — IOHEXOL 1 ML: 300 INJECTION, SOLUTION INTRAVENOUS at 11:04

## 2020-12-22 RX ADMIN — METHYLPREDNISOLONE ACETATE 80 MG: 80 INJECTION, SUSPENSION INTRA-ARTICULAR; INTRALESIONAL; INTRAMUSCULAR; PARENTERAL; SOFT TISSUE at 11:04

## 2020-12-29 ENCOUNTER — HOSPITAL ENCOUNTER (OUTPATIENT)
Dept: RADIOLOGY | Facility: MEDICAL CENTER | Age: 60
Discharge: HOME | End: 2020-12-29
Payer: COMMERCIAL

## 2020-12-29 ENCOUNTER — TELEPHONE (OUTPATIENT)
Dept: PAIN MEDICINE | Facility: CLINIC | Age: 60
End: 2020-12-29

## 2020-12-29 DIAGNOSIS — M81.8 OTHER OSTEOPOROSIS WITHOUT CURRENT PATHOLOGICAL FRACTURE: ICD-10-CM

## 2020-12-29 PROCEDURE — 77080 DXA BONE DENSITY AXIAL: CPT

## 2020-12-30 ENCOUNTER — TELEPHONE (OUTPATIENT)
Dept: PAIN MEDICINE | Facility: CLINIC | Age: 60
End: 2020-12-30

## 2021-01-06 ENCOUNTER — TELEPHONE (OUTPATIENT)
Dept: FAMILY MEDICINE CLINIC | Facility: CLINIC | Age: 61
End: 2021-01-06

## 2021-01-06 ENCOUNTER — IMMUNIZATIONS (OUTPATIENT)
Dept: FAMILY MEDICINE CLINIC | Facility: HOSPITAL | Age: 61
End: 2021-01-06

## 2021-01-06 DIAGNOSIS — M51.16 INTERVERTEBRAL DISC DISORDER WITH RADICULOPATHY OF LUMBAR REGION: Primary | ICD-10-CM

## 2021-01-06 DIAGNOSIS — Z23 ENCOUNTER FOR IMMUNIZATION: ICD-10-CM

## 2021-01-06 PROCEDURE — 0011A SARS-COV-2 / COVID-19 MRNA VACCINE (MODERNA) 100 MCG: CPT

## 2021-01-06 PROCEDURE — 91301 SARS-COV-2 / COVID-19 MRNA VACCINE (MODERNA) 100 MCG: CPT

## 2021-01-06 RX ORDER — GABAPENTIN 300 MG/1
300 CAPSULE ORAL
Qty: 30 CAPSULE | Refills: 1 | Status: SHIPPED | OUTPATIENT
Start: 2021-01-06 | End: 2021-10-01

## 2021-01-06 NOTE — TELEPHONE ENCOUNTER
Patient called  She had a DEXA on 12/29/20 and her T score is at -3  6  Dr Salo Amin wants her to start Prolia injections  I am going to start a benefit investigation  She has been on Forteo and Fosamax in the past  Jonathon Esters made her nauseated and Fosamax gave her palpitations

## 2021-01-19 ENCOUNTER — CLINICAL SUPPORT (OUTPATIENT)
Dept: FAMILY MEDICINE CLINIC | Facility: CLINIC | Age: 61
End: 2021-01-19
Payer: COMMERCIAL

## 2021-01-19 VITALS — TEMPERATURE: 98 F

## 2021-01-19 DIAGNOSIS — M81.8 OTHER OSTEOPOROSIS WITHOUT CURRENT PATHOLOGICAL FRACTURE: ICD-10-CM

## 2021-01-19 PROCEDURE — 96372 THER/PROPH/DIAG INJ SC/IM: CPT | Performed by: FAMILY MEDICINE

## 2021-01-19 NOTE — PROGRESS NOTES
Patient is here today for Prolia injection  Received in left arm with no issues  Patient aware to call with any questions or concerns

## 2021-02-03 ENCOUNTER — IMMUNIZATIONS (OUTPATIENT)
Dept: FAMILY MEDICINE CLINIC | Facility: HOSPITAL | Age: 61
End: 2021-02-03

## 2021-02-03 DIAGNOSIS — Z23 ENCOUNTER FOR IMMUNIZATION: Primary | ICD-10-CM

## 2021-02-03 PROCEDURE — 91301 SARS-COV-2 / COVID-19 MRNA VACCINE (MODERNA) 100 MCG: CPT

## 2021-02-03 PROCEDURE — 0012A SARS-COV-2 / COVID-19 MRNA VACCINE (MODERNA) 100 MCG: CPT

## 2021-02-26 ENCOUNTER — TELEPHONE (OUTPATIENT)
Dept: RADIOLOGY | Facility: CLINIC | Age: 61
End: 2021-02-26

## 2021-02-26 ENCOUNTER — HOSPITAL ENCOUNTER (OUTPATIENT)
Dept: RADIOLOGY | Facility: CLINIC | Age: 61
Discharge: HOME | End: 2021-02-26
Attending: ANESTHESIOLOGY | Admitting: ANESTHESIOLOGY
Payer: COMMERCIAL

## 2021-02-26 VITALS
RESPIRATION RATE: 18 BRPM | DIASTOLIC BLOOD PRESSURE: 82 MMHG | SYSTOLIC BLOOD PRESSURE: 133 MMHG | TEMPERATURE: 99.1 F | HEART RATE: 70 BPM | OXYGEN SATURATION: 95 %

## 2021-02-26 DIAGNOSIS — M50.120 CERVICAL DISC DISORDER WITH RADICULOPATHY OF MID-CERVICAL REGION: ICD-10-CM

## 2021-02-26 PROCEDURE — 62321 NJX INTERLAMINAR CRV/THRC: CPT | Performed by: ANESTHESIOLOGY

## 2021-02-26 RX ORDER — LIDOCAINE HYDROCHLORIDE 10 MG/ML
5 INJECTION, SOLUTION EPIDURAL; INFILTRATION; INTRACAUDAL; PERINEURAL ONCE
Status: COMPLETED | OUTPATIENT
Start: 2021-02-26 | End: 2021-02-26

## 2021-02-26 RX ORDER — METHYLPREDNISOLONE ACETATE 80 MG/ML
80 INJECTION, SUSPENSION INTRA-ARTICULAR; INTRALESIONAL; INTRAMUSCULAR; PARENTERAL; SOFT TISSUE ONCE
Status: COMPLETED | OUTPATIENT
Start: 2021-02-26 | End: 2021-02-26

## 2021-02-26 RX ADMIN — IOHEXOL 1 ML: 300 INJECTION, SOLUTION INTRAVENOUS at 11:46

## 2021-02-26 RX ADMIN — METHYLPREDNISOLONE ACETATE 80 MG: 80 INJECTION, SUSPENSION INTRA-ARTICULAR; INTRALESIONAL; INTRAMUSCULAR; PARENTERAL; SOFT TISSUE at 11:46

## 2021-02-26 RX ADMIN — LIDOCAINE HYDROCHLORIDE 4 ML: 10 INJECTION, SOLUTION EPIDURAL; INFILTRATION; INTRACAUDAL; PERINEURAL at 11:44

## 2021-02-26 NOTE — TELEPHONE ENCOUNTER
Miguel Villalobos To   [facility]   2/26/2021  3:31 PM   Hi, this msg is for Dr Amrita Moore, I read in My Chart test results today, the injection was done at the same site as Dec 22, the C7-T1, I thought or believe it was at the C5-6 and  C6-7, can this be updated in My Chart, thank you

## 2021-02-26 NOTE — DISCHARGE INSTR - LAB
Epidural Steroid Injection   WHAT YOU NEED TO KNOW:   An epidural steroid injection (CARL) is a procedure to inject steroid medicine into the epidural space  The epidural space is between your spinal cord and vertebrae  Steroids reduce inflammation and fluid buildup in your spine that may be causing pain  You may be given pain medicine along with the steroids  ACTIVITY  · Do not drive or operate machinery today  · No strenuous activity today - bending, lifting, etc   · You may resume normal activites starting tomorrow - start slowly and as tolerated  · You may shower today, but no tub baths or hot tubs  · You may have numbness for several hours from the local anesthetic  Please use caution and common sense, especially with weight-bearing activities  CARE OF THE INJECTION SITE  · If you have soreness or pain, apply ice to the area today (20 minutes on/20 minutes off)  · Starting tomorrow, you may use warm, moist heat or ice if needed  · You may have an increase or change in your discomfort for 36-48 hours after your treatment  · Apply ice and continue with any pain medication you have been prescribed  · Notify [facility] if you have any of the following: redness, drainage, swelling, headache, stiff neck or fever above 100°F     SPECIAL INSTRUCTIONS  · Our office will contact you in approximately 7 days for a progress report  MEDICATIONS  · Continue to take all routine medications  · Our office may have instructed you to hold some medications  If you have a problem specifically related to your procedure, please call our office at [phone]  Problems not related to your procedure should be directed to your primary care physician

## 2021-02-26 NOTE — H&P
History of Present Illness: The patient is a 61 y o  female who presents with complaints of Neck and shoulder pain secondary cervical disc bulging and is here today for cervical epidural steroid injection      Patient Active Problem List   Diagnosis    Anxiety    Hypothyroidism    Osteoporosis    Effusion of right knee    Right knee pain    Cervical disc disorder with radiculopathy of mid-cervical region       Past Medical History:   Diagnosis Date    Cataract, right     Detached retina, right     Disease of thyroid gland     OCD (obsessive compulsive disorder)     Seasonal allergies     Wrist fracture        Past Surgical History:   Procedure Laterality Date    CATARACT EXTRACTION      EYE SURGERY      FOOT SURGERY      MANDIBLE FRACTURE SURGERY      TONSILLECTOMY           Current Outpatient Medications:     gabapentin (NEURONTIN) 300 mg capsule, Take 1 capsule (300 mg total) by mouth daily at bedtime, Disp: 30 capsule, Rfl: 1    levothyroxine 125 mcg tablet, TAKE ONE TABLET BY MOUTH EVERY DAY`, Disp: 90 tablet, Rfl: 0    methocarbamol (ROBAXIN) 500 mg tablet, Take 1 tablet (500 mg total) by mouth 3 (three) times a day for 10 days, Disp: 30 tablet, Rfl: 1    naproxen (NAPROSYN) 500 mg tablet, Take 1 tablet (500 mg total) by mouth 2 (two) times a day with meals, Disp: 60 tablet, Rfl: 0    sertraline (ZOLOFT) 100 mg tablet, Take 1 tablet (100 mg total) by mouth daily, Disp: 90 tablet, Rfl: 1    Current Facility-Administered Medications:     iohexol (OMNIPAQUE) 300 mg/mL injection 50 mL, 50 mL, Epidural, Once, Whitney Bhatti MD    lidocaine (PF) (XYLOCAINE-MPF) 1 % injection 5 mL, 5 mL, Infiltration, Once, Whitney Bhatti MD    methylPREDNISolone acetate (DEPO-MEDROL) injection 80 mg, 80 mg, Epidural, Once, Whitney Bhatti MD    No Known Allergies    Physical Exam:   Vitals:    02/26/21 1131   BP: 144/80   Pulse: 74   Resp: 18   Temp: 99 1 °F (37 3 °C)   SpO2: 95%     General: Awake, Alert, Oriented x 3, Mood and affect appropriate  Respiratory: Respirations even and unlabored  Cardiovascular: Peripheral pulses intact; no edema  Musculoskeletal Exam:   Neck pain    ASA Score: 2    Patient/Chart Verification  Patient ID Verified: Verbal  ID Band Applied: No  Consents Confirmed: Procedural, To be obtained in the Pre-Procedure area  H&P( within 30 days) Verified: To be obtained in the Pre-Procedure area  Allergies Reviewed: Yes  Anticoag/NSAID held?: No  Currently on antibiotics?: No    Assessment:   1   Cervical disc disorder with radiculopathy of mid-cervical region        Plan: MANUEL

## 2021-02-26 NOTE — TELEPHONE ENCOUNTER
RN s/w pt and explained that her MRI shows that she has a small disc buldg at C5-C6 and stenosis or narrowing at C6-C7 but FQ places the needle at C7-T1 and then the medicine is directed to those areas  Pt appreciated the c/b and explanation

## 2021-03-05 ENCOUNTER — TELEPHONE (OUTPATIENT)
Dept: PAIN MEDICINE | Facility: CLINIC | Age: 61
End: 2021-03-05

## 2021-06-02 ENCOUNTER — TELEPHONE (OUTPATIENT)
Dept: FAMILY MEDICINE CLINIC | Facility: CLINIC | Age: 61
End: 2021-06-02

## 2021-06-28 ENCOUNTER — APPOINTMENT (OUTPATIENT)
Dept: RADIOLOGY | Facility: MEDICAL CENTER | Age: 61
End: 2021-06-28
Payer: COMMERCIAL

## 2021-06-28 DIAGNOSIS — M54.50 LUMBAR BACK PAIN: Primary | ICD-10-CM

## 2021-06-28 DIAGNOSIS — M54.50 LUMBAR BACK PAIN: ICD-10-CM

## 2021-06-28 PROCEDURE — 72110 X-RAY EXAM L-2 SPINE 4/>VWS: CPT

## 2021-06-29 ENCOUNTER — TELEPHONE (OUTPATIENT)
Dept: PHYSICAL THERAPY | Facility: OTHER | Age: 61
End: 2021-06-29

## 2021-06-29 DIAGNOSIS — M43.17 SPONDYLOLISTHESIS AT L5-S1 LEVEL: Primary | ICD-10-CM

## 2021-06-29 NOTE — TELEPHONE ENCOUNTER
Called patient per  left today  Voice message left for patient to call back  Phone number and hours of business provided  This the 2nd attempt to reach the patient  Will close referral per protocol

## 2021-06-29 NOTE — TELEPHONE ENCOUNTER
Call placed to the patient per [facility] referral and VM left today  Voice message left for patient to call back  Phone number and hours of business provided  This is the 1st attempt to reach the patient    Will defer and await a call back to discuss our program

## 2021-06-30 ENCOUNTER — TELEPHONE (OUTPATIENT)
Dept: PHYSICAL THERAPY | Facility: OTHER | Age: 61
End: 2021-06-30

## 2021-06-30 ENCOUNTER — NURSE TRIAGE (OUTPATIENT)
Dept: PHYSICAL THERAPY | Facility: OTHER | Age: 61
End: 2021-06-30

## 2021-06-30 DIAGNOSIS — M54.41 ACUTE RIGHT-SIDED LOW BACK PAIN WITH BILATERAL SCIATICA: Primary | ICD-10-CM

## 2021-06-30 DIAGNOSIS — M54.42 ACUTE RIGHT-SIDED LOW BACK PAIN WITH BILATERAL SCIATICA: Primary | ICD-10-CM

## 2021-06-30 NOTE — TELEPHONE ENCOUNTER
Additional Information   Negative: Is this related to a work injury?  Negative: Is this related to an MVA?  Negative: Are you currently recieving homecare services?  Negative: Does the patient have a fever?  Negative: Has the patient had unexplained weight loss?  Negative: Is the patient experiencing blood in sputum?  Negative: Has the patient experienced major trauma? (fall from height, high speed collision, direct blow to spine) and is also experiencing nausea, light-headedness, or loss of consciousness?  Negative: Is the patient experiencing urine retention?  Negative: Is the patient experiencing acute drop foot or paralysis?  Negative: Is this a chronic condition? Background - Initial Assessment  Clinical complaint: Right sided low back pain that radiates to Right buttock  Date of onset: About 2 weeks ago after gardening  Frequency of pain: constant  Quality of pain: shooting, burning    Protocols used: [facility] COMPREHENSIVE SPINE PROGRAM PROTOCOL      Patient reached out to provider regarding current low back pain- CS referral entered  Patient LM for CS today requesting CB at work to discuss program  Nurse reviewed triage, referral and evaluation with her and agreed to participate  Triaged for above complaints and NO RF s/s present  Referral entered for [place] site and contact number given to her as well  Nurse also informed the patient a call will be received from the behavioral office d/t score  Agreed and understood  Referral placed in EMR for [facility]  Patient very appreciative of CB  Nurse wished her well and referral already closed

## 2021-06-30 NOTE — TELEPHONE ENCOUNTER
Voice mail/message left requesting patient to return call to [email or web address] [facility] program including our hours of business and phone number  Kindly asked to LM with Full Name,  and Reminded CB may come from a non-SL number as the nurses are working remotely/off-site  Referral has been closed  Will await CB from pt  Note:-Patient LM yesterday after 4p- returning call made by other RN  This nurse LM for pt as she stated her work hours are [phone] and permission given to LM  Nurse let her know attempt will be made to reach pt around 330 today, but encouraged her to LM when she arrives home or available for 5-20 mins

## 2021-07-01 ENCOUNTER — EVALUATION (OUTPATIENT)
Dept: PHYSICAL THERAPY | Facility: MEDICAL CENTER | Age: 61
End: 2021-07-01
Payer: COMMERCIAL

## 2021-07-01 VITALS — SYSTOLIC BLOOD PRESSURE: 122 MMHG | DIASTOLIC BLOOD PRESSURE: 82 MMHG

## 2021-07-01 DIAGNOSIS — M54.42 ACUTE RIGHT-SIDED LOW BACK PAIN WITH BILATERAL SCIATICA: Primary | ICD-10-CM

## 2021-07-01 DIAGNOSIS — M54.41 ACUTE RIGHT-SIDED LOW BACK PAIN WITH BILATERAL SCIATICA: Primary | ICD-10-CM

## 2021-07-01 PROCEDURE — 97162 PT EVAL MOD COMPLEX 30 MIN: CPT | Performed by: PHYSICAL THERAPIST

## 2021-07-01 PROCEDURE — 97110 THERAPEUTIC EXERCISES: CPT | Performed by: PHYSICAL THERAPIST

## 2021-07-01 NOTE — PROGRESS NOTES
PT Evaluation     Today's date: 2021  Patient name: Nj Florence  : 1960  MRN: [id number]  Referring provider: Loli Weeks PT  Dx:   Encounter Diagnosis     ICD-10-CM    1  Acute right-sided low back pain with bilateral sciatica  M54 42 Ambulatory referral to PT spine    M54 41                   Assessment  Assessment details: Patient presents with signs and symptoms consistent with referring diagnosis  She presents with a lumbar R sided above the knee posterior derangement with a treatment principle of lumbar extension  Positive prognostic indicators include:  Acute presentation, motivated to improve, directional preference and partial reduction in symptoms found at IE  Negative prognostic indicators include: (-)  She presents with: pain, decreased: ROM, strength and  functional capacity  She requires skilled PT to address these deficits and restore maximal functional capacity  She presents as part of the Comprehensive Spine program     Impairments: abnormal or restricted ROM, activity intolerance, impaired physical strength, lacks appropriate home exercise program, pain with function, poor posture  and poor body mechanics  Understanding of Dx/Px/POC: good   Prognosis: good    Goals  ST-6 weeks  1  Patient to be independent with HEP  2   Decrease pain at least 2 subjective levels  LT-12 weeks  1    Patient to voice comfort with self management of condition  2   75% or > decreased pain  3   75% or > decreased functional deficits  4   Normalize AROM of all deficit planes  5   Normalize strength  7   Patient to voice understanding of activities/positions to avoid        Plan  Patient would benefit from: skilled PT  Referral necessary: No  Planned modality interventions: cryotherapy  Planned therapy interventions: IADL retraining, joint mobilization, manual therapy, motor coordination training, neuromuscular re-education, patient education, postural training, self care, strengthening, stretching, therapeutic activities, therapeutic exercise, home exercise program, flexibility, ADL training, balance and body mechanics training  Frequency: 2x week  Duration in weeks: 6  Treatment plan discussed with: patient        Subjective Evaluation    History of Present Illness  Onset date: 2 weeks ago  Mechanism of injury: Chief Complaint: R LBP/buttock pain    History:  Pt notes onset of LBP beginning 2 weeks ago after gardening for an hour straight  She was seen by a chiropractor for a few visits with treatment including adjustments  She contacted her PCP and was referred to the comprehensive spine program and had xrays  Symptoms have been unchanged since onset  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, which involves mostly sitting  She enjoys working outside including gardening as well as crafts as well as with her grandchildren   She denies constitutional changes    PMH: Neck pain, 1 prior episode of LBP, bunion surgery     Aggravating factors:  Bending, getting OOB; upon waking  Relieving factors: unsure    Functional Deficits: Bending, brushing teeth, putting shoes on, toileting, bathing, gardening    Patient Goals: Be able to bend, "a lot less pain"    Quality of life: good    Pain  At best pain ratin  At worst pain ratin  Location: R LBP- paresthesias in R foot toes          Objective     Postural Observations  Seated posture: fair  Standing posture: fair  Correction of posture: makes symptoms worse        Active Range of Motion     Lumbar   Flexion:  with pain Restriction level: maximal  Extension:  with pain Restriction level: moderate    Joint Play     Hypomobile: L1, L2, L3, L4, L5 and S1     Pain: L1, L2, L3, L4, L5 and S1     General Comments:      Lumbar Comments  Slump: (-)  Neuromotor Screen: (-)    SLR 70 B, painful  XSLR: (+)    Hip Screen: (-)    ADA (+) MILLICENT Vaz (-)    Graphical documentation             Precautions: (-)      Manuals  Prone Lumbar PA mobs Gr 2-4                                                    Neuro Re-Ed             Pt ed: Lx Roll             Avoidance of BLT             Back account                          Abd Paty             Mult TB Rot                          Ther Ex             HEP 10'            EIL             EIL pt OP             Sust Est             EIS             EIS pt OP                                       Ther Activity                                       Gait Training                                       Modalities

## 2021-07-08 ENCOUNTER — OFFICE VISIT (OUTPATIENT)
Dept: PHYSICAL THERAPY | Facility: MEDICAL CENTER | Age: 61
End: 2021-07-08
Payer: COMMERCIAL

## 2021-07-08 DIAGNOSIS — M54.41 ACUTE RIGHT-SIDED LOW BACK PAIN WITH BILATERAL SCIATICA: Primary | ICD-10-CM

## 2021-07-08 DIAGNOSIS — M54.42 ACUTE RIGHT-SIDED LOW BACK PAIN WITH BILATERAL SCIATICA: Primary | ICD-10-CM

## 2021-07-08 PROCEDURE — 97112 NEUROMUSCULAR REEDUCATION: CPT | Performed by: PHYSICAL THERAPIST

## 2021-07-08 PROCEDURE — 97110 THERAPEUTIC EXERCISES: CPT | Performed by: PHYSICAL THERAPIST

## 2021-07-08 PROCEDURE — 97140 MANUAL THERAPY 1/> REGIONS: CPT | Performed by: PHYSICAL THERAPIST

## 2021-07-08 NOTE — PROGRESS NOTES
Daily Note     Today's date: 2021  Patient name: Rhoda Najera  : 1960  MRN: [id number]  Referring provider: Annemarie Man, PT  Dx:   Encounter Diagnosis     ICD-10-CM    1  Acute right-sided low back pain with bilateral sciatica  M54 42     M54 41                   Subjective: 50% improved since beginning therapy  Objective: See treatment diary below   Reviewed posture/body mechanics and HEP  Assessment: Tolerated treatment well  Patient would benefit from continued PT      Plan: Continue per plan of care        Precautions: (-)      Manuals            Prone Lumbar PA mobs Gr 2-4  8'                                                  Neuro Re-Ed             Pt ed: Lx Roll  C           Avoidance of BLT  C           Back account                          Abd Paty  :05 20           Mult TB Rot             Bridges  :03 20           Ther Ex             HEP 10'            EIL  20           EIL pt OP  15           Sust Est             EIS  30           EIS pt OP  30           Standing Hip Ext  20 B           Prone Hip Ext  20 B           Ther Activity                                       Gait Training                                       Modalities

## 2021-07-12 ENCOUNTER — OFFICE VISIT (OUTPATIENT)
Dept: PHYSICAL THERAPY | Facility: MEDICAL CENTER | Age: 61
End: 2021-07-12
Payer: COMMERCIAL

## 2021-07-12 DIAGNOSIS — M54.41 ACUTE RIGHT-SIDED LOW BACK PAIN WITH BILATERAL SCIATICA: Primary | ICD-10-CM

## 2021-07-12 DIAGNOSIS — M54.42 ACUTE RIGHT-SIDED LOW BACK PAIN WITH BILATERAL SCIATICA: Primary | ICD-10-CM

## 2021-07-12 PROCEDURE — 97112 NEUROMUSCULAR REEDUCATION: CPT | Performed by: PHYSICAL THERAPIST

## 2021-07-12 PROCEDURE — 97110 THERAPEUTIC EXERCISES: CPT | Performed by: PHYSICAL THERAPIST

## 2021-07-12 PROCEDURE — 97140 MANUAL THERAPY 1/> REGIONS: CPT | Performed by: PHYSICAL THERAPIST

## 2021-07-12 NOTE — PROGRESS NOTES
Daily Note     Today's date: 2021  Patient name: Mesha Bates  : 1960  MRN: [id number]  Referring provider: Galdino Mcgovern, PT  Dx:   Encounter Diagnosis     ICD-10-CM    1  Acute right-sided low back pain with bilateral sciatica  M54 42     M54 41                   Subjective: A bit stiff after getting 2 new puppies this weekend and being busy with them  Remains compliant with HEP  Objective: See treatment diary below      Assessment: Tolerated treatment well  Patient would benefit from continued PT   Stiffness decreased by end of treatment  Plan: Continue per plan of care        Precautions: (-)      Manuals           Prone Lumbar PA mobs Gr 2-4  8' 8' increased stiffness                                                 Neuro Re-Ed             Pt ed: Lx Roll  C           Avoidance of BLT  C           Back account                          Abd Paty  :05 20 :05 20          Mult TB Rot   G :03 10 B          Bridges  :03 20 :05 02          Ther Ex             HEP 10'            EIL  20 20          EIL pt OP  15 10          Sust Est             EIS  30 30          EIS pt OP  30 30          Standing Hip Ext  20 B 20x B          Prone Hip Ext  20 B 20x B          Ther Activity                                       Gait Training                                       Modalities

## 2021-07-15 ENCOUNTER — OFFICE VISIT (OUTPATIENT)
Dept: PHYSICAL THERAPY | Facility: MEDICAL CENTER | Age: 61
End: 2021-07-15
Payer: COMMERCIAL

## 2021-07-15 DIAGNOSIS — M54.41 ACUTE RIGHT-SIDED LOW BACK PAIN WITH BILATERAL SCIATICA: Primary | ICD-10-CM

## 2021-07-15 DIAGNOSIS — M54.42 ACUTE RIGHT-SIDED LOW BACK PAIN WITH BILATERAL SCIATICA: Primary | ICD-10-CM

## 2021-07-15 PROCEDURE — 97110 THERAPEUTIC EXERCISES: CPT | Performed by: PHYSICAL THERAPIST

## 2021-07-15 PROCEDURE — 97140 MANUAL THERAPY 1/> REGIONS: CPT | Performed by: PHYSICAL THERAPIST

## 2021-07-15 NOTE — PROGRESS NOTES
Daily Note     Today's date: 7/15/2021  Patient name: Oscar Lira  : 1960  MRN: [id number]  Referring provider: Cruz Aguilera, PT  Dx:   Encounter Diagnosis     ICD-10-CM    1  Acute right-sided low back pain with bilateral sciatica  M54 42     M54 41                   Subjective: Increased feeling of back weakness after sitting 3 hours consecutive the other day at work  Objective: See treatment diary below  Reviewed lumbar roll, seated extension and interruption of sitting posture  Assessment: Tolerated treatment well  Patient would benefit from continued PT      Plan: Continue per plan of care        Precautions: (-)      Manuals 7/1 7/8 7/12 7/15         Prone Lumbar PA mobs Gr 2-4  8' 8' increased stiffness 8'                                                Neuro Re-Ed             Pt ed: Lx Roll  C  C         Avoidance of BLT  C  C         Back account                          Abd Paty  :05 20 :05 20 :05 20         Mult TB Rot   G :03 10 B G :03 20         Bridges  :03 20 :05 02 :03 20         Ther Ex             HEP 10'            EIL  20 20 20         EIL pt OP  15 10 10         Sust Est             EIS  30 30 30         EIS pt OP  30 30 30         Standing Hip Ext  20 B 20x B 20x B         Prone Hip Ext  20 B 20x B 20x B         Ther Activity                                       Gait Training                                       Modalities

## 2021-07-19 ENCOUNTER — OFFICE VISIT (OUTPATIENT)
Dept: PHYSICAL THERAPY | Facility: MEDICAL CENTER | Age: 61
End: 2021-07-19
Payer: COMMERCIAL

## 2021-07-19 DIAGNOSIS — M54.42 ACUTE RIGHT-SIDED LOW BACK PAIN WITH BILATERAL SCIATICA: Primary | ICD-10-CM

## 2021-07-19 DIAGNOSIS — M54.41 ACUTE RIGHT-SIDED LOW BACK PAIN WITH BILATERAL SCIATICA: Primary | ICD-10-CM

## 2021-07-19 PROCEDURE — 97110 THERAPEUTIC EXERCISES: CPT | Performed by: PHYSICAL THERAPIST

## 2021-07-19 PROCEDURE — 97140 MANUAL THERAPY 1/> REGIONS: CPT | Performed by: PHYSICAL THERAPIST

## 2021-07-19 NOTE — PROGRESS NOTES
Daily Note     Today's date: 2021  Patient name: Logan Looney  : 1960  MRN: [id number]  Referring provider: Cristi Cowan, PT  Dx:   Encounter Diagnosis     ICD-10-CM    1  Acute right-sided low back pain with bilateral sciatica  M54 42     M54 41                   Subjective: Pt feels nearly 70% improvement overall, notes being very conscious of posture  Back still feels "weak" at times  Objective: See treatment diary below      Assessment: Tolerated treatment well  Patient would benefit from continued PT      Plan: Continue per plan of care        Precautions: (-)      Manuals 7/1 7/8 7/12 7/15 7/19        Prone Lumbar PA mobs Gr 2-4  8' 8' increased stiffness 8' 8'                                               Neuro Re-Ed             Pt ed: Lx Roll  C  C C        Avoidance of BLT  C  C         Back account             Qped Alt UE/LE     :03 20        Abd Paty  :05 20 :05 20 :05 20 05 20        Mult TB Rot   G :03 10 B G :03 20 Blue :03 20        Bridges  :03 20 :05 02 :03 20 :03 20        Planks: Knees     :10/10        Ther Ex             HEP 10'            EIL  20 20 20 20        EIL pt OP  15 10 10 10        Sust Est             EIS  30 30 30 30        EIS pt OP  30 30 30 30        Standing Hip Ext  20 B 20x B 20x B 20x B        Prone Hip Ext  20 B 20x B 20x B 20x B        Ther Activity                                       Gait Training                                       Modalities

## 2021-07-21 ENCOUNTER — TELEPHONE (OUTPATIENT)
Dept: FAMILY MEDICINE CLINIC | Facility: CLINIC | Age: 61
End: 2021-07-21

## 2021-07-22 ENCOUNTER — APPOINTMENT (OUTPATIENT)
Dept: PHYSICAL THERAPY | Facility: MEDICAL CENTER | Age: 61
End: 2021-07-22
Payer: COMMERCIAL

## 2021-07-24 ENCOUNTER — APPOINTMENT (OUTPATIENT)
Dept: LAB | Facility: MEDICAL CENTER | Age: 61
End: 2021-07-24

## 2021-07-24 DIAGNOSIS — Z00.8 HEALTH EXAMINATION IN POPULATION SURVEYS: ICD-10-CM

## 2021-07-24 LAB
CHOLEST SERPL-MCNC: 305 MG/DL (ref 50–200)
EST. AVERAGE GLUCOSE BLD GHB EST-MCNC: 105 MG/DL
HBA1C MFR BLD: 5.3 %
HDLC SERPL-MCNC: 114 MG/DL
LDLC SERPL CALC-MCNC: 181 MG/DL (ref 0–100)
NONHDLC SERPL-MCNC: 191 MG/DL
TRIGL SERPL-MCNC: 48 MG/DL

## 2021-07-24 PROCEDURE — 80061 LIPID PANEL: CPT

## 2021-07-24 PROCEDURE — 83036 HEMOGLOBIN GLYCOSYLATED A1C: CPT

## 2021-07-24 PROCEDURE — 36415 COLL VENOUS BLD VENIPUNCTURE: CPT

## 2021-07-26 ENCOUNTER — OFFICE VISIT (OUTPATIENT)
Dept: PHYSICAL THERAPY | Facility: MEDICAL CENTER | Age: 61
End: 2021-07-26
Payer: COMMERCIAL

## 2021-07-26 DIAGNOSIS — M54.42 ACUTE RIGHT-SIDED LOW BACK PAIN WITH BILATERAL SCIATICA: Primary | ICD-10-CM

## 2021-07-26 DIAGNOSIS — M54.41 ACUTE RIGHT-SIDED LOW BACK PAIN WITH BILATERAL SCIATICA: Primary | ICD-10-CM

## 2021-07-26 PROCEDURE — 97140 MANUAL THERAPY 1/> REGIONS: CPT | Performed by: PHYSICAL THERAPIST

## 2021-07-26 PROCEDURE — 97110 THERAPEUTIC EXERCISES: CPT | Performed by: PHYSICAL THERAPIST

## 2021-07-26 NOTE — PROGRESS NOTES
Daily Note     Today's date: 2021  Patient name: Shaka Soria  : 1960  MRN: [id number]  Referring provider: Varsha Batista, PT  Dx:   Encounter Diagnosis     ICD-10-CM    1  Acute right-sided low back pain with bilateral sciatica  M54 42     M54 41                   Subjective: Pt feels ~ 90% overall improvement       Objective: See treatment diary below      Assessment: Tolerated treatment well   Patient would benefit from continued PT      Plan: RE NV for possible DC     Precautions: (-)      Manuals 7/1 7/8 7/12 7/15 7/19 7/26       Prone Lumbar PA mobs Gr 2-4  8' 8' increased stiffness 8' 8' 8'                                              Neuro Re-Ed             Pt ed: Lx Roll  C  C C        Avoidance of BLT  C  C         Back account             Qped Alt UE/LE     :03 20 :03 20       Abd Paty  :05 20 :05 20 :05 20 05 20 :05 20       Mult TB Rot   G :03 10 B G :03 20 Blue :03 20 Bue :03 20       Bridges  :03 20 :05 02 :03 20 :03 20 :03 20       Planks: Knees     :10/10 :10/10       Ther Ex             HEP 10'            EIL  20 20 20 20 20       EIL pt OP  15 10 10 10 10       Sust Est             EIS  30 30 30 30 30       EIS pt OP  30 30 30 30 30       Standing Hip Ext  20 B 20x B 20x B 20x B 20x B       Prone Hip Ext  20 B 20x B 20x B 20x B 20x B       Ther Activity                                       Gait Training                                       Modalities

## 2021-07-28 ENCOUNTER — CLINICAL SUPPORT (OUTPATIENT)
Dept: FAMILY MEDICINE CLINIC | Facility: CLINIC | Age: 61
End: 2021-07-28
Payer: COMMERCIAL

## 2021-07-28 DIAGNOSIS — F32.A DEPRESSION, UNSPECIFIED DEPRESSION TYPE: ICD-10-CM

## 2021-07-28 DIAGNOSIS — M81.8 OTHER OSTEOPOROSIS WITHOUT CURRENT PATHOLOGICAL FRACTURE: ICD-10-CM

## 2021-07-28 PROCEDURE — 96372 THER/PROPH/DIAG INJ SC/IM: CPT | Performed by: FAMILY MEDICINE

## 2021-07-28 RX ORDER — SERTRALINE HYDROCHLORIDE 100 MG/1
TABLET, FILM COATED ORAL
Qty: 90 TABLET | Refills: 0 | Status: SHIPPED | OUTPATIENT
Start: 2021-07-28 | End: 2021-12-07

## 2021-07-29 ENCOUNTER — OFFICE VISIT (OUTPATIENT)
Dept: PHYSICAL THERAPY | Facility: MEDICAL CENTER | Age: 61
End: 2021-07-29
Payer: COMMERCIAL

## 2021-07-29 DIAGNOSIS — M54.42 ACUTE RIGHT-SIDED LOW BACK PAIN WITH BILATERAL SCIATICA: Primary | ICD-10-CM

## 2021-07-29 DIAGNOSIS — M54.41 ACUTE RIGHT-SIDED LOW BACK PAIN WITH BILATERAL SCIATICA: Primary | ICD-10-CM

## 2021-07-29 PROCEDURE — 97140 MANUAL THERAPY 1/> REGIONS: CPT | Performed by: PHYSICAL THERAPIST

## 2021-07-29 PROCEDURE — 97110 THERAPEUTIC EXERCISES: CPT | Performed by: PHYSICAL THERAPIST

## 2021-07-29 PROCEDURE — 97112 NEUROMUSCULAR REEDUCATION: CPT | Performed by: PHYSICAL THERAPIST

## 2021-07-29 NOTE — PROGRESS NOTES
PT ReEvaluation and Discharge    Today's date: 2021  Patient name: Oz Concepcion  : 1960  MRN: [id number]  Referring provider: Lissa Church, PT  Dx:   Encounter Diagnosis     ICD-10-CM    1  Acute right-sided low back pain with bilateral sciatica  M54 42     M54 41                   Assessment  Assessment details: Patient has improved, has little pain and no significant deficits remaining  She is ready to transition to HEP  Her HEP and self management program was reviewed  She will only return to therapy prn  Impairments: pain with function  Understanding of Dx/Px/POC: good   Prognosis: good    Goals  ST-6 weeks  1  Patient to be independent with HEP - Met  2  Decrease pain at least 2 subjective levels  - Met    LT-12 weeks  1    Patient to voice comfort with self management of condition - Met  2   75% or > decreased pain  - Met  3   75% or > decreased functional deficits  - Met  4  Normalize AROM of all deficit planes  - Met  7  Patient to voice understanding of activities/positions to avoid  - Met      Plan  Patient would benefit from: skilled PT  Referral necessary: No  Planned modality interventions: cryotherapy  Planned therapy interventions: IADL retraining, joint mobilization, manual therapy, motor coordination training, neuromuscular re-education, patient education, postural training, self care, strengthening, stretching, therapeutic activities, therapeutic exercise, home exercise program, flexibility, ADL training, balance and body mechanics training  Treatment plan discussed with: patient        Subjective Evaluation    History of Present Illness  Onset date: 2 weeks ago  Mechanism of injury: Patient feels > 90% improvement since beginning therapy  She is independent with HEP, pleased with progress and feels ready to DC to HEP  She has no significant functional deficits remaining        Quality of life: good    Pain  At best pain ratin  At worst pain rating: 3  Location: R LBP          Objective     Postural Observations  Seated posture: fair  Standing posture: fair  Correction of posture: makes symptoms worse        Active Range of Motion     Lumbar   Flexion:  WFL  Extension:  WFL    General Comments:      Lumbar Comments  Slump: (-)  Neuromotor Screen: (-)    SLR 70 B, WFL  XSLR: (-)    Hip Screen: (-)    [employer] (-)    Graphical documentation             Precautions: (-)         Manuals 7/1 7/8 7/12 7/15 7/19 7/26 7/29      Prone Lumbar PA mobs Gr 2-4  8' 8' increased stiffness 8' 8' 8' 8'                                             Neuro Re-Ed             Pt ed: Lx Roll  C  C C        Avoidance of BLT  C  C         Back account             Qped Alt UE/LE     :03 20 :03 20 :03 20      Abd Paty  :05 20 :05 20 :05 20 05 20 :05 20 :05 20      Mult TB Rot   G :03 10 B G :03 20 Blue :03 20 Bue :03 20 Blue :03 20      Bridges  :03 20 :05 02 :03 20 :03 20 :03 20 :03 20      Planks: Knees     :10/10 :10/10 :10/10      Ther Ex             HEP 10'            EIL  20 20 20 20 20 20      EIL pt OP  15 10 10 10 10 10      Sust Est             EIS  30 30 30 30 30 30      EIS pt OP  30 30 30 30 30 30      Standing Hip Ext  20 B 20x B 20x B 20x B 20x B 20x B      Prone Hip Ext  20 B 20x B 20x B 20x B 20x B 20x B      Ther Activity                                       Gait Training                                       Modalities

## 2021-08-06 ENCOUNTER — OFFICE VISIT (OUTPATIENT)
Dept: URGENT CARE | Facility: MEDICAL CENTER | Age: 61
End: 2021-08-06
Payer: COMMERCIAL

## 2021-08-06 VITALS
HEIGHT: 63 IN | HEART RATE: 70 BPM | DIASTOLIC BLOOD PRESSURE: 72 MMHG | TEMPERATURE: 98.6 F | SYSTOLIC BLOOD PRESSURE: 155 MMHG | WEIGHT: 142 LBS | RESPIRATION RATE: 18 BRPM | BODY MASS INDEX: 25.16 KG/M2 | OXYGEN SATURATION: 98 %

## 2021-08-06 DIAGNOSIS — I83.899 RUPTURED VARICOSE VEIN: Primary | ICD-10-CM

## 2021-08-06 PROCEDURE — G0382 LEV 3 HOSP TYPE B ED VISIT: HCPCS | Performed by: PHYSICIAN ASSISTANT

## 2021-08-07 NOTE — PROGRESS NOTES
[facility]        NAME: Louann Torres is a 61 y o  female  : 1960    MRN: [id number]  DATE: 2021  TIME: 9:29 PM    Assessment and Plan   Ruptured varicose vein [I83 899]  1  Ruptured varicose vein         Advised patient this appears to be a ruptured varicose vein  Recommended Tylenol or Motrin for pain and warm compresses  Patient Instructions     Tylenol or Motrin for pain   Warm compresses  Follow up with PCP in 3-5 days  Proceed to  ER if symptoms worsen  Chief Complaint     Chief Complaint   Patient presents with    lump     to right shin          History of Present Illness        Patient is a 41-year-old female who presents today with lump on her right shin that first appeared tonight  She denies any injury  Is not on any blood thinners but does have varicose veins  Reports localized pain  No calf pain or swelling  States the swelling has gone down since she first noticed the area tonight  Review of Systems   Review of Systems   Constitutional: Negative for fever  Respiratory: Negative for shortness of breath  Cardiovascular: Negative for chest pain and leg swelling  Skin: Positive for color change          Lump on shin         Current Medications       Current Outpatient Medications:     levothyroxine 125 mcg tablet, TAKE ONE TABLET BY MOUTH EVERY DAY`, Disp: 90 tablet, Rfl: 0    sertraline (ZOLOFT) 100 mg tablet, TAKE ONE TABLET BY MOUTH EVERY DAY, Disp: 90 tablet, Rfl: 0    gabapentin (NEURONTIN) 300 mg capsule, Take 1 capsule (300 mg total) by mouth daily at bedtime, Disp: 30 capsule, Rfl: 1    methocarbamol (ROBAXIN) 500 mg tablet, Take 1 tablet (500 mg total) by mouth 3 (three) times a day for 10 days, Disp: 30 tablet, Rfl: 1    naproxen (NAPROSYN) 500 mg tablet, Take 1 tablet (500 mg total) by mouth 2 (two) times a day with meals, Disp: 60 tablet, Rfl: 0    Current Allergies     Allergies as of 2021    (No Known Allergies)            The following portions of the patient's history were reviewed and updated as appropriate: allergies, current medications, past family history, past medical history, past social history, past surgical history and problem list      Past Medical History:   Diagnosis Date    Cataract, right     Detached retina, right     Disease of thyroid gland     OCD (obsessive compulsive disorder)     Seasonal allergies     Wrist fracture        Past Surgical History:   Procedure Laterality Date    CATARACT EXTRACTION      EYE SURGERY      FOOT SURGERY      MANDIBLE FRACTURE SURGERY      TONSILLECTOMY         Family History   Problem Relation Age of Onset    Heart disease Mother         cardiac    Colon cancer Father 72    Heart disease Father     No Known Problems Sister     No Known Problems Daughter     No Known Problems Maternal Grandmother     No Known Problems Maternal Grandfather     No Known Problems Paternal Grandmother     Prostate cancer Paternal Grandfather     No Known Problems Sister     No Known Problems Daughter     No Known Problems Maternal Aunt     No Known Problems Paternal Aunt     No Known Problems Paternal Aunt          Medications have been verified  Objective   /72   Pulse 70   Temp 98 6 °F (37 °C)   Resp 18   Ht 5' 3" (1 6 m)   Wt 64 4 kg (142 lb)   SpO2 98%   BMI 25 15 kg/m²        Physical Exam     Physical Exam  Constitutional:       General: She is not in acute distress  Appearance: Normal appearance  She is not ill-appearing  Cardiovascular:      Rate and Rhythm: Normal rate and regular rhythm  Pulmonary:      Effort: Pulmonary effort is normal    Musculoskeletal:      Right lower leg: No edema  Left lower leg: No edema  Skin:     General: Skin is warm and dry  Findings: Bruising present  Neurological:      Mental Status: She is alert

## 2021-08-13 ENCOUNTER — NURSE TRIAGE (OUTPATIENT)
Dept: OTHER | Facility: OTHER | Age: 61
End: 2021-08-13

## 2021-08-13 ENCOUNTER — HOSPITAL ENCOUNTER (EMERGENCY)
Facility: HOSPITAL | Age: 61
Discharge: HOME | End: 2021-08-13
Attending: EMERGENCY MEDICINE
Payer: COMMERCIAL

## 2021-08-13 VITALS
TEMPERATURE: 98.9 F | RESPIRATION RATE: 18 BRPM | WEIGHT: 142 LBS | HEART RATE: 96 BPM | SYSTOLIC BLOOD PRESSURE: 140 MMHG | HEIGHT: 63 IN | BODY MASS INDEX: 25.16 KG/M2 | OXYGEN SATURATION: 96 % | DIASTOLIC BLOOD PRESSURE: 77 MMHG

## 2021-08-13 DIAGNOSIS — A08.4: Primary | ICD-10-CM

## 2021-08-13 PROCEDURE — U0003 INFECTIOUS AGENT DETECTION BY NUCLEIC ACID (DNA OR RNA); SEVERE ACUTE RESPIRATORY SYNDROME CORONAVIRUS 2 (SARS-COV-2) (CORONAVIRUS DISEASE [COVID-19]), AMPLIFIED PROBE TECHNIQUE, MAKING USE OF HIGH THROUGHPUT TECHNOLOGIES AS DESCRIBED BY CMS-2020-01-R: HCPCS | Performed by: EMERGENCY MEDICINE

## 2021-08-13 PROCEDURE — 87505 NFCT AGENT DETECTION GI: CPT | Performed by: EMERGENCY MEDICINE

## 2021-08-13 PROCEDURE — U0005 INFEC AGEN DETEC AMPLI PROBE: HCPCS | Performed by: EMERGENCY MEDICINE

## 2021-08-13 PROCEDURE — 87493 C DIFF AMPLIFIED PROBE: CPT | Performed by: EMERGENCY MEDICINE

## 2021-08-13 PROCEDURE — 99284 EMERGENCY DEPT VISIT MOD MDM: CPT

## 2021-08-13 PROCEDURE — 99282 EMERGENCY DEPT VISIT SF MDM: CPT | Performed by: EMERGENCY MEDICINE

## 2021-08-13 PROCEDURE — 87209 SMEAR COMPLEX STAIN: CPT | Performed by: EMERGENCY MEDICINE

## 2021-08-13 PROCEDURE — 87177 OVA AND PARASITES SMEARS: CPT | Performed by: EMERGENCY MEDICINE

## 2021-08-13 NOTE — TELEPHONE ENCOUNTER
Reason for Disposition   MILD-MODERATE diarrhea (e g , 1-6 times / day more than normal)    Answer Assessment - Initial Assessment Questions  1  DIARRHEA SEVERITY: "How bad is the diarrhea?" "How many extra stools have you had in the past 24 hours than normal?"     - NO DIARRHEA (SCALE 0)    - MILD (SCALE 1-3): Few loose or mushy BMs; increase of 1-3 stools over normal daily number of stools; mild increase in ostomy output  -  MODERATE (SCALE 4-7): Increase of 4-6 stools daily over normal; moderate increase in ostomy output  * SEVERE (SCALE 8-10; OR 'WORST POSSIBLE'): Increase of 7 or more stools daily over normal; moderate increase in ostomy output; incontinence  Mild diarrhea 3 in the last 24 hours  2  ONSET: "When did the diarrhea begin?"       8/12/21 afternoon  3  BM CONSISTENCY: "How loose or watery is the diarrhea?"       Watery stools  4  VOMITING: "Are you also vomiting?" If Yes, ask: "How many times in the past 24 hours?"       Denies   5  ABDOMINAL PAIN: "Are you having any abdominal pain?" If Yes, ask: "What does it feel like?" (e g , crampy, dull, intermittent, constant)       Dull pain  6  ABDOMINAL PAIN SEVERITY: If present, ask: "How bad is the pain?"  (e g , Scale 1-10; mild, moderate, or severe)    - MILD (1-3): doesn't interfere with normal activities, abdomen soft and not tender to touch     - MODERATE (4-7): interferes with normal activities or awakens from sleep, tender to touch     - SEVERE (8-10): excruciating pain, doubled over, unable to do any normal activities        3/10 mild pain  7  ORAL INTAKE: If vomiting, "Have you been able to drink liquids?" "How much fluids have you had in the past 24 hours?"      Denies emesis  8  HYDRATION: "Any signs of dehydration?" (e g , dry mouth [not just dry lips], too weak to stand, dizziness, new weight loss) "When did you last urinate?"      Denies  States urinated 1 hour  9   EXPOSURE: "Have you traveled to a foreign country recently?" "Have you been exposed to anyone with diarrhea?" "Could you have eaten any food that was spoiled?"     Exposed to daughter who was diagnosed with C-Diff  Denies spoiled food  10  ANTIBIOTIC USE: "Are you taking antibiotics now or have you taken antibiotics in the past 2 months?"        Denies  11  OTHER SYMPTOMS: "Do you have any other symptoms?" (e g , fever, blood in stool)        Achy, chills, fever (oral) 99 3      Protocols used: DIARRHEA-ADULT-AH

## 2021-08-13 NOTE — TELEPHONE ENCOUNTER
Regarding: poss c diff infection  ----- Message from Joelle Andujar sent at 8/13/2021  6:02 PM EDT -----  "My daughter was diagnosed with Cdiff and I would like to be tested as well because I am having the same symptoms as her "

## 2021-08-13 NOTE — TELEPHONE ENCOUNTER
Patient with c/o new onset 8/13/21 watery diarrhea (3/day), with fever (99 3, oral), dull intermittent abdominal pain rated 3/10, chills and body pain  Exposure to daughter diagnosed with C-Diff  Denies blood/pus/mucus in stool  Denies s&s of dehydration  TC to on-call provider, then called  Provider recommends proceeding to ED for evaluation  R/O Covid and C-Diff  Patient informed of providers recommendation  Verbalized understanding  Patient states will go tomorrow morning   Informed to call back if worsening symptoms

## 2021-08-14 LAB
C DIFF TOX B TCDB STL QL NAA+PROBE: NEGATIVE
CAMPYLOBACTER DNA SPEC NAA+PROBE: NORMAL
SALMONELLA DNA SPEC QL NAA+PROBE: NORMAL
SARS-COV-2 RNA RESP QL NAA+PROBE: NEGATIVE
SHIGA TOXIN STX GENE SPEC NAA+PROBE: NORMAL
SHIGELLA DNA SPEC QL NAA+PROBE: NORMAL

## 2021-08-14 NOTE — ED PROVIDER NOTES
History  Chief Complaint   Patient presents with    Diarrhea     pt presenting with diarrhea that started yesterday  She had 4 episodes last night and 4 episodes today  She has been around her daughter who had C-diff 4 weeks ago  Denies any urinary symtpoms, no recent antbx use  51-year-old female with past medical history of arthritis presents for evaluation of watery stools onset yesterday  Patient reports x4 episodes of diarrhea yesterday and x4 today without any evidence of blood in stool  Patient also reports associated Sx HA, mild generalized abdominal cramping, generalized body aches, as well as chills and  fever of 99 4 F taken yesterday and 103F earlier today for which she took Tylenol  Patient reports she only had toast to eat all day today and has tried to stay hydrated with water and Gatorade  Patient's daughter recently recovered from C diff and Campylobacter jejuni infection 2 weeks ago for which she was treated with antibiotics  Daughter reports she had similar flu-like symptoms at the beginning of her infectious course as well  Patient denies any COVID exposure or any other possible infectious exposures  Patient does not believe this is food-borne however did feel queasy after eating pizza at a [employer]  No other concerns  Prior to Admission Medications   Prescriptions Last Dose Informant Patient Reported?  Taking?   gabapentin (NEURONTIN) 300 mg capsule  Self No No   Sig: Take 1 capsule (300 mg total) by mouth daily at bedtime   levothyroxine 125 mcg tablet   No No   Sig: TAKE ONE TABLET BY MOUTH EVERY DAY`   methocarbamol (ROBAXIN) 500 mg tablet  Self No No   Sig: Take 1 tablet (500 mg total) by mouth 3 (three) times a day for 10 days   naproxen (NAPROSYN) 500 mg tablet  Self No No   Sig: Take 1 tablet (500 mg total) by mouth 2 (two) times a day with meals   sertraline (ZOLOFT) 100 mg tablet   No No   Sig: TAKE ONE TABLET BY MOUTH EVERY DAY      Facility-Administered Medications: None       Past Medical History:   Diagnosis Date    Cataract, right     Detached retina, right     Disease of thyroid gland     OCD (obsessive compulsive disorder)     Seasonal allergies     Wrist fracture        Past Surgical History:   Procedure Laterality Date    CATARACT EXTRACTION      EYE SURGERY      FOOT SURGERY      MANDIBLE FRACTURE SURGERY      TONSILLECTOMY         Family History   Problem Relation Age of Onset    Heart disease Mother         cardiac    Colon cancer Father 72    Heart disease Father     No Known Problems Sister     No Known Problems Daughter     No Known Problems Maternal Grandmother     No Known Problems Maternal Grandfather     No Known Problems Paternal Grandmother     Prostate cancer Paternal Grandfather     No Known Problems Sister     No Known Problems Daughter     No Known Problems Maternal Aunt     No Known Problems Paternal Aunt     No Known Problems Paternal Aunt      I have reviewed and agree with the history as documented  E-Cigarette/Vaping    E-Cigarette Use Never User      E-Cigarette/Vaping Substances    Nicotine No     THC No     CBD No     Flavoring No     Other No     Unknown No      Social History     Tobacco Use    Smoking status: Never Smoker    Smokeless tobacco: Never Used   Vaping Use    Vaping Use: Never used   Substance Use Topics    Alcohol use: Yes    Drug use: Never        Review of Systems   Constitutional: Positive for chills and fatigue  HENT: Negative  Eyes: Negative  Respiratory: Negative  Negative for cough and shortness of breath  Cardiovascular: Negative  Gastrointestinal: Positive for abdominal pain and diarrhea  Negative for blood in stool, nausea and vomiting  Endocrine: Negative  Genitourinary: Negative  Negative for dysuria  Musculoskeletal: Negative  Allergic/Immunologic: Negative  Neurological: Positive for headaches  Hematological: Negative  Psychiatric/Behavioral: Negative  All other systems reviewed and are negative  Physical Exam  ED Triage Vitals [08/13/21 2110]   Temperature Pulse Respirations Blood Pressure SpO2   98 9 °F (37 2 °C) 96 18 140/77 96 %      Temp Source Heart Rate Source Patient Position - Orthostatic VS BP Location FiO2 (%)   Oral -- Sitting Right arm --      Pain Score       --             Orthostatic Vital Signs  Vitals:    08/13/21 2110   BP: 140/77   Pulse: 96   Patient Position - Orthostatic VS: Sitting       Physical Exam  Vitals and nursing note reviewed  Constitutional:       Appearance: Normal appearance  HENT:      Head: Normocephalic and atraumatic  Mouth/Throat:      Mouth: Mucous membranes are moist    Eyes:      Extraocular Movements: Extraocular movements intact  Cardiovascular:      Rate and Rhythm: Normal rate and regular rhythm  Pulses: Normal pulses  Heart sounds: Normal heart sounds  Pulmonary:      Effort: Pulmonary effort is normal  No respiratory distress  Breath sounds: Normal breath sounds  No wheezing  Abdominal:      General: Abdomen is flat  Bowel sounds are normal       Palpations: Abdomen is soft  There is no mass  Tenderness: There is no abdominal tenderness  There is no guarding or rebound  Musculoskeletal:         General: Normal range of motion  Cervical back: Normal range of motion  Skin:     General: Skin is warm and dry  Capillary Refill: Capillary refill takes less than 2 seconds  Neurological:      Mental Status: She is alert and oriented to person, place, and time     Psychiatric:         Mood and Affect: Mood normal          Behavior: Behavior normal          ED Medications  Medications - No data to display    Diagnostic Studies  Results Reviewed     Procedure Component Value Units Date/Time    Novel Coronavirus (Covid-19),PCR [facility] - 2 Hour Stat [[id number]]     Lab Status: No result Specimen: Nares from Nose     Stool Enteric Bacterial Panel by PCR [[id number]]     Lab Status: No result Specimen: Stool from Per Rectum     Ova and parasite examination [[id number]]     Lab Status: No result Specimen: Stool from Rectum     Clostridium difficile toxin by PCR with EIA [[id number]]     Lab Status: No result Specimen: Stool from Per Rectum                  No orders to display         Procedures  Procedures      ED Course                             SBIRT 22yo+      Most Recent Value   SBIRT (24 yo +)   In order to provide better care to our patients, we are screening all of our patients for alcohol and drug use  Would it be okay to ask you these screening questions? Yes Filed at: 08/13/2021 2129   Initial Alcohol Screen: US AUDIT-C    1  How often do you have a drink containing alcohol?  0 Filed at: 08/13/2021 2129   2  How many drinks containing alcohol do you have on a typical day you are drinking? 0 Filed at: 08/13/2021 2129   3b  FEMALE Any Age, or MALE 65+: How often do you have 4 or more drinks on one occassion? 0 Filed at: 08/13/2021 2129   Audit-C Score  0 Filed at: 08/13/2021 2129   JERRY: How many times in the past year have you    Used an illegal drug or used a prescription medication for non-medical reasons? Never Filed at: 08/13/2021 2129                MDM  Number of Diagnoses or Management Options  Viral enterocolitis  Diagnosis management comments: 80-year-old female with past medical history of arthritis presents for evaluation of watery stools onset yesterday  Patient reports x4 episodes of diarrhea yesterday and x4 today without any evidence of blood in stool  Patient appears well with moist mucous membranes  Discussed the plans for obtaining stool culture, C diff PCR, stool/ova parasite stool exam as well as a COVID swab in ED  Plans to contact pt for any positive labs and appropriate management at this time  Pt is amenable to plan to d/c to home with symptomatic management and PO hydration   Pt was offered IV fluids which she did not feel was necessary  All questions answered  No other concerns  Risk of Complications, Morbidity, and/or Mortality  Presenting problems: low  Diagnostic procedures: low  Management options: low    Patient Progress  Patient progress: stable      Disposition  Final diagnoses:   Viral enterocolitis     Time reflects when diagnosis was documented in both MDM as applicable and the Disposition within this note     Time User Action Codes Description Comment    8/13/2021 10:27 PM Sierra Hendrickson Add [A08 4] Viral enterocolitis       ED Disposition     ED Disposition Condition Date/Time Comment    Discharge Stable Fri Aug 13, 2021 10:27 PM Bonnie Charles discharge to home/self care  Follow-up Information     Follow up With Specialties Details Why Contact Info Additional [facility], DO Family Medicine  As needed for follow up and further management of symptoms  [id number] [address] Emergency Department Emergency Medicine Go to  Please return to ED for worsening symptoms such as worsening abdominal pain, bloody diarrhea, spiking fevers, inability to hydrate appropriately  [address] Emergency Department,  [address], [place], [address]          Patient's Medications   Discharge Prescriptions    No medications on file     No discharge procedures on file  PDMP Review       Value Time User    PDMP Reviewed  Yes 12/10/2020  4:24 PM Shira Back DO           ED Provider  Attending physically available and evaluated Bonnie Charles  I managed the patient along with the ED Attending      Electronically Signed by         Heidy Matos MD  08/13/21 9843

## 2021-08-14 NOTE — ED ATTENDING ATTESTATION
8/13/2021  ILinda DO, saw and evaluated the patient  I have discussed the patient with the resident/non-physician practitioner and agree with the resident's/non-physician practitioner's findings, Plan of Care, and MDM as documented in the resident's/non-physician practitioner's note, except where noted  All available labs and Radiology studies were reviewed  I was present for key portions of any procedure(s) performed by the resident/non-physician practitioner and I was immediately available to provide assistance  At this point I agree with the current assessment done in the Emergency Department  I have conducted an independent evaluation of this patient a history and physical is as follows:          79-year-old female presents with diarrhea  Four episodes over the past 2 days  , yesterday had a fever, was 103, no fever today, doubt time of full body aches but this is resolved  Daughter just had C diff  No trauma no injury  , no abdominal pain  Review of Systems   Constitutional: Negative for activity change, chills, diaphoresis and fever  HENT: Negative for congestion, sinus pressure and sore throat  Eyes: Negative for pain and visual disturbance  Respiratory: Negative for cough, chest tightness, shortness of breath, wheezing and stridor  Cardiovascular: Negative for chest pain and palpitations  Gastrointestinal: Negative for abdominal distention, abdominal pain, constipation, positive for diarrhea, negative for nausea and vomiting  Genitourinary: Negative for dysuria and frequency  Musculoskeletal: Negative for neck pain and neck stiffness  Skin: Negative for rash  Neurological: Negative for dizziness, speech difficulty, light-headedness, numbness and headaches  Physical Exam  Vitals reviewed  Constitutional:       General: She is not in acute distress  Appearance: She is well-developed  She is not diaphoretic  HENT:      Head: Normocephalic and atraumatic  Right Ear: External ear normal       Left Ear: External ear normal       Nose: Nose normal    Eyes:      General:         Right eye: No discharge  Left eye: No discharge  Pupils: Pupils are equal, round, and reactive to light  Neck:      Trachea: No tracheal deviation  Cardiovascular:      Rate and Rhythm: Normal rate and regular rhythm  Heart sounds: Normal heart sounds  No murmur heard  Pulmonary:      Effort: Pulmonary effort is normal  No respiratory distress  Breath sounds: Normal breath sounds  No stridor  Abdominal:      General: There is no distension  Palpations: Abdomen is soft  Tenderness: There is no abdominal tenderness  There is no guarding or rebound  Musculoskeletal:         General: Normal range of motion  Cervical back: Normal range of motion and neck supple  Skin:     General: Skin is warm and dry  Coloration: Skin is not pale  Findings: No erythema  Neurological:      General: No focal deficit present  Mental Status: She is alert and oriented to person, place, and time  ED Course         Critical Care Time  Procedures    MDM  Number of Diagnoses or Management Options  Diagnosis management comments: 10year-old female, nonbloody watery diarrhea, 4 episodes so was over the past 2 days, concern for COVID concern for C diff his daughter just had C diff normal vital signs here discussed workup including IV placement, blood work, IV fluids, patient risk respectfully declining  Will discharge        Time reflects when diagnosis was documented in both MDM as applicable and the Disposition within this note     Time User Action Codes Description Comment    8/13/2021 10:27 PM Nruy Standard Add [A08 4] Viral enterocolitis       ED Disposition     ED Disposition Condition Date/Time Comment    Discharge Stable Fri Aug 13, 2021 10:27 PM Glory Sarmiento discharge to home/self care              Follow-up Information Follow up With Specialties Details Why Contact Info Additional [address] [facility], DO Family Medicine  As needed for follow up and further management of symptoms  [address] [facility]  [address] [facility] Emergency Department Emergency Medicine Go to  Please return to ED for worsening symptoms such as worsening abdominal pain, bloody diarrhea, spiking fevers, inability to hydrate appropriately   [address] Emergency Department, [address], [facility] [place], [address] [facility], [address]

## 2021-08-16 ENCOUNTER — TELEPHONE (OUTPATIENT)
Dept: FAMILY MEDICINE CLINIC | Facility: CLINIC | Age: 61
End: 2021-08-16

## 2021-08-17 ENCOUNTER — TELEMEDICINE (OUTPATIENT)
Dept: FAMILY MEDICINE CLINIC | Facility: CLINIC | Age: 61
End: 2021-08-17
Payer: COMMERCIAL

## 2021-08-17 DIAGNOSIS — R19.7 DIARRHEA, UNSPECIFIED TYPE: Primary | ICD-10-CM

## 2021-08-17 PROCEDURE — 99213 OFFICE O/P EST LOW 20 MIN: CPT | Performed by: FAMILY MEDICINE

## 2021-08-17 RX ORDER — CHOLESTYRAMINE 4 G/9G
1 POWDER, FOR SUSPENSION ORAL 2 TIMES DAILY WITH MEALS
Qty: 20 PACKET | Refills: 0 | Status: SHIPPED | OUTPATIENT
Start: 2021-08-17 | End: 2022-02-10 | Stop reason: ALTCHOICE

## 2021-08-18 NOTE — PROGRESS NOTES
Virtual Regular Visit    Verification of patient location:    Patient is located in the following state in which I hold an active license [place]      Assessment/Plan:    Problem List Items Addressed This Visit     None      Visit Diagnoses     Diarrhea, unspecified type    -  Primary    Relevant Medications    cholestyramine (QUESTRAN) 4 g packet               Reason for visit is No chief complaint on file  Encounter provider Steve Chacon DO    Provider located at [address] [place] [id number]      Recent Visits  Date Type Provider Dept   08/16/21 Telephone Dolores Wong, [address] [place] [address] recent visits within past 7 days and meeting all other requirements  Future Appointments  No visits were found meeting these conditions  Showing future appointments within next 150 days and meeting all other requirements       The patient was identified by name and date of birth  Moncho Whittington was informed that this is a telemedicine visit and that the visit is being conducted through [address] [facility] and patient was informed that this is a secure, HIPAA-compliant platform  She agrees to proceed     My office door was closed  No one else was in the room  She acknowledged consent and understanding of privacy and security of the video platform  The patient has agreed to participate and understands they can discontinue the visit at any time  Patient is aware this is a billable service  Subjective  Moncho Whittington is a 61 y o  female who presented to ER with persistant diarrhea>4 days  Her daughter had c difficile, but pt was tested and found to be negative for infectious etiology  Her stools are still loose, but not all liquid          HPI     Past Medical History:   Diagnosis Date    Cataract, right     Detached retina, right     Disease of thyroid gland     OCD (obsessive compulsive disorder)     Seasonal allergies     Wrist fracture Past Surgical History:   Procedure Laterality Date    CATARACT EXTRACTION      EYE SURGERY      FOOT SURGERY      MANDIBLE FRACTURE SURGERY      TONSILLECTOMY         Current Outpatient Medications   Medication Sig Dispense Refill    cholestyramine (QUESTRAN) 4 g packet Take 1 packet (4 g total) by mouth 2 (two) times a day with meals 20 packet 0    gabapentin (NEURONTIN) 300 mg capsule Take 1 capsule (300 mg total) by mouth daily at bedtime 30 capsule 1    levothyroxine 125 mcg tablet TAKE ONE TABLET BY MOUTH EVERY DAY` 90 tablet 0    methocarbamol (ROBAXIN) 500 mg tablet Take 1 tablet (500 mg total) by mouth 3 (three) times a day for 10 days 30 tablet 1    naproxen (NAPROSYN) 500 mg tablet Take 1 tablet (500 mg total) by mouth 2 (two) times a day with meals 60 tablet 0    sertraline (ZOLOFT) 100 mg tablet TAKE ONE TABLET BY MOUTH EVERY DAY 90 tablet 0     No current facility-administered medications for this visit  No Known Allergies    Review of Systems   Constitutional: Negative for fatigue and fever  HENT: Negative for congestion  Respiratory: Negative for cough  Gastrointestinal: Positive for diarrhea  Negative for nausea and vomiting  Musculoskeletal: Negative for arthralgias and myalgias  Neurological: Negative for headaches  All other systems reviewed and are negative  Video Exam    There were no vitals filed for this visit  Physical Exam  Vitals reviewed  Constitutional:       Appearance: Normal appearance  She is not ill-appearing  Eyes:      General:         Right eye: No discharge  Left eye: No discharge  Pulmonary:      Effort: No respiratory distress  Neurological:      General: No focal deficit present  Mental Status: She is alert and oriented to person, place, and time  Psychiatric:         Mood and Affect: Mood normal          Behavior: Behavior normal          Thought Content:  Thought content normal          Judgment: Judgment normal           I spent 15 minutes directly with the patient during this visit    VIRTUAL VISIT DISCLAIMER      Charles Chaudhari verbally agrees to participate in [employer]  Pt is aware that [employer] could be limited without vital signs or the ability to perform a full hands-on physical exam  Lisa Ardon understands she or the provider may request at any time to terminate the video visit and request the patient to seek care or treatment in person

## 2021-08-19 LAB — O+P STL CONC: NORMAL

## 2021-09-13 ENCOUNTER — TELEPHONE (OUTPATIENT)
Dept: PSYCHIATRY | Facility: CLINIC | Age: 61
End: 2021-09-13

## 2021-11-22 DIAGNOSIS — Z12.11 COLON CANCER SCREENING: Primary | ICD-10-CM

## 2021-12-07 DIAGNOSIS — E03.9 HYPOTHYROIDISM, UNSPECIFIED TYPE: ICD-10-CM

## 2021-12-07 DIAGNOSIS — F32.A DEPRESSION, UNSPECIFIED DEPRESSION TYPE: ICD-10-CM

## 2021-12-07 RX ORDER — SERTRALINE HYDROCHLORIDE 100 MG/1
TABLET, FILM COATED ORAL
Qty: 90 TABLET | Refills: 0 | Status: SHIPPED | OUTPATIENT
Start: 2021-12-07 | End: 2022-08-01

## 2021-12-07 RX ORDER — LEVOTHYROXINE SODIUM 0.12 MG/1
TABLET ORAL
Qty: 90 TABLET | Refills: 0 | Status: SHIPPED | OUTPATIENT
Start: 2021-12-07 | End: 2022-02-10 | Stop reason: ALTCHOICE

## 2021-12-08 DIAGNOSIS — Z12.11 COLON CANCER SCREENING: Primary | ICD-10-CM

## 2022-01-26 ENCOUNTER — APPOINTMENT (OUTPATIENT)
Dept: LAB | Facility: MEDICAL CENTER | Age: 62
End: 2022-01-26
Payer: COMMERCIAL

## 2022-01-26 ENCOUNTER — TELEPHONE (OUTPATIENT)
Dept: FAMILY MEDICINE CLINIC | Facility: CLINIC | Age: 62
End: 2022-01-26

## 2022-01-26 DIAGNOSIS — E03.9 HYPOTHYROIDISM, UNSPECIFIED TYPE: ICD-10-CM

## 2022-01-26 DIAGNOSIS — E03.9 HYPOTHYROIDISM, UNSPECIFIED TYPE: Primary | ICD-10-CM

## 2022-01-26 LAB
T4 FREE SERPL-MCNC: 0.7 NG/DL (ref 0.76–1.46)
TSH SERPL DL<=0.05 MIU/L-ACNC: 12.6 UIU/ML (ref 0.36–3.74)

## 2022-01-26 PROCEDURE — 84443 ASSAY THYROID STIM HORMONE: CPT

## 2022-01-26 PROCEDURE — 36415 COLL VENOUS BLD VENIPUNCTURE: CPT

## 2022-01-26 PROCEDURE — 84439 ASSAY OF FREE THYROXINE: CPT

## 2022-01-26 NOTE — TELEPHONE ENCOUNTER
----- Message from Sonali Velázquez sent at 1/26/2022  1:57 PM EST -----  Regarding: FW: Thyroid lab work    ----- Message -----  From: Conrad Ojeda  Sent: 1/26/2022   1:55 PM EST  To: [facility]  Subject: Thyroid lab work                                 sonal Armstrong thanks so much, and anytime after 4, if not I am off next Thursday, and the next Tuesday, lol depending on our crazy schedule, but even anything in the afternoon, I can prob get away earlier, thanks so much

## 2022-01-27 DIAGNOSIS — E03.9 HYPOTHYROIDISM, UNSPECIFIED TYPE: Primary | ICD-10-CM

## 2022-01-27 RX ORDER — LEVOTHYROXINE SODIUM 0.15 MG/1
150 TABLET ORAL
Qty: 30 TABLET | Refills: 5 | Status: SHIPPED | OUTPATIENT
Start: 2022-01-27 | End: 2022-05-05 | Stop reason: SDUPTHER

## 2022-02-10 ENCOUNTER — OFFICE VISIT (OUTPATIENT)
Dept: FAMILY MEDICINE CLINIC | Facility: CLINIC | Age: 62
End: 2022-02-10
Payer: COMMERCIAL

## 2022-02-10 VITALS
DIASTOLIC BLOOD PRESSURE: 72 MMHG | WEIGHT: 137.4 LBS | BODY MASS INDEX: 24.34 KG/M2 | HEIGHT: 63 IN | HEART RATE: 69 BPM | TEMPERATURE: 98.7 F | OXYGEN SATURATION: 98 % | SYSTOLIC BLOOD PRESSURE: 120 MMHG

## 2022-02-10 DIAGNOSIS — Z00.00 ANNUAL PHYSICAL EXAM: Primary | ICD-10-CM

## 2022-02-10 DIAGNOSIS — B00.9 HSV INFECTION: ICD-10-CM

## 2022-02-10 DIAGNOSIS — M81.8 OTHER OSTEOPOROSIS WITHOUT CURRENT PATHOLOGICAL FRACTURE: ICD-10-CM

## 2022-02-10 PROCEDURE — 96372 THER/PROPH/DIAG INJ SC/IM: CPT | Performed by: FAMILY MEDICINE

## 2022-02-10 PROCEDURE — 99396 PREV VISIT EST AGE 40-64: CPT | Performed by: FAMILY MEDICINE

## 2022-02-10 RX ORDER — VALACYCLOVIR HYDROCHLORIDE 1 G/1
1000 TABLET, FILM COATED ORAL 2 TIMES DAILY
Qty: 30 TABLET | Refills: 3 | Status: SHIPPED | OUTPATIENT
Start: 2022-02-10 | End: 2022-04-11

## 2022-02-10 NOTE — PROGRESS NOTES
[facility]    NAME: Trae Colmenares  AGE: 64 y o  SEX: female  : 1960     DATE: 2/10/2022     Assessment and Plan:     Problem List Items Addressed This Visit     None          Immunizations and preventive care screenings were discussed with patient today  Appropriate education was printed on patient's after visit summary  Counseling:  · Exercise: the importance of regular exercise/physical activity was discussed  Recommend exercise 3-5 times per week for at least 30 minutes  No follow-ups on file  Chief Complaint:     Chief Complaint   Patient presents with    Annual Exam      History of Present Illness:     Adult Annual Physical   Patient here for a comprehensive physical exam  The patient reports no problems  Diet and Physical Activity  · Diet/Nutrition: well balanced diet  · Exercise: walking  Depression Screening  PHQ-2/9 Depression Screening    Little interest or pleasure in doing things: 0 - not at all  Feeling down, depressed, or hopeless: 0 - not at all  PHQ-2 Score: 0  PHQ-2 Interpretation: Negative depression screen       General Health  · Sleep: sleeps well  · Hearing: normal - bilateral   · Vision: no vision problems  · Dental: regular dental visits  /GYN Health  · Patient is: postmenopausal  · Last menstrual period:   · Contraceptive method:       Review of Systems:     Review of Systems   Constitutional: Negative for appetite change, chills and fever  HENT: Negative for ear pain, facial swelling, rhinorrhea, sinus pain, sore throat and trouble swallowing  Eyes: Negative for discharge and redness  Respiratory: Negative for chest tightness, shortness of breath and wheezing  Cardiovascular: Negative for chest pain and palpitations  Gastrointestinal: Negative for abdominal pain, diarrhea, nausea and vomiting  Endocrine: Negative for polyuria     Genitourinary: Negative for dysuria and urgency  Musculoskeletal: Negative for arthralgias and back pain  Skin: Negative for rash  Neurological: Negative for dizziness, weakness and headaches  Hematological: Negative for adenopathy  Psychiatric/Behavioral: Negative for behavioral problems, confusion and sleep disturbance  All other systems reviewed and are negative  Past Medical History:     Past Medical History:   Diagnosis Date    Cataract, right     Detached retina, right     Disease of thyroid gland     OCD (obsessive compulsive disorder)     Seasonal allergies     Wrist fracture       Past Surgical History:     Past Surgical History:   Procedure Laterality Date    CATARACT EXTRACTION      EYE SURGERY      FOOT SURGERY      MANDIBLE FRACTURE SURGERY      TONSILLECTOMY        Social History:     Social History     Socioeconomic History    Marital status: /Civil Union     Spouse name: None    Number of children: None    Years of education: None    Highest education level: None   Occupational History    None   Tobacco Use    Smoking status: Never Smoker    Smokeless tobacco: Never Used   Vaping Use    Vaping Use: Never used   Substance and Sexual Activity    Alcohol use:  Yes    Drug use: Never    Sexual activity: None   Other Topics Concern    None   Social History Narrative    Daily coffee    Denied cola    Denied tea     Social Determinants of Health     Financial Resource Strain: Not on file   Food Insecurity: Not on file   Transportation Needs: Not on file   Physical Activity: Not on file   Stress: Not on file   Social Connections: Not on file   Intimate Partner Violence: Not on file   Housing Stability: Not on file      Family History:     Family History   Problem Relation Age of Onset    Heart disease Mother         cardiac    Colon cancer Father 72    Heart disease Father     No Known Problems Sister     No Known Problems Daughter     No Known Problems Maternal Grandmother     No Known Problems Maternal Grandfather     No Known Problems Paternal Grandmother     Prostate cancer Paternal Grandfather     No Known Problems Sister     No Known Problems Daughter     No Known Problems Maternal Aunt     No Known Problems Paternal Aunt     No Known Problems Paternal Aunt       Current Medications:     Current Outpatient Medications   Medication Sig Dispense Refill    levothyroxine (Synthroid) 150 mcg tablet Take 1 tablet (150 mcg total) by mouth daily in the early morning 30 tablet 5    sertraline (ZOLOFT) 100 mg tablet TAKE ONE TABLET BY MOUTH EVERY DAY 90 tablet 0     No current facility-administered medications for this visit  Allergies:     No Known Allergies   Physical Exam:     /72   Pulse 69   Temp 98 7 °F (37 1 °C)   Ht 5' 3" (1 6 m)   Wt 62 3 kg (137 lb 6 4 oz)   SpO2 98%   BMI 24 34 kg/m²       BMI Counseling: Body mass index is 24 34 kg/m²  The BMI is above normal  Nutrition recommendations include reducing portion sizes and moderation in carbohydrate intake  Physical Exam  Vitals and nursing note reviewed  Constitutional:       General: She is not in acute distress  Appearance: Normal appearance  She is not ill-appearing or diaphoretic  HENT:      Head: Normocephalic and atraumatic  Right Ear: Tympanic membrane, ear canal and external ear normal       Left Ear: Tympanic membrane, ear canal and external ear normal       Nose: Nose normal  No congestion or rhinorrhea  Mouth/Throat:      Mouth: Mucous membranes are moist       Pharynx: Oropharynx is clear  No posterior oropharyngeal erythema  Eyes:      General:         Right eye: No discharge  Left eye: No discharge  Extraocular Movements: Extraocular movements intact  Conjunctiva/sclera: Conjunctivae normal       Pupils: Pupils are equal, round, and reactive to light  Neck:      Vascular: No carotid bruit     Cardiovascular:      Rate and Rhythm: Normal rate and regular rhythm  Pulses: Normal pulses  Heart sounds: Normal heart sounds  No murmur heard  Pulmonary:      Effort: Pulmonary effort is normal  No respiratory distress  Breath sounds: Normal breath sounds  No wheezing or rhonchi  Abdominal:      General: Abdomen is flat  Bowel sounds are normal  There is no distension  Palpations: There is no mass  Tenderness: There is no abdominal tenderness  Musculoskeletal:         General: No swelling or deformity  Normal range of motion  Cervical back: Normal range of motion and neck supple  No rigidity  Right lower leg: No edema  Left lower leg: No edema  Lymphadenopathy:      Cervical: No cervical adenopathy  Skin:     General: Skin is warm and dry  Capillary Refill: Capillary refill takes less than 2 seconds  Coloration: Skin is not jaundiced  Findings: No bruising, erythema or rash  Neurological:      General: No focal deficit present  Mental Status: She is alert and oriented to person, place, and time  Cranial Nerves: No cranial nerve deficit  Sensory: No sensory deficit  Gait: Gait normal       Deep Tendon Reflexes: Reflexes normal    Psychiatric:         Mood and Affect: Mood normal          Behavior: Behavior normal          Thought Content:  Thought content normal          Judgment: Judgment normal           Samantha Stevenson, [id number] [address] [facility]

## 2022-02-10 NOTE — PATIENT INSTRUCTIONS

## 2022-03-08 DIAGNOSIS — M54.50 LUMBAR BACK PAIN: Primary | ICD-10-CM

## 2022-03-08 RX ORDER — PREDNISONE 10 MG/1
TABLET ORAL
Qty: 26 TABLET | Refills: 0 | Status: SHIPPED | OUTPATIENT
Start: 2022-03-08 | End: 2022-03-22

## 2022-03-08 RX ORDER — METHOCARBAMOL 500 MG/1
500 TABLET, FILM COATED ORAL
Qty: 30 TABLET | Refills: 0 | Status: SHIPPED | OUTPATIENT
Start: 2022-03-08 | End: 2022-03-22

## 2022-03-21 ENCOUNTER — OFFICE VISIT (OUTPATIENT)
Dept: URGENT CARE | Facility: MEDICAL CENTER | Age: 62
End: 2022-03-21
Payer: COMMERCIAL

## 2022-03-21 ENCOUNTER — APPOINTMENT (OUTPATIENT)
Dept: RADIOLOGY | Facility: MEDICAL CENTER | Age: 62
End: 2022-03-21
Payer: COMMERCIAL

## 2022-03-21 VITALS
TEMPERATURE: 97.5 F | HEART RATE: 80 BPM | RESPIRATION RATE: 18 BRPM | DIASTOLIC BLOOD PRESSURE: 68 MMHG | HEIGHT: 63 IN | SYSTOLIC BLOOD PRESSURE: 167 MMHG | OXYGEN SATURATION: 96 % | BODY MASS INDEX: 23.92 KG/M2 | WEIGHT: 135 LBS

## 2022-03-21 DIAGNOSIS — S82.891A CLOSED FRACTURE OF RIGHT ANKLE, INITIAL ENCOUNTER: Primary | ICD-10-CM

## 2022-03-21 DIAGNOSIS — S93.491A SPRAIN OF OTHER LIGAMENT OF RIGHT ANKLE, INITIAL ENCOUNTER: ICD-10-CM

## 2022-03-21 PROCEDURE — 29515 APPLICATION SHORT LEG SPLINT: CPT | Performed by: PHYSICIAN ASSISTANT

## 2022-03-21 PROCEDURE — S9083 URGENT CARE CENTER GLOBAL: HCPCS | Performed by: PHYSICIAN ASSISTANT

## 2022-03-21 PROCEDURE — G0382 LEV 3 HOSP TYPE B ED VISIT: HCPCS | Performed by: PHYSICIAN ASSISTANT

## 2022-03-21 PROCEDURE — 73610 X-RAY EXAM OF ANKLE: CPT

## 2022-03-22 ENCOUNTER — OFFICE VISIT (OUTPATIENT)
Dept: OBGYN CLINIC | Facility: CLINIC | Age: 62
End: 2022-03-22
Payer: COMMERCIAL

## 2022-03-22 VITALS
BODY MASS INDEX: 23.92 KG/M2 | WEIGHT: 135 LBS | HEART RATE: 62 BPM | DIASTOLIC BLOOD PRESSURE: 78 MMHG | HEIGHT: 63 IN | SYSTOLIC BLOOD PRESSURE: 146 MMHG

## 2022-03-22 DIAGNOSIS — M25.571 ACUTE RIGHT ANKLE PAIN: ICD-10-CM

## 2022-03-22 DIAGNOSIS — S82.831A CLOSED FRACTURE OF DISTAL END OF RIGHT FIBULA, UNSPECIFIED FRACTURE MORPHOLOGY, INITIAL ENCOUNTER: Primary | ICD-10-CM

## 2022-03-22 PROCEDURE — 99214 OFFICE O/P EST MOD 30 MIN: CPT | Performed by: PHYSICAL MEDICINE & REHABILITATION

## 2022-03-22 NOTE — LETTER
To Whom It May Concern,    Jose Antonio Chan is under my professional care  She was seen in my office on March 22, 2022  She is cleared to return to work with the following restrictions:    [employer] duty only  Please excuse Jose Antonio Chan from any work missed on this appointment date  If you have any questions or concerns, please do not hesitate to call          Sincerely,          Wes Trevino, DO

## 2022-03-22 NOTE — PATIENT INSTRUCTIONS
Ankle fracture   Splint in place   Nonweightbearing  Follow up with PCP in 3-5 days  Proceed to  ER if symptoms worsen  Ankle Fracture   WHAT YOU NEED TO KNOW:   An ankle fracture is a break in 1 or more of the bones in your ankle  DISCHARGE INSTRUCTIONS:   Call your local emergency number (911 in the [address]) for any of the following:   · You feel lightheaded, short of breath, and have chest pain  · You cough up blood  Return to the emergency department if:   · Your leg feels warm, tender, and painful  It may look swollen and red  · Blood soaks through your bandage  · You have severe pain in your ankle  · Your cast feels too tight  · Your foot or toes are cold or numb  · Your foot or toenails turn blue or gray  Call your doctor if:   · Your splint feels too tight  · Your swelling has increased or returned  · You have a fever  · Your pain does not go away, even after treatment  · You have questions or concerns about your condition or care  Medicines: You may need any of the following:  · Acetaminophen  decreases pain and fever  It is available without a doctor's order  Ask how much to take and how often to take it  Follow directions  Read the labels of all other medicines you are using to see if they also contain acetaminophen, or ask your doctor or pharmacist  Acetaminophen can cause liver damage if not taken correctly  Do not use more than 4 grams (4,000 milligrams) total of acetaminophen in one day  · NSAIDs , such as ibuprofen, help decrease swelling, pain, and fever  This medicine is available with or without a doctor's order  NSAIDs can cause stomach bleeding or kidney problems in certain people  If you take blood thinner medicine, always ask your healthcare provider if NSAIDs are safe for you  Always read the medicine label and follow directions  · Prescription pain medicine  may be given  Ask your healthcare provider how to take this medicine safely   Some prescription pain medicines contain acetaminophen  Do not take other medicines that contain acetaminophen without talking to your healthcare provider  Too much acetaminophen may cause liver damage  Prescription pain medicine may cause constipation  Ask your healthcare provider how to prevent or treat constipation  · Take your medicine as directed  Contact your healthcare provider if you think your medicine is not helping or if you have side effects  Tell him or her if you are allergic to any medicine  Keep a list of the medicines, vitamins, and herbs you take  Include the amounts, and when and why you take them  Bring the list or the pill bottles to follow-up visits  Carry your medicine list with you in case of an emergency  Self-care:       · Rest  your ankle so that it can heal  Return to normal activities as directed  · Apply ice  on your ankle for 15 to 20 minutes every hour or as directed  Use an ice pack, or put crushed ice in a plastic bag  Cover it with a towel  Ice helps prevent tissue damage and decreases swelling and pain  · Use a support device,  such as a brace, cast, or splint to limit your movement and protect your ankle  You may need to use crutches to protect your ankle and decrease your pain as you move around  Do not remove your device and do not put weight on your injured ankle  · Elevate  your ankle above the level of your heart as often as you can  This will help decrease swelling and pain  Prop your ankle on pillows or blankets to keep it elevated comfortably  Splint and cast care:  Cover the splint or cast before you bathe so it does not get wet  Tape 2 plastic trash bags to your skin above the cast  Try to keep your ankle out of the water as much as possible  Follow up with your doctor in 1 to 2 days, or as directed: Your fracture may need to be reduced (bones pushed back into place) or you may need surgery   Write down your questions so you remember to ask them during your visits  © Copyright [employer] 2022 Information is for End User's use only and may not be sold, redistributed or otherwise used for commercial purposes  All illustrations and images included in CareNotes® are the copyrighted property of A D A M [employer] Inc  or Diana Ceja  The above information is an  only  It is not intended as medical advice for individual conditions or treatments  Talk to your doctor, nurse or pharmacist before following any medical regimen to see if it is safe and effective for you

## 2022-03-22 NOTE — PATIENT INSTRUCTIONS
We recommend increasing your dairy (milk) intake to obtain calcium 1200 mg daily to help with bone health  If dairy is not an option, other milk options like almond, cashew and oat contain calcium and vitamin D  If you are unable to get this through natural food sources, you can take the following supplements to help with bone healing: Vitamin D 4000U (units) daily (with MK7) and calcium 1200 mg daily  You should take these supplements for the next 4 weeks  You should avoid NSAID's like ibuprofen and naproxen (brand names are Motrin, Advil and Aleve) unless absolutely needed as they can slow healing  You should avoid nicotine products as they can slow and impair healing

## 2022-03-22 NOTE — PROGRESS NOTES
[facility]        NAME: Cherise Kate is a 64 y o  female  : 1960    MRN: [id number]  DATE: 2022  TIME: 8:02 PM    Assessment and Plan   Closed fracture of right ankle, initial encounter [W06 [id number]]  1  Closed fracture of right ankle, initial encounter     2  Sprain of other ligament of right ankle, initial encounter  XR ankle 3+ vw right         Patient Instructions     Ankle fracture   Splint in place   Nonweightbearing  Follow up with PCP in 3-5 days  Proceed to  ER if symptoms worsen  Chief Complaint     Chief Complaint   Patient presents with    Ankle Pain     Sera Esteban off 1 step  heard a crack in her ankle         History of Present Illness       57-year-old female who presents complaining of right ankle pain  Patient states that she tripped while walking the dog and fell off the step  Patient denies head trauma, loss of consciousness    Ankle Pain         Review of Systems   Review of Systems   Constitutional: Negative  HENT: Negative  Eyes: Negative  Respiratory: Negative  Negative for cough, chest tightness, shortness of breath, wheezing and stridor  Cardiovascular: Negative  Negative for chest pain, palpitations and leg swelling  Musculoskeletal: Positive for arthralgias  Current Medications       Current Outpatient Medications:     levothyroxine (Synthroid) 150 mcg tablet, Take 1 tablet (150 mcg total) by mouth daily in the early morning, Disp: 30 tablet, Rfl: 5    sertraline (ZOLOFT) 100 mg tablet, TAKE ONE TABLET BY MOUTH EVERY DAY, Disp: 90 tablet, Rfl: 0    methocarbamol (ROBAXIN) 500 mg tablet, Take 1 tablet (500 mg total) by mouth daily at bedtime (Patient not taking: Reported on 3/21/2022 ), Disp: 30 tablet, Rfl: 0    predniSONE 10 mg tablet, 3 tabs po bid x2 days, then 2 tabs po bid x2 days, then 1 tab bid x2 days, then 1 daily until done   (Patient not taking: Reported on 3/21/2022 ), Disp: 26 tablet, Rfl: 0    valACYclovir (VALTREX) 1,000 mg tablet, Take 1 tablet (1,000 mg total) by mouth 2 (two) times a day for 10 days, Disp: 30 tablet, Rfl: 3    Current Allergies     Allergies as of 03/21/2022    (No Known Allergies)            The following portions of the patient's history were reviewed and updated as appropriate: allergies, current medications, past family history, past medical history, past social history, past surgical history and problem list      Past Medical History:   Diagnosis Date    Cataract, right     Detached retina, right     Disease of thyroid gland     OCD (obsessive compulsive disorder)     Seasonal allergies     Wrist fracture        Past Surgical History:   Procedure Laterality Date    CATARACT EXTRACTION      EYE SURGERY      FOOT SURGERY      MANDIBLE FRACTURE SURGERY      TONSILLECTOMY         Family History   Problem Relation Age of Onset    Heart disease Mother         cardiac    Colon cancer Father 72    Heart disease Father     No Known Problems Sister     No Known Problems Daughter     No Known Problems Maternal Grandmother     No Known Problems Maternal Grandfather     No Known Problems Paternal Grandmother     Prostate cancer Paternal Grandfather     No Known Problems Sister     No Known Problems Daughter     No Known Problems Maternal Aunt     No Known Problems Paternal Aunt     No Known Problems Paternal Aunt          Medications have been verified  Objective   /68   Pulse 80   Temp 97 5 °F (36 4 °C)   Resp 18   Ht 5' 3" (1 6 m)   Wt 61 2 kg (135 lb)   SpO2 96%   BMI 23 91 kg/m²        Physical Exam     Physical Exam  Constitutional:       General: She is not in acute distress  Appearance: She is well-developed  She is not diaphoretic  HENT:      Head: Normocephalic and atraumatic        Right Ear: Hearing, tympanic membrane, ear canal and external ear normal       Left Ear: Hearing, tympanic membrane, ear canal and external ear normal       Mouth/Throat: Pharynx: Uvula midline  Cardiovascular:      Rate and Rhythm: Normal rate and regular rhythm  Heart sounds: Normal heart sounds  Pulmonary:      Effort: Pulmonary effort is normal       Breath sounds: Normal breath sounds  Musculoskeletal:      Cervical back: Normal range of motion and neck supple  Right ankle: Swelling and ecchymosis present  No deformity or lacerations  Tenderness present over the lateral malleolus  No medial malleolus, ATF ligament, AITF ligament, posterior TF ligament, base of 5th metatarsal or proximal fibula tenderness  Decreased range of motion  Left ankle: Normal    Lymphadenopathy:      Cervical: No cervical adenopathy

## 2022-03-22 NOTE — PROGRESS NOTES
1  Closed fracture of distal end of right fibula, unspecified fracture morphology, initial encounter     2  Acute right ankle pain       No orders of the defined types were placed in this encounter  Impression:  Right ankle pain likely secondary to Falcon A distal fibula fracture with date of injury as 3/21/2022  The patient was provided with a controlled ankle motion boot and she will main nonweightbearing  We discussed calcium and vitamin-D supplementation  She does carry a diagnosis of osteoporosis  We will see her back in two weeks to reassess  If doing well, would consider progressing her weight-bearing status  Imaging Studies (I personally reviewed images in PACS and report):  Right ankle x-rays most recent to this encounter reviewed  These images show Falcon A distal fibula fracture with associated soft tissue swelling  Congruent syndesmosis  The patient's pertinent emergency department/urgent care/referring physician's notes were reviewed  CPT 19496  A splint/brace from another health care provider was removed today  Return in about 2 weeks (around 4/5/2022)  Patient is in agreement with the above plan  HPI:  Inge Villarreal is a 64 y o  female  who presents for evaluation of   Chief Complaint   Patient presents with    Right Ankle - Pain     Onset/Mechanism: 3/21/2022- she stepped off of her deck and she twisted her ankle  Location: Lateral ankle  Radiation: Denies  Provocative: Putting weight on it  Severity: Painful  Associated Symptoms: Swelling and bruising  Treatment so far: Crutches and splint      Following history reviewed and updated:  Past Medical History:   Diagnosis Date    Cataract, right     Detached retina, right     Disease of thyroid gland     OCD (obsessive compulsive disorder)     Seasonal allergies     Wrist fracture      Past Surgical History:   Procedure Laterality Date    CATARACT EXTRACTION      EYE SURGERY      FOOT SURGERY      MANDIBLE FRACTURE SURGERY      TONSILLECTOMY       Social History   Social History     Substance and Sexual Activity   Alcohol Use Yes     Social History     Substance and Sexual Activity   Drug Use Never     Social History     Tobacco Use   Smoking Status Never Smoker   Smokeless Tobacco Never Used     Family History   Problem Relation Age of Onset    Heart disease Mother         cardiac    Colon cancer Father 72    Heart disease Father     No Known Problems Sister     No Known Problems Daughter     No Known Problems Maternal Grandmother     No Known Problems Maternal Grandfather     No Known Problems Paternal Grandmother     Prostate cancer Paternal Grandfather     No Known Problems Sister     No Known Problems Daughter     No Known Problems Maternal Aunt     No Known Problems Paternal Aunt     No Known Problems Paternal Aunt      No Known Allergies     Constitutional:  /78   Pulse 62   Ht 5' 3" (1 6 m)   Wt 61 2 kg (135 lb)   BMI 23 91 kg/m²    General: NAD  Eyes: Anicteric sclerae  Neck: Supple  Lungs: Unlabored breathing  Cardiovascular: No lower extremity edema  Skin: Intact without erythema  Neurologic: Sensation intact to light touch  Psychiatric: Mood and affect are appropriate  Right Ankle Exam     Tenderness   The patient is experiencing tenderness in the lateral malleolus  Swelling: severe    Range of Motion   Dorsiflexion: abnormal   Plantar flexion: abnormal   Eversion: abnormal   Inversion: abnormal     Other   Erythema: absent  Scars: absent  Sensation: normal  Pulse: present     Comments:  Normal Hopkin's and Kleiger's               Procedures

## 2022-04-13 ENCOUNTER — OFFICE VISIT (OUTPATIENT)
Dept: OBGYN CLINIC | Facility: MEDICAL CENTER | Age: 62
End: 2022-04-13
Payer: COMMERCIAL

## 2022-04-13 VITALS
DIASTOLIC BLOOD PRESSURE: 82 MMHG | HEIGHT: 63 IN | WEIGHT: 135 LBS | SYSTOLIC BLOOD PRESSURE: 147 MMHG | HEART RATE: 67 BPM | BODY MASS INDEX: 23.92 KG/M2

## 2022-04-13 DIAGNOSIS — S82.831D CLOSED FRACTURE OF DISTAL END OF RIGHT FIBULA WITH ROUTINE HEALING, UNSPECIFIED FRACTURE MORPHOLOGY, SUBSEQUENT ENCOUNTER: Primary | ICD-10-CM

## 2022-04-13 PROCEDURE — 99213 OFFICE O/P EST LOW 20 MIN: CPT | Performed by: PHYSICAL MEDICINE & REHABILITATION

## 2022-04-13 NOTE — PROGRESS NOTES
1  Closed fracture of distal end of right fibula with routine healing, unspecified fracture morphology, subsequent encounter       No orders of the defined types were placed in this encounter  Impression:  Patient is here in follow up of right ankle pain likely secondary to Falcon A distal fibula fracture with date of injury as 3/21/2022  Patient will progress to weight-bearing as tolerated  She will continue with calcium and vitamin-D supplementation as she does carry a diagnosis of osteoporosis  I will see her back anywhere between 2-3 weeks to reassess  We will obtain x-rays at that point  If there is bony healing and she is doing well, would transition into supportive sneakers  We will check her vitamin D level as she has a history of hypovitaminosis D      Imaging Studies (I personally reviewed images in PACS and report):  Right ankle x-rays most recent to this encounter reviewed  These images show Falcon A distal fibula fracture with associated soft tissue swelling  Congruent syndesmosis  Return for 2-3 week f/u  Patient is in agreement with the above plan  HPI:  Filomena Tolliver is a 64 y o  female  who presents in follow up  Here for   Chief Complaint   Patient presents with    Right Ankle - Follow-up, Fracture     Since last visit:  See above      Following history reviewed and updated:  Past Medical History:   Diagnosis Date    Cataract, right     Detached retina, right     Disease of thyroid gland     OCD (obsessive compulsive disorder)     Seasonal allergies     Wrist fracture      Past Surgical History:   Procedure Laterality Date    CATARACT EXTRACTION      EYE SURGERY      FOOT SURGERY      MANDIBLE FRACTURE SURGERY      TONSILLECTOMY       Social History   Social History     Substance and Sexual Activity   Alcohol Use Yes     Social History     Substance and Sexual Activity   Drug Use Never     Social History     Tobacco Use   Smoking Status Never Smoker   Smokeless Tobacco Never Used     Family History   Problem Relation Age of Onset    Heart disease Mother         cardiac    Colon cancer Father 72    Heart disease Father     No Known Problems Sister     No Known Problems Daughter     No Known Problems Maternal Grandmother     No Known Problems Maternal Grandfather     No Known Problems Paternal Grandmother     Prostate cancer Paternal Grandfather     No Known Problems Sister     No Known Problems Daughter     No Known Problems Maternal Aunt     No Known Problems Paternal Aunt     No Known Problems Paternal Aunt      No Known Allergies     Constitutional:  /82   Pulse 67   Ht 5' 3" (1 6 m)   Wt 61 2 kg (135 lb)   BMI 23 91 kg/m²    General: NAD  Eyes: Clear sclerae  ENT: No inflammation, lesion, or mass of lips  No tracheal deviation  Musculoskeletal: As mentioned below  Integumentary: No visible rashes or skin lesions  Pulmonary/Chest: Effort normal  No respiratory distress  Neuro: CN's grossly intact, RIVAS  Psych: Normal affect and judgement  Vascular: WWP  Right Ankle Exam     Tenderness   The patient is experiencing tenderness in the lateral malleolus    Swelling: mild    Range of Motion   Dorsiflexion: normal   Plantar flexion: normal   Eversion: normal   Inversion: abnormal     Other   Erythema: absent  Scars: absent  Sensation: normal  Pulse: present              Procedures

## 2022-04-18 ENCOUNTER — TELEPHONE (OUTPATIENT)
Dept: OBGYN CLINIC | Facility: OTHER | Age: 62
End: 2022-04-18

## 2022-04-18 NOTE — TELEPHONE ENCOUNTER
email sent to [facility]!!    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     Patient is :  Theodore Alatorre   : 50-   MRN : [id number]  C/b # [id number]  Reason for Appointment : 2-3 week follow up  Requested Doctor & Location : Dr Carlos Mac @ [address] [place] [address]    Thank you    Carlos Mac

## 2022-04-21 ENCOUNTER — TELEPHONE (OUTPATIENT)
Dept: GASTROENTEROLOGY | Facility: CLINIC | Age: 62
End: 2022-04-21

## 2022-04-21 NOTE — TELEPHONE ENCOUNTER
Note    04/18/22  Screened by: Brayan Houston     Referring Provider Alicia Santillan     Pre- Screening:      Body mass index is 23 91 kg/m²  Has patient been referred for a routine screening Colonoscopy? yes  Is the patient between 39-70 years old? yes        Previous Colonoscopy yes   If yes:    Date: 10 years ago    Facility:     Reason:         SCHEDULING STAFF: If the patient is between 45yrs-49yrs, please advise patient to confirm benefits/coverage with their insurance company for a routine screening colonoscopy, some insurance carriers will only cover at [address] or older  If the patient is over 66years old, please schedule an office visit       Does the patient want to see a Gastroenterologist prior to their procedure OR are they having any GI symptoms? no     Has the patient been hospitalized or had abdominal surgery in the past 6 months? no     Does the patient use supplemental oxygen? no     Does the patient take Coumadin, Lovenox, Plavix, Elliquis, Xarelto, or other blood thinning medication? no     Has the patient had a stroke, cardiac event, or stent placed in the past year? no      PT PASSED OA BEST CONTACT NUMBER [phone]     SCHEDULING STAFF: If patient answers NO to above questions, then schedule procedure  If patient answers YES to above questions, then schedule office appointment       If patient is between 45yrs - 49yrs, please advise patient that we will have to confirm benefits & coverage with their insurance company for a routine screening colonoscopy

## 2022-04-21 NOTE — TELEPHONE ENCOUNTER
Left message for patient to call back to schedule oa colonoscopy  Gave my direct line to call back on

## 2022-04-28 ENCOUNTER — PREP FOR PROCEDURE (OUTPATIENT)
Dept: GASTROENTEROLOGY | Facility: CLINIC | Age: 62
End: 2022-04-28

## 2022-04-28 DIAGNOSIS — Z12.11 COLON CANCER SCREENING: Primary | ICD-10-CM

## 2022-04-28 NOTE — TELEPHONE ENCOUNTER
Scheduled date of colonoscopy (as of today):  7/20/22  Physician performing colonoscopy: Dr Anil Whitley  [facility] of colonoscopy[facility]  Bowel prep reviewed with patient: miralax/dulcolax - prep instructions mailed to the patient     Instructions reviewed with patient by: Maxx Mtz  Clearances:  n/a

## 2022-05-04 ENCOUNTER — OFFICE VISIT (OUTPATIENT)
Dept: OBGYN CLINIC | Facility: MEDICAL CENTER | Age: 62
End: 2022-05-04
Payer: COMMERCIAL

## 2022-05-04 ENCOUNTER — APPOINTMENT (OUTPATIENT)
Dept: RADIOLOGY | Facility: MEDICAL CENTER | Age: 62
End: 2022-05-04
Payer: COMMERCIAL

## 2022-05-04 ENCOUNTER — APPOINTMENT (OUTPATIENT)
Dept: LAB | Facility: MEDICAL CENTER | Age: 62
End: 2022-05-04
Payer: COMMERCIAL

## 2022-05-04 VITALS
SYSTOLIC BLOOD PRESSURE: 135 MMHG | HEART RATE: 70 BPM | WEIGHT: 136 LBS | BODY MASS INDEX: 24.1 KG/M2 | DIASTOLIC BLOOD PRESSURE: 84 MMHG | HEIGHT: 63 IN

## 2022-05-04 DIAGNOSIS — S82.831D CLOSED FRACTURE OF DISTAL END OF RIGHT FIBULA WITH ROUTINE HEALING, UNSPECIFIED FRACTURE MORPHOLOGY, SUBSEQUENT ENCOUNTER: Primary | ICD-10-CM

## 2022-05-04 DIAGNOSIS — E55.9 HYPOVITAMINOSIS D: ICD-10-CM

## 2022-05-04 DIAGNOSIS — S82.831D CLOSED FRACTURE OF DISTAL END OF RIGHT FIBULA WITH ROUTINE HEALING, UNSPECIFIED FRACTURE MORPHOLOGY, SUBSEQUENT ENCOUNTER: ICD-10-CM

## 2022-05-04 DIAGNOSIS — E03.9 HYPOTHYROIDISM, UNSPECIFIED TYPE: ICD-10-CM

## 2022-05-04 LAB
25(OH)D3 SERPL-MCNC: 46.8 NG/ML (ref 30–100)
TSH SERPL DL<=0.05 MIU/L-ACNC: 1.48 UIU/ML (ref 0.45–4.5)

## 2022-05-04 PROCEDURE — 73610 X-RAY EXAM OF ANKLE: CPT

## 2022-05-04 PROCEDURE — 82306 VITAMIN D 25 HYDROXY: CPT

## 2022-05-04 PROCEDURE — 99213 OFFICE O/P EST LOW 20 MIN: CPT | Performed by: PHYSICAL MEDICINE & REHABILITATION

## 2022-05-04 PROCEDURE — 84443 ASSAY THYROID STIM HORMONE: CPT

## 2022-05-04 PROCEDURE — 36415 COLL VENOUS BLD VENIPUNCTURE: CPT

## 2022-05-04 NOTE — PROGRESS NOTES
1  Closed fracture of distal end of right fibula with routine healing, unspecified fracture morphology, subsequent encounter  XR ankle 3+ vw right    Vitamin D 25 hydroxy   2  Hypovitaminosis D  Vitamin D 25 hydroxy     Orders Placed This Encounter   Procedures    XR ankle 3+ vw right    Vitamin D 25 hydroxy        Impression:  Patient is here in follow up of right ankle pain likely secondary to Falcon A distal fibula fracture with date of injury as 3/21/2022  Patient will transition into supportive sneakers and work on the home exercise program that was provided to her  I will also check her vitamin-D level  We will call her with the results       Imaging Studies (I personally reviewed images in PACS and report):  Right ankle x-rays most recent to this encounter reviewed   These images show Falcon A distal fibula fracture that is mostly healed  No follow-ups on file  Patient is in agreement with the above plan  HPI:  Bernie Brice is a 64 y o  female  who presents in follow up  Here for   Chief Complaint   Patient presents with    Right Ankle - Follow-up       Since last visit: Improving      Following history reviewed and updated:  Past Medical History:   Diagnosis Date    Cataract, right     Detached retina, right     Disease of thyroid gland     OCD (obsessive compulsive disorder)     Seasonal allergies     Wrist fracture      Past Surgical History:   Procedure Laterality Date    CATARACT EXTRACTION      EYE SURGERY      FOOT SURGERY      MANDIBLE FRACTURE SURGERY      TONSILLECTOMY       Social History   Social History     Substance and Sexual Activity   Alcohol Use Yes     Social History     Substance and Sexual Activity   Drug Use Never     Social History     Tobacco Use   Smoking Status Never Smoker   Smokeless Tobacco Never Used     Family History   Problem Relation Age of Onset    Heart disease Mother         cardiac    Colon cancer Father 72    Heart disease Father     No Known Problems Sister     No Known Problems Daughter     No Known Problems Maternal Grandmother     No Known Problems Maternal Grandfather     No Known Problems Paternal Grandmother     Prostate cancer Paternal Grandfather     No Known Problems Sister     No Known Problems Daughter     No Known Problems Maternal Aunt     No Known Problems Paternal Aunt     No Known Problems Paternal Aunt      No Known Allergies     Constitutional:  /84   Pulse 70   Ht 5' 3" (1 6 m)   Wt 61 7 kg (136 lb)   BMI 24 09 kg/m²    General: NAD  Eyes: Clear sclerae  ENT: No inflammation, lesion, or mass of lips  No tracheal deviation  Musculoskeletal: As mentioned below  Integumentary: No visible rashes or skin lesions  Pulmonary/Chest: Effort normal  No respiratory distress  Neuro: CN's grossly intact, RIVAS  Psych: Normal affect and judgement  Vascular: WWP  Right Ankle Exam     Tenderness   The patient is experiencing tenderness in the lateral malleolus  Swelling: none    Range of Motion   The patient has normal right ankle ROM      Other   Erythema: absent  Scars: absent  Sensation: normal  Pulse: present              Procedures

## 2022-05-05 DIAGNOSIS — E03.9 HYPOTHYROIDISM, UNSPECIFIED TYPE: ICD-10-CM

## 2022-05-05 RX ORDER — LEVOTHYROXINE SODIUM 0.15 MG/1
150 TABLET ORAL
Qty: 90 TABLET | Refills: 1 | Status: SHIPPED | OUTPATIENT
Start: 2022-05-05

## 2022-06-22 ENCOUNTER — TELEMEDICINE (OUTPATIENT)
Dept: FAMILY MEDICINE CLINIC | Facility: CLINIC | Age: 62
End: 2022-06-22
Payer: COMMERCIAL

## 2022-06-22 DIAGNOSIS — U07.1 COVID-19: Primary | ICD-10-CM

## 2022-06-22 PROCEDURE — 99213 OFFICE O/P EST LOW 20 MIN: CPT | Performed by: FAMILY MEDICINE

## 2022-06-22 RX ORDER — BROMPHENIRAMINE MALEATE, PSEUDOEPHEDRINE HYDROCHLORIDE, AND DEXTROMETHORPHAN HYDROBROMIDE 2; 30; 10 MG/5ML; MG/5ML; MG/5ML
10 SYRUP ORAL 4 TIMES DAILY PRN
Qty: 180 ML | Refills: 0 | Status: SHIPPED | OUTPATIENT
Start: 2022-06-22 | End: 2022-07-11 | Stop reason: ALTCHOICE

## 2022-06-22 RX ORDER — PREDNISONE 10 MG/1
TABLET ORAL
Qty: 26 TABLET | Refills: 0 | Status: SHIPPED | OUTPATIENT
Start: 2022-06-22 | End: 2022-07-11 | Stop reason: ALTCHOICE

## 2022-06-22 NOTE — PROGRESS NOTES
COVID-19 Outpatient Progress Note    Assessment/Plan:    Problem List Items Addressed This Visit    None        Disposition:     Patient has COVID-19 infection  Based off CDC guidelines, they were recommended to isolate for 5 days from the date of the positive test  If they remain asymptomatic, isolation may be ended followed by 5 days of wearing a mask when around othes to minimize risk of infecting others  If they have a fever, continue to stay home until fever resolves for at least 24 hours  I have spent 15 minutes directly with the patient  Greater than 50% of this time was spent in counseling/coordination of care regarding: diagnostic results, prognosis, risks and benefits of treatment options, instructions for management, patient and family education, importance of treatment compliance, risk factor reductions and impressions  Encounter provider Reinaldo Fernandez DO    Provider located at [address] [place] [address] Drive  [address] [place] [id number]    Recent Visits  No visits were found meeting these conditions  Showing recent visits within past 7 days and meeting all other requirements  Today's Visits  Date Type Provider Dept   06/22/22 [address]  today's visits and meeting all other requirements  Future Appointments  No visits were found meeting these conditions  Showing future appointments within next 150 days and meeting all other requirements     [facility] and patient was informed that this is a secure, HIPAA-compliant platform  She agrees to proceed  Patient agrees to participate in a virtual check in via telephone or video visit instead of presenting to the office to address urgent/immediate medical needs  Patient is aware this is a billable service  After connecting through [facility], the patient was identified by name and date of birth   Miguel Villalobos was informed that this was a telemedicine visit and that the exam was being conducted confidentially over secure lines  My office door was closed  No one else was in the room  Theodore Alatorre acknowledged consent and understanding of privacy and security of the telemedicine visit  I informed the patient that I have reviewed her record in Epic and presented the opportunity for her to ask any questions regarding the visit today  The patient agreed to participate  Verification of patient location:  Patient is located in the following state in which I hold an active license: [place]    Subjective:   Theodore Alatorre is a 64 y o  female who has been screened for COVID-19  Symptom change since last report: unchanged  Patient's symptoms include fever, chills, malaise, nasal congestion, rhinorrhea, sore throat, cough, myalgias and headache  Patient denies fatigue, anosmia, loss of taste, shortness of breath, chest tightness, abdominal pain, nausea, vomiting and diarrhea  - Date of symptom onset: 6/22/2022  - Date of positive COVID-19 test: 6/22/2022  Type of test: Rapid antigen  COVID-19 vaccination status: Fully vaccinated (primary series)    Juanita Thompson has been staying home and has isolated themselves in her home  She is taking care to not share personal items and is cleaning all surfaces that are touched often, like counters, tabletops, and doorknobs using household cleaning sprays or wipes  She is wearing a mask when she leaves her room       Lab Results   Component Value Date    SARSCOV2 Negative 08/13/2021     Past Medical History:   Diagnosis Date    Cataract, right     Detached retina, right     Disease of thyroid gland     OCD (obsessive compulsive disorder)     Seasonal allergies     Wrist fracture      Past Surgical History:   Procedure Laterality Date    CATARACT EXTRACTION      EYE SURGERY      FOOT SURGERY      MANDIBLE FRACTURE SURGERY      TONSILLECTOMY       Current Outpatient Medications   Medication Sig Dispense Refill    levothyroxine (Synthroid) 150 mcg tablet Take 1 tablet (150 mcg total) by mouth daily in the early morning 90 tablet 1    sertraline (ZOLOFT) 100 mg tablet TAKE ONE TABLET BY MOUTH EVERY DAY 90 tablet 0     No current facility-administered medications for this visit  No Known Allergies    Review of Systems   Constitutional: Positive for chills and fever  Negative for fatigue  HENT: Positive for congestion, rhinorrhea, sinus pressure, sinus pain and sore throat  Respiratory: Positive for cough  Negative for chest tightness and shortness of breath  Gastrointestinal: Negative for abdominal pain, diarrhea, nausea and vomiting  Musculoskeletal: Positive for myalgias  Neurological: Positive for headaches  All other systems reviewed and are negative  Objective: There were no vitals filed for this visit  Physical Exam  Vitals reviewed  Constitutional:       Appearance: Normal appearance  She is not ill-appearing  Eyes:      General:         Right eye: No discharge  Left eye: No discharge  Pulmonary:      Effort: No respiratory distress  Skin:     Findings: No rash  Neurological:      General: No focal deficit present  Mental Status: She is alert and oriented to person, place, and time  Psychiatric:         Mood and Affect: Mood normal          Behavior: Behavior normal          Thought Content: Thought content normal          Judgment: Judgment normal          VIRTUAL VISIT DISCLAIMER    Neva Pelaez verbally agrees to participate in [employer]  Pt is aware that [employer] could be limited without vital signs or the ability to perform a full hands-on physical exam  Lisa Dorman understands she or the provider may request at any time to terminate the video visit and request the patient to seek care or treatment in person

## 2022-07-01 DIAGNOSIS — U07.1 COVID-19: Primary | ICD-10-CM

## 2022-07-01 RX ORDER — BENZONATATE 200 MG/1
200 CAPSULE ORAL 3 TIMES DAILY PRN
Qty: 30 CAPSULE | Refills: 0 | Status: SHIPPED | OUTPATIENT
Start: 2022-07-01 | End: 2022-07-06 | Stop reason: SDUPTHER

## 2022-07-06 ENCOUNTER — TELEPHONE (OUTPATIENT)
Dept: FAMILY MEDICINE CLINIC | Facility: CLINIC | Age: 62
End: 2022-07-06

## 2022-07-06 DIAGNOSIS — U07.1 COVID-19: ICD-10-CM

## 2022-07-06 RX ORDER — BENZONATATE 200 MG/1
200 CAPSULE ORAL 3 TIMES DAILY PRN
Qty: 30 CAPSULE | Refills: 0 | Status: SHIPPED | OUTPATIENT
Start: 2022-07-06

## 2022-07-06 NOTE — TELEPHONE ENCOUNTER
Pt is asking if the script for tessalon perles be resent to [facility] pharmacy in [place]

## 2022-07-11 ENCOUNTER — OFFICE VISIT (OUTPATIENT)
Dept: FAMILY MEDICINE CLINIC | Facility: CLINIC | Age: 62
End: 2022-07-11
Payer: COMMERCIAL

## 2022-07-11 VITALS
OXYGEN SATURATION: 98 % | WEIGHT: 139.2 LBS | HEART RATE: 88 BPM | TEMPERATURE: 97.4 F | DIASTOLIC BLOOD PRESSURE: 74 MMHG | SYSTOLIC BLOOD PRESSURE: 128 MMHG | HEIGHT: 63 IN | BODY MASS INDEX: 24.66 KG/M2

## 2022-07-11 DIAGNOSIS — H69.83 DYSFUNCTION OF BOTH EUSTACHIAN TUBES: Primary | ICD-10-CM

## 2022-07-11 DIAGNOSIS — Z12.11 COLON CANCER SCREENING: ICD-10-CM

## 2022-07-11 PROCEDURE — 99213 OFFICE O/P EST LOW 20 MIN: CPT | Performed by: FAMILY MEDICINE

## 2022-07-11 RX ORDER — DOXYCYCLINE HYCLATE 100 MG/1
CAPSULE ORAL
COMMUNITY
Start: 2022-05-23

## 2022-07-11 RX ORDER — PREDNISONE 10 MG/1
TABLET ORAL
Qty: 26 TABLET | Refills: 0 | Status: SHIPPED | OUTPATIENT
Start: 2022-07-11

## 2022-07-11 RX ORDER — AZELASTINE 1 MG/ML
1 SPRAY, METERED NASAL 2 TIMES DAILY
Qty: 1 ML | Refills: 3 | Status: SHIPPED | OUTPATIENT
Start: 2022-07-11

## 2022-07-11 NOTE — PROGRESS NOTES
Patient ID: Junie Florez is a 64 y o  female  HPI: 64 y  o female presenting with symptoms of ear pressure, crackling of ears and dizziness associated with positional changes  Symptoms for the past week  SUBJECTIVE    Family History   Problem Relation Age of Onset    Heart disease Mother         cardiac    Colon cancer Father 72    Heart disease Father     No Known Problems Sister     No Known Problems Daughter     No Known Problems Maternal Grandmother     No Known Problems Maternal Grandfather     No Known Problems Paternal Grandmother     Prostate cancer Paternal Grandfather     No Known Problems Sister     No Known Problems Daughter     No Known Problems Maternal Aunt     No Known Problems Paternal Aunt     No Known Problems Paternal Aunt      Social History     Socioeconomic History    Marital status: /Civil Union     Spouse name: Not on file    Number of children: Not on file    Years of education: Not on file    Highest education level: Not on file   Occupational History    Not on file   Tobacco Use    Smoking status: Never Smoker    Smokeless tobacco: Never Used   Vaping Use    Vaping Use: Never used   Substance and Sexual Activity    Alcohol use:  Yes    Drug use: Never    Sexual activity: Not on file   Other Topics Concern    Not on file   Social History Narrative    Daily coffee    Denied cola    Denied tea     Social Determinants of Health     Financial Resource Strain: Not on file   Food Insecurity: Not on file   Transportation Needs: Not on file   Physical Activity: Not on file   Stress: Not on file   Social Connections: Not on file   Intimate Partner Violence: Not on file   Housing Stability: Not on file     Past Medical History:   Diagnosis Date    Cataract, right     Detached retina, right     Disease of thyroid gland     OCD (obsessive compulsive disorder)     Seasonal allergies     Wrist fracture      Past Surgical History:   Procedure Laterality Date  CATARACT EXTRACTION      EYE SURGERY      FOOT SURGERY      MANDIBLE FRACTURE SURGERY      TONSILLECTOMY       No Known Allergies    Current Outpatient Medications:     azelastine (ASTELIN) 0 1 % nasal spray, 1 spray into each nostril 2 (two) times a day Use in each nostril as directed, Disp: 1 mL, Rfl: 3    benzonatate (TESSALON) 200 MG capsule, Take 1 capsule (200 mg total) by mouth 3 (three) times a day as needed for cough, Disp: 30 capsule, Rfl: 0    levothyroxine (Synthroid) 150 mcg tablet, Take 1 tablet (150 mcg total) by mouth daily in the early morning, Disp: 90 tablet, Rfl: 1    predniSONE 10 mg tablet, 3 tabs po bid x2 days, then 2 tabs po bid x2 days, then 1 tab bid x2 days, then 1 daily until done , Disp: 26 tablet, Rfl: 0    sertraline (ZOLOFT) 100 mg tablet, TAKE ONE TABLET BY MOUTH EVERY DAY, Disp: 90 tablet, Rfl: 0    doxycycline hyclate (VIBRAMYCIN) 100 mg capsule, TAKE 2 CAPSULES X 1 DOSE (Patient not taking: Reported on 7/11/2022), Disp: , Rfl:     Review of Systems  Constitutional:     Denies fever, chills, fatigue, weakness ,weight loss, weight gain       ENT: Denies earache, loss of hearing, nosebleed, nasal discharge,nasal congestion, sore throat,hoarseness, but complains of ear pressure,and crackling    Pulmonary: Denies shortness of breath ,cough , dyspnea on exertionon, orthopnea , PND   Cardiovascular:  Denies bradycardia , tachycardia ,palpations, lower extremity, edema leg, claudication  Breast:  Denies new or changing breast lumps,  nipple discharge, nipple changes,  Abdomen:  Denies abdominal pain , anorexia ,indigestion, nausea ,vomiting, constipation , diarrhea  Musculoskeletal: Denies myalgias, arthralgias, joint swelling, joint stiffness ,limb pain, limb swelling  Lymph:denies swollen glands  Gu: no dysuria or urinary frequency  Skin: Denies skin rash, skin lesion, skin wound, itching,dry skin  Neuro: Denies headache, numbness, tingling, confusion, loss of consciousness, but complains of postitional vertigo  Psychiatric: Denies feelings of depression, suicidal ideation, anxiety, sleep disturbances    OBJECTIVE  /74   Pulse 88   Temp (!) 97 4 °F (36 3 °C)   Ht 5' 3" (1 6 m)   Wt 63 1 kg (139 lb 3 2 oz)   SpO2 98%   BMI 24 66 kg/m²   Constitutional:   NAD, well appearing and well nourished      ENT:   Conjunctiva and lids: no injection, edema, or discharge     Pupils and iris: LEXIE bilaterally    External inspection of ears and nose: normal without deformities or discharge  Otoscopic exam: Canals patent with tm dull, no erythema,but large effusions noted bilaterally  ENasal mucosa, septum and turbinates: Turbinae injection with discharge   Oropharynx:  Moist mucosa, normal tongue and tonsils without lesions  Erythema and injection  of post pharynx with pnd      Pulmonary:Respiratory effort normal rate and rhythm, no increased work of breathing  Auscultation of lungs:  Clear bilaterally with no adventitious breath sounds       Cardiovascular: regular rate and rhythm, S1 and S2, no murmur, no edema and/or varicosities of LE      Abdomen: Soft and non-distended     Positive bowel sounds      No heptomegaly or splenomegaly      Lymphatic: Anterior and posterior cervical lymphadenopathy         Muscskeletal:  Gait and station: Normal gait      Digits and nails normal without clubbing or cyanosis       Inspection/palpation of joints, bones, and muscles:  No joint tenderness, swelling, full active and passive range of motion       Gu: no suprabubic tenderness, CVA tenderness or urethral discharge  Skin: Normal skin turgor and no rashes      Neuro:       Normal reflexes     Psych:   alert and oriented to person, place and time     normal mood and affect       Assessment/Plan:Diagnoses and all orders for this visit:    Dysfunction of both eustachian tubes  Comments:  Continue claritin tx    Orders:  -     azelastine (ASTELIN) 0 1 % nasal spray; 1 spray into each nostril 2 (two) times a day Use in each nostril as directed  -     predniSONE 10 mg tablet; 3 tabs po bid x2 days, then 2 tabs po bid x2 days, then 1 tab bid x2 days, then 1 daily until done  Colon cancer screening  -     Cologuard    Other orders  -     doxycycline hyclate (VIBRAMYCIN) 100 mg capsule; TAKE 2 CAPSULES X 1 DOSE (Patient not taking: Reported on 7/11/2022)        Reviewed with patient plan to treat with above plan      Patient instructed to call in 72 hours if not feeling better or if symptoms worsen

## 2022-08-01 DIAGNOSIS — F32.A DEPRESSION, UNSPECIFIED DEPRESSION TYPE: ICD-10-CM

## 2022-08-01 RX ORDER — SERTRALINE HYDROCHLORIDE 100 MG/1
TABLET, FILM COATED ORAL
Qty: 90 TABLET | Refills: 0 | Status: SHIPPED | OUTPATIENT
Start: 2022-08-01

## 2022-08-17 ENCOUNTER — CLINICAL SUPPORT (OUTPATIENT)
Dept: FAMILY MEDICINE CLINIC | Facility: CLINIC | Age: 62
End: 2022-08-17
Payer: COMMERCIAL

## 2022-08-17 DIAGNOSIS — M81.0 OSTEOPOROSIS, UNSPECIFIED OSTEOPOROSIS TYPE, UNSPECIFIED PATHOLOGICAL FRACTURE PRESENCE: Primary | ICD-10-CM

## 2022-08-17 PROCEDURE — 96372 THER/PROPH/DIAG INJ SC/IM: CPT | Performed by: FAMILY MEDICINE

## 2022-08-25 ENCOUNTER — APPOINTMENT (OUTPATIENT)
Dept: LAB | Facility: MEDICAL CENTER | Age: 62
End: 2022-08-25
Payer: COMMERCIAL

## 2022-11-09 ENCOUNTER — TELEPHONE (OUTPATIENT)
Dept: FAMILY MEDICINE CLINIC | Facility: CLINIC | Age: 62
End: 2022-11-09

## 2022-11-09 NOTE — TELEPHONE ENCOUNTER
----- Message from Anushka Costa sent at 11/9/2022  7:47 AM EST -----  Regarding: FW: Can I possibly be seen tomorrow afternoon, after 4pm    ----- Message -----  From: Gena Lyons  Sent: 11/8/2022   6:45 PM EST  To: [facility]  Subject: Can I possibly be seen tomorrow afternoon, a#    I just discovered today  in the afternoon something on my upper back that was itchy, my  looked, it is swollen, red, raised, and has a black center  We don’t believe it’s a tick, but not sure, looks different   It is quite sore tho, thanks

## 2022-11-09 NOTE — TELEPHONE ENCOUNTER
Pt unable to come in at 2 due to another appt  Will send a pic of what the area looks like  States her coworkers looked at it and said it's not a tick bite

## 2022-11-10 ENCOUNTER — TELEPHONE (OUTPATIENT)
Dept: FAMILY MEDICINE CLINIC | Facility: CLINIC | Age: 62
End: 2022-11-10

## 2022-11-10 NOTE — TELEPHONE ENCOUNTER
----- Message from Richard Sesay sent at 11/10/2022  9:14 AM EST -----  Regarding: FW: Follow up with Tami Mosher     ----- Message -----  From: Eula Patton  Sent: 11/10/2022   8:37 AM EST  To: Lauri Monteiro [facility]  Subject: Follow up with Donavan Gold good morning, sorry I had dentist appt yesterday, I am off  tomorrow, Friday , can I possibly be seen anytime, thank you

## 2022-11-11 ENCOUNTER — OFFICE VISIT (OUTPATIENT)
Dept: FAMILY MEDICINE CLINIC | Facility: CLINIC | Age: 62
End: 2022-11-11

## 2022-11-11 VITALS
WEIGHT: 140 LBS | HEART RATE: 73 BPM | TEMPERATURE: 98 F | HEIGHT: 63 IN | BODY MASS INDEX: 24.8 KG/M2 | OXYGEN SATURATION: 97 % | SYSTOLIC BLOOD PRESSURE: 92 MMHG | DIASTOLIC BLOOD PRESSURE: 62 MMHG

## 2022-11-11 DIAGNOSIS — S30.860A TICK BITE OF BACK, INITIAL ENCOUNTER: Primary | ICD-10-CM
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
## 2022-11-11 DIAGNOSIS — Z12.31 BREAST CANCER SCREENING BY MAMMOGRAM: ICD-10-CM

## 2022-11-11 DIAGNOSIS — Z78.0 ASYMPTOMATIC POSTMENOPAUSAL ESTROGEN DEFICIENCY: ICD-10-CM

## 2022-11-11 RX ORDER — CEPHALEXIN 500 MG/1
500 CAPSULE ORAL EVERY 8 HOURS SCHEDULED
Qty: 21 CAPSULE | Refills: 0 | Status: SHIPPED | OUTPATIENT
Start: 2022-11-11 | End: 2022-11-18

## 2022-11-11 NOTE — TELEPHONE ENCOUNTER
It doesn't make a difference  I used tick bite; that will get it covered    The site is documented in the note

## 2022-11-11 NOTE — TELEPHONE ENCOUNTER
Per patient, must indicate site   Called patient and left message on voicemail that new order was entered

## 2022-11-11 NOTE — TELEPHONE ENCOUNTER
Patient asking if you can change the diagnosis on her lab for the lymes test      She is asking if you can put the body part rather than "unspecified site"

## 2022-11-12 ENCOUNTER — APPOINTMENT (OUTPATIENT)
Dept: LAB | Facility: MEDICAL CENTER | Age: 62
End: 2022-11-12

## 2022-11-12 DIAGNOSIS — S30.860A TICK BITE OF BACK, INITIAL ENCOUNTER: ICD-10-CM
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
## 2022-11-13 LAB — B BURGDOR IGG+IGM SER-ACNC: <0.2 AI

## 2022-11-15 NOTE — PROGRESS NOTES
Patient ID: Conrad Ojeda is a 58 y o  female  HPI: 58 y  o female presents for evaluaiton of what appears to be a possible tick bite of her thorax  She denies any fever, chills, warmth or Bull's eye rash   There is some minor erythema around the puncture wound  SUBJECTIVE    Family History   Problem Relation Age of Onset   • Heart disease Mother         cardiac   • Colon cancer Father 72   • Heart disease Father    • No Known Problems Sister    • No Known Problems Daughter    • No Known Problems Maternal Grandmother    • No Known Problems Maternal Grandfather    • No Known Problems Paternal Grandmother    • Prostate cancer Paternal Grandfather    • No Known Problems Sister    • No Known Problems Daughter    • No Known Problems Maternal Aunt    • No Known Problems Paternal Aunt    • No Known Problems Paternal Aunt      Social History     Socioeconomic History   • Marital status: /Civil Union     Spouse name: Not on file   • Number of children: Not on file   • Years of education: Not on file   • Highest education level: Not on file   Occupational History   • Not on file   Tobacco Use   • Smoking status: Never Smoker   • Smokeless tobacco: Never Used   Vaping Use   • Vaping Use: Never used   Substance and Sexual Activity   • Alcohol use:  Yes   • Drug use: Never   • Sexual activity: Not on file   Other Topics Concern   • Not on file   Social History Narrative    Daily coffee    Denied cola    Denied tea     Social Determinants of Health     Financial Resource Strain: Not on file   Food Insecurity: Not on file   Transportation Needs: Not on file   Physical Activity: Not on file   Stress: Not on file   Social Connections: Not on file   Intimate Partner Violence: Not on file   Housing Stability: Not on file     Past Medical History:   Diagnosis Date   • Cataract, right    • Detached retina, right    • Disease of thyroid gland    • OCD (obsessive compulsive disorder)    • Seasonal allergies    • Wrist fracture      Past Surgical History:   Procedure Laterality Date   • CATARACT EXTRACTION     • EYE SURGERY     • FOOT SURGERY     • MANDIBLE FRACTURE SURGERY     • TONSILLECTOMY       No Known Allergies    Current Outpatient Medications:   •  cephalexin (KEFLEX) 500 mg capsule, Take 1 capsule (500 mg total) by mouth every 8 (eight) hours for 7 days, Disp: 21 capsule, Rfl: 0  •  levothyroxine (Synthroid) 150 mcg tablet, Take 1 tablet (150 mcg total) by mouth daily in the early morning, Disp: 90 tablet, Rfl: 1  •  sertraline (ZOLOFT) 100 mg tablet, TAKE ONE TABLET BY MOUTH EVERY DAY, Disp: 90 tablet, Rfl: 0    Review of Systems  Constitutional:     Denies fever, chills ,fatigue ,weakness ,weight loss, weight gain     ENT: Denies earache ,loss of hearing ,nosebleed, nasal discharge,nasal congestion ,sore throat ,hoarseness  Pulmonary: Denies shortness of breath ,cough  ,dyspnea on exertion, orthopnea  ,PND   Cardiovascular:  Denies bradycardia , tachycardia  ,palpations, lower extremity edema leg, claudication  Breast:  Denies new or changing breast lumps ,nipple discharge ,nipple changes  Abdomen:  Denies abdominal pain , anorexia , indigestion, nausea, vomiting, constipation, diarrhea  Musculoskeletal: Denies myalgias, arthralgias, joint swelling, joint stiffness , limb pain, limb swelling  Gu: denies dysuria, polyuria  Skin: Denies skin rash,+puncture wound of back with erythema surrounding site and not bull's eye rash and no warmth  Neuro: Denies headache, numbness, tingling, confusion, loss of consciousness, dizziness, vertigo  Psychiatric: Denies feelings of depression, suicidal ideation, anxiety, sleep disturbances    OBJECTIVE  BP 92/62   Pulse 73   Temp 98 °F (36 7 °C)   Ht 5' 3" (1 6 m)   Wt 63 5 kg (140 lb)   SpO2 97%   BMI 24 80 kg/m²   Constitutional:   NAD, well appearing and well nourished      ENT:   Conjunctiva and lids: no injection, edema, or discharge     Pupils and iris: LEXIE bilaterally External inspection of ears and nose: normal without deformities or discharge  Otoscopic exam: Canals patent without erythema  Nasal mucosa, septum and turbinates: Normal or edema or discharge         Oropharynx:  Moist mucosa, normal tongue and tonsils without lesions  No erythema        Pulmonary:Respiratory effort normal rate and rhythm, no increased work of breathing  Auscultation of lungs:  Clear bilaterally with no adventitious breath sounds       Cardiovascular: regular rate and rhythm, S1 and S2, no murmur, no edema and/or varicosities of LE      Abdomen: Soft and non-distended     Positive bowel sounds      No heptomegaly or splenomegaly      Gu: no suprapubic tenderness or CVA tenderness, no urethral discharge  Lymphatic:  No anterior or posterior cervical lymphadenopathy         Musculoskeletal:  Gait and station: Normal gait      Digits and nails normal without clubbing or cyanosis       Inspection/palpation of joints, bones, and muscles:  No joint tenderness, swelling, full active and passive range of motion       Skin: Normal skin turgor and no rashes  + puncture wound of thorax on R with erythema surrounding site with no Bull's eye rash, no warmth noted  Neuro:       Normal reflexes       Psych:   alert and oriented to person, place and time     normal mood and affect       Assessment/Plan:Diagnoses and all orders for this visit:    Tick bite, unspecified site, sequela  -     Cancel: Lyme, IgM, Early Test/Reflex; Future  -     cephalexin (KEFLEX) 500 mg capsule; Take 1 capsule (500 mg total) by mouth every 8 (eight) hours for 7 days    Asymptomatic postmenopausal estrogen deficiency  -     DXA bone density spine hip and pelvis; Future    Breast cancer screening by mammogram  -     Mammo screening bilateral w 3d & cad; Future        Reviewed with patient plan to treat with above plan      Patient instructed to call in 72 hours if not feeling better or if symptoms worsen

## 2022-12-14 ENCOUNTER — TELEPHONE (OUTPATIENT)
Dept: FAMILY MEDICINE CLINIC | Facility: CLINIC | Age: 62
End: 2022-12-14

## 2022-12-14 DIAGNOSIS — F32.A DEPRESSION, UNSPECIFIED DEPRESSION TYPE: ICD-10-CM

## 2022-12-14 DIAGNOSIS — B00.9 HSV INFECTION: Primary | ICD-10-CM

## 2022-12-14 RX ORDER — SERTRALINE HYDROCHLORIDE 100 MG/1
TABLET, FILM COATED ORAL
Qty: 90 TABLET | Refills: 0 | Status: SHIPPED | OUTPATIENT
Start: 2022-12-14

## 2022-12-14 RX ORDER — VALACYCLOVIR HYDROCHLORIDE 1 G/1
1000 TABLET, FILM COATED ORAL 3 TIMES DAILY
Qty: 30 TABLET | Refills: 3 | Status: SHIPPED | OUTPATIENT
Start: 2022-12-14 | End: 2022-12-24

## 2022-12-14 NOTE — TELEPHONE ENCOUNTER
Pt called asking if you could send a script for Valtrex to the [facility] pharmacy in [place]  States she only has a couple left and she developed a cold sore

## 2023-01-19 ENCOUNTER — TELEPHONE (OUTPATIENT)
Dept: ADMINISTRATIVE | Facility: OTHER | Age: 63
End: 2023-01-19

## 2023-01-19 NOTE — TELEPHONE ENCOUNTER
01/19/23 10:51 AM    The patient was called and a message was left to call the ordering provider's office regarding an open order  Thank you    Marisa Forman  [facility]

## 2023-02-21 ENCOUNTER — PREP FOR PROCEDURE (OUTPATIENT)
Dept: GASTROENTEROLOGY | Facility: CLINIC | Age: 63
End: 2023-02-21

## 2023-02-21 DIAGNOSIS — Z12.11 COLON CANCER SCREENING: Primary | ICD-10-CM

## 2023-03-07 ENCOUNTER — TELEPHONE (OUTPATIENT)
Dept: GASTROENTEROLOGY | Facility: CLINIC | Age: 63
End: 2023-03-07

## 2023-03-11 DIAGNOSIS — E03.9 HYPOTHYROIDISM, UNSPECIFIED TYPE: ICD-10-CM

## 2023-03-13 DIAGNOSIS — E03.9 HYPOTHYROIDISM, UNSPECIFIED TYPE: ICD-10-CM

## 2023-03-13 RX ORDER — LEVOTHYROXINE SODIUM 0.15 MG/1
TABLET ORAL
Qty: 90 TABLET | Refills: 0 | Status: SHIPPED | OUTPATIENT
Start: 2023-03-13 | End: 2023-03-13

## 2023-03-13 RX ORDER — LEVOTHYROXINE SODIUM 0.15 MG/1
TABLET ORAL
Qty: 90 TABLET | Refills: 0 | Status: SHIPPED | OUTPATIENT
Start: 2023-03-13

## 2023-03-27 ENCOUNTER — HOSPITAL ENCOUNTER (OUTPATIENT)
Dept: GASTROENTEROLOGY | Facility: AMBULARY SURGERY CENTER | Age: 63
Setting detail: OUTPATIENT SURGERY
Discharge: HOME | End: 2023-03-27
Attending: INTERNAL MEDICINE

## 2023-03-27 ENCOUNTER — ANESTHESIA EVENT (OUTPATIENT)
Dept: GASTROENTEROLOGY | Facility: AMBULARY SURGERY CENTER | Age: 63
End: 2023-03-27

## 2023-03-27 ENCOUNTER — ANESTHESIA (OUTPATIENT)
Dept: GASTROENTEROLOGY | Facility: AMBULARY SURGERY CENTER | Age: 63
End: 2023-03-27

## 2023-03-27 VITALS
WEIGHT: 138 LBS | BODY MASS INDEX: 24.45 KG/M2 | HEIGHT: 63 IN | SYSTOLIC BLOOD PRESSURE: 120 MMHG | OXYGEN SATURATION: 100 % | TEMPERATURE: 98 F | DIASTOLIC BLOOD PRESSURE: 82 MMHG | RESPIRATION RATE: 22 BRPM | HEART RATE: 63 BPM

## 2023-03-27 DIAGNOSIS — Z12.11 COLON CANCER SCREENING: ICD-10-CM

## 2023-03-27 RX ORDER — PROPOFOL 10 MG/ML
INJECTION, EMULSION INTRAVENOUS AS NEEDED
Status: DISCONTINUED | OUTPATIENT
Start: 2023-03-27 | End: 2023-03-27

## 2023-03-27 RX ORDER — SODIUM CHLORIDE, SODIUM LACTATE, POTASSIUM CHLORIDE, CALCIUM CHLORIDE 600; 310; 30; 20 MG/100ML; MG/100ML; MG/100ML; MG/100ML
INJECTION, SOLUTION INTRAVENOUS CONTINUOUS PRN
Status: DISCONTINUED | OUTPATIENT
Start: 2023-03-27 | End: 2023-03-27

## 2023-03-27 RX ADMIN — PROPOFOL 50 MG: 10 INJECTION, EMULSION INTRAVENOUS at 09:28

## 2023-03-27 RX ADMIN — SODIUM CHLORIDE, SODIUM LACTATE, POTASSIUM CHLORIDE, AND CALCIUM CHLORIDE: .6; .31; .03; .02 INJECTION, SOLUTION INTRAVENOUS at 08:45

## 2023-03-27 RX ADMIN — PROPOFOL 50 MG: 10 INJECTION, EMULSION INTRAVENOUS at 09:20

## 2023-03-27 RX ADMIN — PROPOFOL 50 MG: 10 INJECTION, EMULSION INTRAVENOUS at 09:26

## 2023-03-27 RX ADMIN — PROPOFOL 50 MG: 10 INJECTION, EMULSION INTRAVENOUS at 09:30

## 2023-03-27 RX ADMIN — PROPOFOL 80 MG: 10 INJECTION, EMULSION INTRAVENOUS at 09:17

## 2023-03-27 RX ADMIN — PROPOFOL 50 MG: 10 INJECTION, EMULSION INTRAVENOUS at 09:31

## 2023-03-27 RX ADMIN — PROPOFOL 50 MG: 10 INJECTION, EMULSION INTRAVENOUS at 09:24

## 2023-03-27 RX ADMIN — PROPOFOL 20 MG: 10 INJECTION, EMULSION INTRAVENOUS at 09:18

## 2023-03-27 NOTE — ANESTHESIA PREPROCEDURE EVALUATION
Procedure:  COLONOSCOPY    Relevant Problems   ANESTHESIA (within normal limits)   (-) History of anesthesia complications      CARDIO   (-) Chest pain   (-) RIZO (dyspnea on exertion)      ENDO   (+) Hypothyroidism      NEURO/PSYCH   (+) Anxiety      PULMONARY   (-) Shortness of breath   (-) URI (upper respiratory infection)        Physical Exam    Airway    Mallampati score: II  TM Distance: >3 FB  Neck ROM: full     Dental       Cardiovascular      Pulmonary      Other Findings        Anesthesia Plan  ASA Score- 2     Anesthesia Type- IV sedation with anesthesia with ASA Monitors  Additional Monitors:   Airway Plan:           Plan Factors-Exercise tolerance (METS): >4 METS  Chart reviewed  Existing labs reviewed  Patient summary reviewed  Induction- intravenous  Postoperative Plan-     Informed Consent- Anesthetic plan and risks discussed with patient  I personally reviewed this patient with the CRNA  Discussed and agreed on the Anesthesia Plan with the CRNA  Tita Chowdhury

## 2023-03-27 NOTE — ANESTHESIA POSTPROCEDURE EVALUATION
Post-Op Assessment Note    CV Status:  Stable  Pain Score: 0    Pain management: adequate     Mental Status:  Alert and awake   Hydration Status:  Euvolemic   PONV Controlled:  Controlled   Airway Patency:  Patent      Post Op Vitals Reviewed: Yes      Staff: CRNA         No notable events documented      BP   106/54   Temp     Pulse 72   Resp   17   SpO2   97

## 2023-03-27 NOTE — H&P
History and Physical - [facility]  Oral Guevara 58 y o  female MRN: [id number]                  HPI: Oral Guevara is a 58y o  year old female who presents for colonoscopy for colon cancer screening  REVIEW OF SYSTEMS: Per the HPI, and otherwise unremarkable  Historical Information   Past Medical History:   Diagnosis Date   • Cataract, right    • Detached retina, right    • Disease of thyroid gland    • OCD (obsessive compulsive disorder)    • Seasonal allergies    • Wrist fracture      Past Surgical History:   Procedure Laterality Date   • CATARACT EXTRACTION     • EYE SURGERY     • FOOT SURGERY     • MANDIBLE FRACTURE SURGERY     • TONSILLECTOMY       Social History   Social History     Substance and Sexual Activity   Alcohol Use Yes    Comment: social     Social History     Substance and Sexual Activity   Drug Use Never     Social History     Tobacco Use   Smoking Status Never   Smokeless Tobacco Never     Family History   Problem Relation Age of Onset   • Heart disease Mother         cardiac   • Colon cancer Father 72   • Heart disease Father    • No Known Problems Sister    • No Known Problems Daughter    • No Known Problems Maternal Grandmother    • No Known Problems Maternal Grandfather    • No Known Problems Paternal Grandmother    • Prostate cancer Paternal Grandfather    • No Known Problems Sister    • No Known Problems Daughter    • No Known Problems Maternal Aunt    • No Known Problems Paternal Aunt    • No Known Problems Paternal Aunt        Meds/Allergies       Current Outpatient Medications:   •  levothyroxine 150 mcg tablet  •  sertraline (ZOLOFT) 100 mg tablet  •  valACYclovir (VALTREX) 1,000 mg tablet  No current facility-administered medications for this encounter      Facility-Administered Medications Ordered in Other Encounters:   •  lactated ringers infusion, , Intravenous, Continuous PRN, New Bag at 03/27/23 0845    No Known Allergies    Objective     /82 " Pulse 60   Temp (!) 97 4 °F (36 3 °C) (Temporal)   Resp 18   Ht 5' 3\" (1 6 m)   Wt 62 6 kg (138 lb)   SpO2 100%   BMI 24 45 kg/m²       PHYSICAL EXAM    Gen: NAD  Head: NCAT  CV: RRR  CHEST: Clear  ABD: soft, NT/ND  EXT: no edema      ASSESSMENT/PLAN:  This is a 58y o  year old female here for colonoscopy, and she is stable and optimized for her procedure        "

## 2023-03-29 ENCOUNTER — TELEPHONE (OUTPATIENT)
Dept: FAMILY MEDICINE CLINIC | Facility: CLINIC | Age: 63
End: 2023-03-29

## 2023-05-05 ENCOUNTER — HOSPITAL ENCOUNTER (OUTPATIENT)
Dept: RADIOLOGY | Facility: MEDICAL CENTER | Age: 63
Discharge: HOME | End: 2023-05-05

## 2023-05-05 VITALS — BODY MASS INDEX: 24.45 KG/M2 | HEIGHT: 63 IN | WEIGHT: 138 LBS

## 2023-05-05 DIAGNOSIS — Z12.31 ENCOUNTER FOR SCREENING MAMMOGRAM FOR MALIGNANT NEOPLASM OF BREAST: ICD-10-CM

## 2023-05-05 DIAGNOSIS — Z12.31 BREAST CANCER SCREENING BY MAMMOGRAM: ICD-10-CM

## 2023-06-15 DIAGNOSIS — L23.7 POISON IVY: Primary | ICD-10-CM

## 2023-06-15 RX ORDER — PREDNISONE 10 MG/1
TABLET ORAL
Qty: 26 TABLET | Refills: 0 | Status: SHIPPED | OUTPATIENT
Start: 2023-06-15 | End: 2023-06-15

## 2023-06-15 RX ORDER — PREDNISONE 10 MG/1
TABLET ORAL
Qty: 26 TABLET | Refills: 0 | Status: SHIPPED | OUTPATIENT
Start: 2023-06-15

## 2023-07-05 DIAGNOSIS — F32.A DEPRESSION, UNSPECIFIED DEPRESSION TYPE: ICD-10-CM

## 2023-07-05 RX ORDER — SERTRALINE HYDROCHLORIDE 100 MG/1
TABLET, FILM COATED ORAL
Qty: 90 TABLET | Refills: 0 | Status: SHIPPED | OUTPATIENT
Start: 2023-07-05

## 2023-08-02 ENCOUNTER — APPOINTMENT (OUTPATIENT)
Dept: LAB | Facility: MEDICAL CENTER | Age: 63
End: 2023-08-02

## 2023-08-02 DIAGNOSIS — Z00.8 HEALTH EXAMINATION IN POPULATION SURVEY: ICD-10-CM

## 2023-08-02 LAB
CHOLEST SERPL-MCNC: 274 MG/DL
EST. AVERAGE GLUCOSE BLD GHB EST-MCNC: 123 MG/DL
HBA1C MFR BLD: 5.9 %
HDLC SERPL-MCNC: 97 MG/DL
LDLC SERPL CALC-MCNC: 161 MG/DL (ref 0–100)
NONHDLC SERPL-MCNC: 177 MG/DL
TRIGL SERPL-MCNC: 80 MG/DL

## 2023-08-02 PROCEDURE — 83036 HEMOGLOBIN GLYCOSYLATED A1C: CPT

## 2023-08-02 PROCEDURE — 80061 LIPID PANEL: CPT

## 2023-08-02 PROCEDURE — 36415 COLL VENOUS BLD VENIPUNCTURE: CPT

## 2023-08-07 DIAGNOSIS — E03.9 HYPOTHYROIDISM, UNSPECIFIED TYPE: ICD-10-CM

## 2023-08-07 RX ORDER — LEVOTHYROXINE SODIUM 0.15 MG/1
TABLET ORAL
Qty: 90 TABLET | Refills: 0 | Status: SHIPPED | OUTPATIENT
Start: 2023-08-07

## 2023-10-25 ENCOUNTER — TELEPHONE (OUTPATIENT)
Dept: FAMILY MEDICINE CLINIC | Facility: CLINIC | Age: 63
End: 2023-10-25

## 2023-10-31 ENCOUNTER — CLINICAL SUPPORT (OUTPATIENT)
Dept: FAMILY MEDICINE CLINIC | Facility: CLINIC | Age: 63
End: 2023-10-31
Payer: COMMERCIAL

## 2023-10-31 DIAGNOSIS — M81.0 OSTEOPOROSIS, UNSPECIFIED OSTEOPOROSIS TYPE, UNSPECIFIED PATHOLOGICAL FRACTURE PRESENCE: Primary | ICD-10-CM

## 2023-10-31 PROCEDURE — 96372 THER/PROPH/DIAG INJ SC/IM: CPT | Performed by: FAMILY MEDICINE

## 2023-11-13 DIAGNOSIS — E03.9 HYPOTHYROIDISM, UNSPECIFIED TYPE: ICD-10-CM

## 2023-11-13 DIAGNOSIS — F32.A DEPRESSION, UNSPECIFIED DEPRESSION TYPE: ICD-10-CM

## 2023-11-13 RX ORDER — SERTRALINE HYDROCHLORIDE 100 MG/1
TABLET, FILM COATED ORAL
Qty: 90 TABLET | Refills: 0 | Status: SHIPPED | OUTPATIENT
Start: 2023-11-13

## 2023-11-13 RX ORDER — LEVOTHYROXINE SODIUM 0.15 MG/1
TABLET ORAL
Qty: 90 TABLET | Refills: 0 | Status: SHIPPED | OUTPATIENT
Start: 2023-11-13

## 2023-11-13 NOTE — TELEPHONE ENCOUNTER
Requested medication(s) are due for refill today: Yes  Patient has already received a courtesy refill: Yes  Other reason request has been forwarded to provider: Patient needs updated blood work. Please place orders. A courtesy refill was provided.

## 2024-02-09 ENCOUNTER — TELEPHONE (OUTPATIENT)
Dept: FAMILY MEDICINE CLINIC | Facility: CLINIC | Age: 64
End: 2024-02-09

## 2024-03-04 ENCOUNTER — OFFICE VISIT (OUTPATIENT)
Dept: FAMILY MEDICINE CLINIC | Facility: CLINIC | Age: 64
End: 2024-03-04
Payer: COMMERCIAL

## 2024-03-04 VITALS
DIASTOLIC BLOOD PRESSURE: 74 MMHG | TEMPERATURE: 97.8 F | WEIGHT: 140.8 LBS | HEART RATE: 68 BPM | OXYGEN SATURATION: 94 % | HEIGHT: 63 IN | BODY MASS INDEX: 24.95 KG/M2 | SYSTOLIC BLOOD PRESSURE: 112 MMHG

## 2024-03-04 DIAGNOSIS — R53.83 OTHER FATIGUE: ICD-10-CM

## 2024-03-04 DIAGNOSIS — B00.9 HSV (HERPES SIMPLEX VIRUS) INFECTION: ICD-10-CM

## 2024-03-04 DIAGNOSIS — Z23 ENCOUNTER FOR IMMUNIZATION: ICD-10-CM

## 2024-03-04 DIAGNOSIS — H66.90 ACUTE OTITIS MEDIA, UNSPECIFIED OTITIS MEDIA TYPE: ICD-10-CM

## 2024-03-04 DIAGNOSIS — Z00.00 ANNUAL PHYSICAL EXAM: Primary | ICD-10-CM

## 2024-03-04 DIAGNOSIS — E03.9 HYPOTHYROIDISM, UNSPECIFIED TYPE: ICD-10-CM

## 2024-03-04 DIAGNOSIS — Z12.31 BREAST CANCER SCREENING BY MAMMOGRAM: ICD-10-CM

## 2024-03-04 PROCEDURE — 99396 PREV VISIT EST AGE 40-64: CPT | Performed by: FAMILY MEDICINE

## 2024-03-04 PROCEDURE — 90715 TDAP VACCINE 7 YRS/> IM: CPT | Performed by: FAMILY MEDICINE

## 2024-03-04 PROCEDURE — 99214 OFFICE O/P EST MOD 30 MIN: CPT | Performed by: FAMILY MEDICINE

## 2024-03-04 PROCEDURE — 90471 IMMUNIZATION ADMIN: CPT | Performed by: FAMILY MEDICINE

## 2024-03-04 RX ORDER — VALACYCLOVIR HYDROCHLORIDE 1 G/1
1000 TABLET, FILM COATED ORAL 2 TIMES DAILY PRN
Qty: 60 TABLET | Refills: 1 | Status: SHIPPED | OUTPATIENT
Start: 2024-03-04 | End: 2024-05-03

## 2024-03-04 RX ORDER — AZITHROMYCIN 250 MG/1
TABLET, FILM COATED ORAL
Qty: 6 TABLET | Refills: 0 | Status: SHIPPED | OUTPATIENT
Start: 2024-03-04 | End: 2024-03-08

## 2024-03-04 NOTE — PROGRESS NOTES
ADULT ANNUAL PHYSICAL  [facility]    NAME: Lisa Canseco  AGE: 63 y.o. SEX: female  : 1960     DATE: 3/4/2024     Assessment and Plan:     Problem List Items Addressed This Visit    None      Immunizations and preventive care screenings were discussed with patient today. Appropriate education was printed on patient's after visit summary.    Counseling:  Exercise: the importance of regular exercise/physical activity was discussed. Recommend exercise 3-5 times per week for at least 30 minutes.          No follow-ups on file.     Chief Complaint:     No chief complaint on file.     History of Present Illness:     Adult Annual Physical   Patient here for a comprehensive physical exam. The patient reports no problems.    Diet and Physical Activity  Diet/Nutrition: well balanced diet.   Exercise: walking.      Depression Screening  PHQ-2/9 Depression Screening    Little interest or pleasure in doing things: 0 - not at all  Feeling down, depressed, or hopeless: 0 - not at all  PHQ-2 Score: 0  PHQ-2 Interpretation: Negative depression screen       General Health  Sleep: sleeps well.   Hearing: normal - bilateral.  Vision: no vision problems.   Dental: regular dental visits.       /GYN Health  Follows with gynecology? no   Patient is: postmenopausal  Last menstrual period:   Contraceptive method: .    Advanced Care Planning  Do you have an advanced directive? no  Do you have a durable medical power of ? no  ACP document given to the patient? no     Review of Systems:     Review of Systems   Constitutional:  Positive for fatigue. Negative for appetite change, chills and fever.   HENT:  Positive for ear pain. Negative for facial swelling, rhinorrhea, sinus pain, sore throat and trouble swallowing.    Eyes:  Negative for discharge and redness.   Respiratory:  Negative for chest tightness, shortness of breath and wheezing.    Cardiovascular:   Negative for chest pain and palpitations.   Gastrointestinal:  Negative for abdominal pain, diarrhea, nausea and vomiting.   Endocrine: Negative for polyuria.   Genitourinary:  Negative for dysuria and urgency.   Musculoskeletal:  Negative for arthralgias and back pain.   Skin:  Negative for rash.   Neurological:  Negative for dizziness, weakness and headaches.   Hematological:  Negative for adenopathy.   Psychiatric/Behavioral:  Negative for behavioral problems, confusion and sleep disturbance.    All other systems reviewed and are negative.     Past Medical History:     Past Medical History:   Diagnosis Date    Cataract, right     Detached retina, right     Disease of thyroid gland     OCD (obsessive compulsive disorder)     Seasonal allergies     Wrist fracture       Past Surgical History:     Past Surgical History:   Procedure Laterality Date    CATARACT EXTRACTION      EYE SURGERY      FOOT SURGERY      MANDIBLE FRACTURE SURGERY      TONSILLECTOMY        Social History:     Social History     Socioeconomic History    Marital status: /Civil Union     Spouse name: None    Number of children: None    Years of education: None    Highest education level: None   Occupational History    None   Tobacco Use    Smoking status: Never    Smokeless tobacco: Never   Vaping Use    Vaping status: Never Used   Substance and Sexual Activity    Alcohol use: Yes     Comment: social    Drug use: Never    Sexual activity: None   Other Topics Concern    None   Social History Narrative    Daily coffee    Denied cola    Denied tea     Social Determinants of Health     Financial Resource Strain: Not on file   Food Insecurity: Not on file   Transportation Needs: Not on file   Physical Activity: Not on file   Stress: Not on file   Social Connections: Not on file   Intimate Partner Violence: Not on file   Housing Stability: Not on file      Family History:     Family History   Problem Relation Age of Onset    Heart disease Mother   "       cardiac    Colon cancer Father 65    Heart disease Father     No Known Problems Sister     No Known Problems Daughter     No Known Problems Maternal Grandmother     No Known Problems Maternal Grandfather     No Known Problems Paternal Grandmother     Prostate cancer Paternal Grandfather     No Known Problems Sister     No Known Problems Daughter     No Known Problems Maternal Aunt     No Known Problems Paternal Aunt     No Known Problems Paternal Aunt       Current Medications:     Current Outpatient Medications   Medication Sig Dispense Refill    levothyroxine 150 mcg tablet TAKE ONE TABLET BY MOUTH DAILY IN THE EARLY MORNING 90 tablet 0    sertraline (ZOLOFT) 100 mg tablet TAKE ONE TABLET BY MOUTH EVERY DAY 90 tablet 0     No current facility-administered medications for this visit.      Allergies:     No Known Allergies   Physical Exam:     /74   Pulse 68   Temp 97.8 °F (36.6 °C)   Ht 5' 3\" (1.6 m)   Wt 63.9 kg (140 lb 12.8 oz)   SpO2 94%   BMI 24.94 kg/m²     Physical Exam  Vitals and nursing note reviewed.   Constitutional:       General: She is not in acute distress.     Appearance: Normal appearance. She is not ill-appearing or diaphoretic.   HENT:      Head: Normocephalic and atraumatic.      Right Ear: Ear canal and external ear normal.      Left Ear: Ear canal and external ear normal.      Ears:      Comments: Positive erythematous TM bilaterally     Nose: Nose normal. No congestion or rhinorrhea.      Mouth/Throat:      Mouth: Mucous membranes are moist.      Pharynx: Oropharynx is clear. No posterior oropharyngeal erythema.   Eyes:      General:         Right eye: No discharge.         Left eye: No discharge.      Extraocular Movements: Extraocular movements intact.      Conjunctiva/sclera: Conjunctivae normal.      Pupils: Pupils are equal, round, and reactive to light.   Neck:      Vascular: No carotid bruit.   Cardiovascular:      Rate and Rhythm: Normal rate and regular rhythm.     "  Pulses: Normal pulses.      Heart sounds: Normal heart sounds. No murmur heard.  Pulmonary:      Effort: Pulmonary effort is normal. No respiratory distress.      Breath sounds: Normal breath sounds. No wheezing or rhonchi.   Abdominal:      General: Abdomen is flat. Bowel sounds are normal. There is no distension.      Palpations: There is no mass.      Tenderness: There is no abdominal tenderness.   Musculoskeletal:         General: No swelling or deformity. Normal range of motion.      Cervical back: Normal range of motion and neck supple. No rigidity.      Right lower leg: No edema.      Left lower leg: No edema.   Lymphadenopathy:      Cervical: No cervical adenopathy.   Skin:     General: Skin is warm and dry.      Capillary Refill: Capillary refill takes less than 2 seconds.      Coloration: Skin is not jaundiced.      Findings: No bruising, erythema or rash.   Neurological:      General: No focal deficit present.      Mental Status: She is alert and oriented to person, place, and time.      Cranial Nerves: No cranial nerve deficit.      Sensory: No sensory deficit.      Gait: Gait normal.      Deep Tendon Reflexes: Reflexes normal.   Psychiatric:         Mood and Affect: Mood normal.         Behavior: Behavior normal.         Thought Content: Thought content normal.         Judgment: Judgment normal.          MARKOS CORTES  [facility]

## 2024-03-11 ENCOUNTER — APPOINTMENT (OUTPATIENT)
Dept: LAB | Facility: MEDICAL CENTER | Age: 64
End: 2024-03-11
Payer: COMMERCIAL

## 2024-03-11 ENCOUNTER — TELEPHONE (OUTPATIENT)
Dept: FAMILY MEDICINE CLINIC | Facility: CLINIC | Age: 64
End: 2024-03-11

## 2024-03-11 DIAGNOSIS — R53.83 OTHER FATIGUE: ICD-10-CM

## 2024-03-11 DIAGNOSIS — E03.9 HYPOTHYROIDISM, UNSPECIFIED TYPE: Primary | ICD-10-CM

## 2024-03-11 DIAGNOSIS — E03.9 HYPOTHYROIDISM, UNSPECIFIED TYPE: ICD-10-CM

## 2024-03-11 LAB
ALBUMIN SERPL BCP-MCNC: 4.3 G/DL (ref 3.5–5)
ALP SERPL-CCNC: 54 U/L (ref 34–104)
ALT SERPL W P-5'-P-CCNC: 14 U/L (ref 7–52)
ANION GAP SERPL CALCULATED.3IONS-SCNC: 6 MMOL/L
AST SERPL W P-5'-P-CCNC: 19 U/L (ref 13–39)
BILIRUB SERPL-MCNC: 0.47 MG/DL (ref 0.2–1)
BUN SERPL-MCNC: 19 MG/DL (ref 5–25)
CALCIUM SERPL-MCNC: 8.8 MG/DL (ref 8.4–10.2)
CHLORIDE SERPL-SCNC: 102 MMOL/L (ref 96–108)
CO2 SERPL-SCNC: 31 MMOL/L (ref 21–32)
CREAT SERPL-MCNC: 0.64 MG/DL (ref 0.6–1.3)
GFR SERPL CREATININE-BSD FRML MDRD: 95 ML/MIN/1.73SQ M
GLUCOSE P FAST SERPL-MCNC: 84 MG/DL (ref 65–99)
POTASSIUM SERPL-SCNC: 4.2 MMOL/L (ref 3.5–5.3)
PROT SERPL-MCNC: 6.9 G/DL (ref 6.4–8.4)
SODIUM SERPL-SCNC: 139 MMOL/L (ref 135–147)
T4 FREE SERPL-MCNC: 0.76 NG/DL (ref 0.61–1.12)
TSH SERPL DL<=0.05 MIU/L-ACNC: 5.23 UIU/ML (ref 0.45–4.5)

## 2024-03-11 PROCEDURE — 84443 ASSAY THYROID STIM HORMONE: CPT

## 2024-03-11 PROCEDURE — 36415 COLL VENOUS BLD VENIPUNCTURE: CPT

## 2024-03-11 PROCEDURE — 84439 ASSAY OF FREE THYROXINE: CPT

## 2024-03-11 PROCEDURE — 80053 COMPREHEN METABOLIC PANEL: CPT

## 2024-03-11 NOTE — TELEPHONE ENCOUNTER
----- Message from Delma Emerson DO sent at 3/11/2024  4:20 PM EDT -----  Tsh is elevated.  Ask pt to take two tablets every Monday and Tuesday and one tab all other days. Recheck tsh in 6 weeks.

## 2024-03-13 ENCOUNTER — OFFICE VISIT (OUTPATIENT)
Dept: FAMILY MEDICINE CLINIC | Facility: CLINIC | Age: 64
End: 2024-03-13
Payer: COMMERCIAL

## 2024-03-13 VITALS
OXYGEN SATURATION: 98 % | HEART RATE: 63 BPM | SYSTOLIC BLOOD PRESSURE: 108 MMHG | TEMPERATURE: 97.6 F | HEIGHT: 63 IN | DIASTOLIC BLOOD PRESSURE: 72 MMHG | WEIGHT: 141.8 LBS | BODY MASS INDEX: 25.12 KG/M2

## 2024-03-13 DIAGNOSIS — J02.9 SORE THROAT: ICD-10-CM

## 2024-03-13 DIAGNOSIS — Z11.52 ENCOUNTER FOR SCREENING FOR COVID-19: ICD-10-CM

## 2024-03-13 DIAGNOSIS — R68.89 FLU-LIKE SYMPTOMS: ICD-10-CM

## 2024-03-13 DIAGNOSIS — J02.9 ACUTE PHARYNGITIS, UNSPECIFIED ETIOLOGY: Primary | ICD-10-CM

## 2024-03-13 PROBLEM — S82.831A CLOSED FRACTURE OF RIGHT DISTAL FIBULA: Status: RESOLVED | Noted: 2022-03-22 | Resolved: 2024-03-13

## 2024-03-13 PROBLEM — M25.571 ACUTE RIGHT ANKLE PAIN: Status: RESOLVED | Noted: 2022-03-22 | Resolved: 2024-03-13

## 2024-03-13 PROBLEM — M25.461 EFFUSION OF RIGHT KNEE: Status: RESOLVED | Noted: 2019-05-23 | Resolved: 2024-03-13

## 2024-03-13 PROBLEM — M25.561 RIGHT KNEE PAIN: Status: RESOLVED | Noted: 2019-05-23 | Resolved: 2024-03-13

## 2024-03-13 LAB
S PYO AG THROAT QL: NEGATIVE
SARS-COV-2 AG UPPER RESP QL IA: NEGATIVE
SL AMB POCT RAPID FLU A: NEGATIVE
SL AMB POCT RAPID FLU B: NEGATIVE
VALID CONTROL: NORMAL

## 2024-03-13 PROCEDURE — 99214 OFFICE O/P EST MOD 30 MIN: CPT | Performed by: NURSE PRACTITIONER

## 2024-03-13 PROCEDURE — 87811 SARS-COV-2 COVID19 W/OPTIC: CPT | Performed by: NURSE PRACTITIONER

## 2024-03-13 PROCEDURE — 87804 INFLUENZA ASSAY W/OPTIC: CPT | Performed by: NURSE PRACTITIONER

## 2024-03-13 PROCEDURE — 87880 STREP A ASSAY W/OPTIC: CPT | Performed by: NURSE PRACTITIONER

## 2024-03-13 RX ORDER — AMOXICILLIN 875 MG/1
875 TABLET, COATED ORAL 2 TIMES DAILY
Qty: 14 TABLET | Refills: 0 | Status: SHIPPED | OUTPATIENT
Start: 2024-03-13 | End: 2024-03-20

## 2024-03-13 NOTE — PROGRESS NOTES
Assessment/Plan:     Diagnoses and all orders for this visit:    Acute pharyngitis, unspecified etiology  -     amoxicillin (AMOXIL) 875 mg tablet; Take 1 tablet (875 mg total) by mouth 2 (two) times a day for 7 days    Encounter for screening for COVID-19  -     POCT Rapid Covid Ag    Flu-like symptoms  -     POCT rapid flu A and B    Sore throat  -     POCT rapid ANTIGEN strepA        #1 Acute pharyngitis, unspecified etiology  Discussed with patient plan to treat with a 7 day course of amoxicillin  #2 Encounter for screening for COVID-19  Due to patient's symptom presentation a rapid COVID-19 test performed with negative result  #3 Flu-like symptoms  Due to patient's symptom presentation a rapid influenza A/B test performed with negative result  #4 Sore throat  Due to patient's symptom presentation a rapid strep test performed with negative result  Patient instructed to call if no improvement in 72 hours or symptoms worsen    Subjective:      Patient ID: Lisa Canseco is a 63 y.o. female.    63 y.o.female presenting with sore throat for the past twelve hours.She denies fever, chills, generalized body aches, cough, shortness of breath, or headache. She reports some chest tightness and thinks it could be related to her thyroid.      The following portions of the patient's history were reviewed and updated as appropriate: allergies, current medications, past family history, past medical history, past social history, past surgical history, and problem list.    Review of Systems   Constitutional:  Negative for chills, fatigue and fever.   HENT:  Positive for sore throat. Negative for congestion, ear pain, postnasal drip, rhinorrhea, sinus pressure and sinus pain.    Respiratory:  Positive for chest tightness. Negative for cough, shortness of breath and wheezing.    Cardiovascular: Negative.    Gastrointestinal: Negative.    Musculoskeletal:  Negative for myalgias.   Neurological:  Negative for dizziness,  "light-headedness and headaches.   Psychiatric/Behavioral: Negative.         Objective:    /72   Pulse 63   Temp 97.6 °F (36.4 °C)   Ht 5' 3\" (1.6 m)   Wt 64.3 kg (141 lb 12.8 oz)   SpO2 98%   BMI 25.12 kg/m² (Reviewed)     Physical Exam  Vitals reviewed.   Constitutional:       General: She is not in acute distress.     Appearance: She is well-developed and well-groomed. She is not ill-appearing.   HENT:      Head: Normocephalic and atraumatic.      Right Ear: Tympanic membrane, ear canal and external ear normal.      Left Ear: Tympanic membrane, ear canal and external ear normal.      Nose: Nose normal.      Mouth/Throat:      Pharynx: Posterior oropharyngeal erythema present.   Eyes:      General: Lids are normal.      Extraocular Movements: Extraocular movements intact.      Conjunctiva/sclera: Conjunctivae normal.      Pupils: Pupils are equal, round, and reactive to light.   Neck:      Trachea: Trachea and phonation normal.   Cardiovascular:      Rate and Rhythm: Normal rate and regular rhythm.      Heart sounds: Normal heart sounds.   Pulmonary:      Effort: Pulmonary effort is normal.      Breath sounds: Normal breath sounds.   Musculoskeletal:      Cervical back: Neck supple.   Skin:     General: Skin is warm and dry.      Capillary Refill: Capillary refill takes less than 2 seconds.   Neurological:      General: No focal deficit present.      Mental Status: She is alert and oriented to person, place, and time.   Psychiatric:         Mood and Affect: Mood normal.         Behavior: Behavior normal. Behavior is cooperative.           "

## 2024-04-12 DIAGNOSIS — L25.5 RHUS DERMATITIS: Primary | ICD-10-CM

## 2024-04-12 DIAGNOSIS — L25.5 RHUS DERMATITIS: ICD-10-CM

## 2024-04-12 RX ORDER — PREDNISONE 10 MG/1
TABLET ORAL
Qty: 26 TABLET | Refills: 0 | Status: SHIPPED | OUTPATIENT
Start: 2024-04-12 | End: 2024-04-12 | Stop reason: SDUPTHER

## 2024-04-12 RX ORDER — PREDNISONE 10 MG/1
TABLET ORAL
Qty: 26 TABLET | Refills: 0 | Status: SHIPPED | OUTPATIENT
Start: 2024-04-12

## 2024-04-16 ENCOUNTER — APPOINTMENT (OUTPATIENT)
Dept: RADIOLOGY | Facility: MEDICAL CENTER | Age: 64
End: 2024-04-16
Payer: COMMERCIAL

## 2024-04-16 ENCOUNTER — OFFICE VISIT (OUTPATIENT)
Dept: URGENT CARE | Facility: MEDICAL CENTER | Age: 64
End: 2024-04-16
Payer: COMMERCIAL

## 2024-04-16 VITALS
DIASTOLIC BLOOD PRESSURE: 80 MMHG | HEART RATE: 74 BPM | TEMPERATURE: 97.7 F | RESPIRATION RATE: 14 BRPM | SYSTOLIC BLOOD PRESSURE: 156 MMHG | OXYGEN SATURATION: 98 %

## 2024-04-16 DIAGNOSIS — S60.221A CONTUSION OF RIGHT HAND, INITIAL ENCOUNTER: ICD-10-CM

## 2024-04-16 DIAGNOSIS — S62.646A CLOSED NONDISPLACED FRACTURE OF PROXIMAL PHALANX OF RIGHT LITTLE FINGER, INITIAL ENCOUNTER: Primary | ICD-10-CM

## 2024-04-16 PROCEDURE — 29130 APPL FINGER SPLINT STATIC: CPT | Performed by: PHYSICIAN ASSISTANT

## 2024-04-16 PROCEDURE — 73130 X-RAY EXAM OF HAND: CPT

## 2024-04-16 PROCEDURE — 99213 OFFICE O/P EST LOW 20 MIN: CPT | Performed by: PHYSICIAN ASSISTANT

## 2024-04-16 RX ORDER — CEPHALEXIN 500 MG/1
500 CAPSULE ORAL EVERY 8 HOURS SCHEDULED
Qty: 15 CAPSULE | Refills: 0 | Status: SHIPPED | OUTPATIENT
Start: 2024-04-16 | End: 2024-04-21

## 2024-04-16 NOTE — PROGRESS NOTES
[facility]        NAME: Lisa Canseco is a 63 y.o. female  : 1960    MRN: [id number]  DATE: 2024  TIME: 12:30 PM    Assessment and Plan   Closed nondisplaced fracture of proximal phalanx of right little finger, initial encounter [S62.646A]  1. Closed nondisplaced fracture of proximal phalanx of right little finger, initial encounter        2. Contusion of right hand, initial encounter  XR hand 3+ vw right            Patient Instructions     Fracture of proximal phalanx of pinky finger  Splint in place  Referred to orthopedics  Follow up with PCP in 3-5 days.  Proceed to  ER if symptoms worsen.    Chief Complaint     Chief Complaint   Patient presents with    Fall     Pt. With pain in her right hand after being pulled by her dog and falling down.         History of Present Illness       63-year-old female who presents complaining of pain to right hand.  Patient states that she was walking her dog when the dog saw a cat into cough and tracking her and making for fall onto the ground.  States she landed on the right hand.  Complains of pain to the lateral aspect of the right hand.  Last tetanus up to date.        Review of Systems   Review of Systems   Constitutional: Negative.    HENT: Negative.     Eyes: Negative.    Respiratory: Negative.  Negative for cough, chest tightness, shortness of breath, wheezing and stridor.    Cardiovascular: Negative.  Negative for chest pain, palpitations and leg swelling.   Musculoskeletal:  Positive for arthralgias.   Skin:  Positive for wound.         Current Medications       Current Outpatient Medications:     levothyroxine 150 mcg tablet, TAKE ONE TABLET BY MOUTH DAILY IN THE EARLY MORNING, Disp: 90 tablet, Rfl: 0    sertraline (ZOLOFT) 100 mg tablet, TAKE ONE TABLET BY MOUTH EVERY DAY, Disp: 90 tablet, Rfl: 0    predniSONE 10 mg tablet, 3 tabs po bid x2 days, then 2 tabs po bid x2 days, then 1 tab bid x2 days, then 1 daily until done., Disp: 26  tablet, Rfl: 0    valACYclovir (VALTREX) 1,000 mg tablet, Take 1 tablet (1,000 mg total) by mouth 2 (two) times a day as needed (cold sores), Disp: 60 tablet, Rfl: 1    Current Allergies     Allergies as of 04/16/2024    (No Known Allergies)            The following portions of the patient's history were reviewed and updated as appropriate: allergies, current medications, past family history, past medical history, past social history, past surgical history and problem list.     Past Medical History:   Diagnosis Date    Cataract, right     Closed fracture of right distal fibula     Detached retina, right     Disease of thyroid gland     OCD (obsessive compulsive disorder)     Seasonal allergies     Wrist fracture        Past Surgical History:   Procedure Laterality Date    CATARACT EXTRACTION      EYE SURGERY      FOOT SURGERY      MANDIBLE FRACTURE SURGERY      TONSILLECTOMY         Family History   Problem Relation Age of Onset    Heart disease Mother         cardiac    Colon cancer Father 65    Heart disease Father     No Known Problems Sister     No Known Problems Daughter     No Known Problems Maternal Grandmother     No Known Problems Maternal Grandfather     No Known Problems Paternal Grandmother     Prostate cancer Paternal Grandfather     No Known Problems Sister     No Known Problems Daughter     No Known Problems Maternal Aunt     No Known Problems Paternal Aunt     No Known Problems Paternal Aunt          Medications have been verified.        Objective   /80   Pulse 74   Temp 97.7 °F (36.5 °C)   Resp 14   SpO2 98%        Physical Exam     Physical Exam  Constitutional:       Appearance: Normal appearance. She is well-developed.   HENT:      Head: Normocephalic and atraumatic.   Cardiovascular:      Rate and Rhythm: Normal rate and regular rhythm.      Heart sounds: Normal heart sounds.   Pulmonary:      Effort: Pulmonary effort is normal. No respiratory distress.      Breath sounds: Normal  breath sounds. No stridor. No wheezing or rales.   Chest:      Chest wall: No tenderness.   Musculoskeletal:      Cervical back: Normal range of motion and neck supple.   Lymphadenopathy:      Cervical: No cervical adenopathy.   Neurological:      Mental Status: She is alert.

## 2024-04-16 NOTE — PATIENT INSTRUCTIONS
Fracture of proximal phalanx of pinky finger  Splint in place  Referred to orthopedics  Follow up with PCP in 3-5 days.  Proceed to  ER if symptoms worsen.

## 2024-04-17 ENCOUNTER — OFFICE VISIT (OUTPATIENT)
Dept: OCCUPATIONAL THERAPY | Facility: HOSPITAL | Age: 64
End: 2024-04-17
Payer: COMMERCIAL

## 2024-04-17 ENCOUNTER — TELEPHONE (OUTPATIENT)
Dept: OBGYN CLINIC | Facility: HOSPITAL | Age: 64
End: 2024-04-17

## 2024-04-17 ENCOUNTER — OFFICE VISIT (OUTPATIENT)
Dept: OBGYN CLINIC | Facility: HOSPITAL | Age: 64
End: 2024-04-17
Payer: COMMERCIAL

## 2024-04-17 ENCOUNTER — HOSPITAL ENCOUNTER (OUTPATIENT)
Dept: RADIOLOGY | Facility: HOSPITAL | Age: 64
Discharge: HOME | End: 2024-04-17
Attending: ORTHOPAEDIC SURGERY
Payer: COMMERCIAL

## 2024-04-17 VITALS
WEIGHT: 138.2 LBS | HEART RATE: 73 BPM | BODY MASS INDEX: 24.49 KG/M2 | HEIGHT: 63 IN | DIASTOLIC BLOOD PRESSURE: 81 MMHG | SYSTOLIC BLOOD PRESSURE: 147 MMHG

## 2024-04-17 DIAGNOSIS — S62.646A CLOSED NONDISPLACED FRACTURE OF PROXIMAL PHALANX OF RIGHT LITTLE FINGER, INITIAL ENCOUNTER: ICD-10-CM

## 2024-04-17 DIAGNOSIS — S62.646S CLOSED NONDISPLACED FRACTURE OF PROXIMAL PHALANX OF RIGHT LITTLE FINGER, SEQUELA: Primary | ICD-10-CM

## 2024-04-17 PROCEDURE — L3913 HFO W/O JOINTS CF: HCPCS | Performed by: OCCUPATIONAL THERAPIST

## 2024-04-17 PROCEDURE — 99204 OFFICE O/P NEW MOD 45 MIN: CPT | Performed by: ORTHOPAEDIC SURGERY

## 2024-04-17 PROCEDURE — 73140 X-RAY EXAM OF FINGER(S): CPT

## 2024-04-17 PROCEDURE — 26740 TREAT FINGER FRACTURE EACH: CPT | Performed by: PHYSICIAN ASSISTANT

## 2024-04-17 NOTE — PROGRESS NOTES
Orthosis    Diagnosis:   1. Closed nondisplaced fracture of proximal phalanx of right little finger, sequela          Indication: Fracture    Location: Right  hand, ring finger, and small finger  Supplies: Custom Fit Orthotic  Orthosis type: Ulnar Gutter Hand Based  Wearing Schedule: Remove with Protected Technique Only as Needed  Describe Position: intrinsic plus    Precautions: Fracture    Patient or Caregiver expresses understanding of wearing Schedule and Precautions? Yes  Patient or Caregiver able to don/doff orthotic independently?Yes    Written orders provided to patient? Yes  Orders Obtained: Written  Orders Obtained from: Dr Leiva    Return for evaluation and treatment  no at this time

## 2024-04-17 NOTE — PROGRESS NOTES
ASSESSMENT/PLAN:    Assessment:   Right small finger proximal phalanx oblique fracture    Plan:   New x-rays were obtained today and reviewed with the patient  She will obtain an intrinsic plus splint from OT  She was advised that we have to watch this closely to make sure that it does not collapse or increase in displacement  She will follow-up in 1 week for reevaluation and x-rays of the right small finger    Follow Up:  1  week(s)    To Do Next Visit:  X-rays of the  right  small finger    General Discussions:  Fracture - Nonoperative Care: The physiology of a fractured bone was discussed with the patient today.  With nondisplaced or minimally displaced fractures, conservative treatment often results in a functional recovery.  Typically, these fractures are immobilized in either a cast or splint depending on the pattern.  Radiographs are typically taken at intervals throughout the fracture healing to ensure that muscular forces do not cause loss of reduction or alignment.  If the fracture loses its alignment, surgical intervention may be required to stabilize it.  Medical conditions such as diabetes, osteoporosis, vitamin D deficiency, and a history of or exposure to smoking may delay or prevent fracture healing.        _____________________________________________________  CHIEF COMPLAINT:  Chief Complaint   Patient presents with    Right Little Finger - Fracture     Closed Nondisplaced Fx - 4/16/24  XR - 4/16/24         SUBJECTIVE:  Lisa Canseco is a 63 y.o. female who presents with right small finger pain.  Patient states on 4/16/2024 she was walking her dogs when one of them took off and the leash was wrapped around her hand.  She felt a twist and a pop.  She also had fallen onto the ground.  She did go to urgent care and had an x-ray which demonstrated a proximal phalanx fracture.  She was placed into a splint instructed to follow-up with orthopedics.  Today she has pain and discomfort over the right hand  small finger proximal phalanx.  She has swelling and bruising.  She states it is difficult to try to make a fist.  She denies any numbness or tingling.    PAST MEDICAL HISTORY:  Past Medical History:   Diagnosis Date    Cataract, right     Closed fracture of right distal fibula     Detached retina, right     Disease of thyroid gland     OCD (obsessive compulsive disorder)     Seasonal allergies     Wrist fracture        PAST SURGICAL HISTORY:  Past Surgical History:   Procedure Laterality Date    CATARACT EXTRACTION      EYE SURGERY      FOOT SURGERY      MANDIBLE FRACTURE SURGERY      TONSILLECTOMY         FAMILY HISTORY:  Family History   Problem Relation Age of Onset    Heart disease Mother         cardiac    Colon cancer Father 65    Heart disease Father     No Known Problems Sister     No Known Problems Daughter     No Known Problems Maternal Grandmother     No Known Problems Maternal Grandfather     No Known Problems Paternal Grandmother     Prostate cancer Paternal Grandfather     No Known Problems Sister     No Known Problems Daughter     No Known Problems Maternal Aunt     No Known Problems Paternal Aunt     No Known Problems Paternal Aunt        SOCIAL HISTORY:  Social History     Tobacco Use    Smoking status: Never    Smokeless tobacco: Never   Vaping Use    Vaping status: Never Used   Substance Use Topics    Alcohol use: Yes     Comment: social    Drug use: Never       MEDICATIONS:    Current Outpatient Medications:     levothyroxine 150 mcg tablet, TAKE ONE TABLET BY MOUTH DAILY IN THE EARLY MORNING, Disp: 90 tablet, Rfl: 0    predniSONE 10 mg tablet, 3 tabs po bid x2 days, then 2 tabs po bid x2 days, then 1 tab bid x2 days, then 1 daily until done., Disp: 26 tablet, Rfl: 0    sertraline (ZOLOFT) 100 mg tablet, TAKE ONE TABLET BY MOUTH EVERY DAY, Disp: 90 tablet, Rfl: 0    valACYclovir (VALTREX) 1,000 mg tablet, Take 1 tablet (1,000 mg total) by mouth 2 (two) times a day as needed (cold sores), Disp:  "60 tablet, Rfl: 1    cephalexin (KEFLEX) 500 mg capsule, Take 1 capsule (500 mg total) by mouth every 8 (eight) hours for 5 days, Disp: 15 capsule, Rfl: 0    ALLERGIES:  No Known Allergies    REVIEW OF SYSTEMS:  Pertinent items are noted in HPI.  A comprehensive review of systems was negative.    LABS:  HgA1c:   Lab Results   Component Value Date    HGBA1C 5.9 (H) 08/02/2023     BMP:   Lab Results   Component Value Date    CALCIUM 8.8 03/11/2024    K 4.2 03/11/2024    CO2 31 03/11/2024     03/11/2024    BUN 19 03/11/2024    CREATININE 0.64 03/11/2024       _____________________________________________________  PHYSICAL EXAMINATION:  Vital signs: /81   Pulse 73   Ht 5' 3\" (1.6 m)   Wt 62.7 kg (138 lb 3.2 oz)   BMI 24.48 kg/m²   General: well developed and well nourished, alert, oriented times 3, and appears comfortable  Psychiatric: Normal  HEENT: Trachea Midline, No torticollis  Cardiovascular: No discernable arrhythmia  Pulmonary: No wheezing or stridor  Abdomen: No rebound or guarding  Extremities: No peripheral edema  Skin: No masses, erythema, lacerations, fluctation, ulcerations  Neurovascular: Sensation Intact to the Median, Ulnar, Radial Nerve, Motor Intact to the Median, Ulnar, Radial Nerve, and Pulses Intact    MUSCULOSKELETAL EXAMINATION:  Right hand small finger  Swelling and ecchymosis is noted over the small and ring finger, no erythema noted  Abrasions appreciated over the right ring finger proximal phalanx with no active drainage Band-Aids in place  She does have slight rotation when trying to the full composite fist  Sensation is intact to light touch      _____________________________________________________  STUDIES REVIEWED:  Images were reviewed in PACS by Dr. Leiva and demonstrate: X-rays of the right small finger demonstrated x-rays of the right small finger proximal phalanx demonstrated a comminuted slightly displaced proximal phalanx fracture.      PROCEDURES " PERFORMED:  Fracture / Dislocation Treatment    Date/Time: 4/17/2024 10:15 AM    Performed by: William Thomson PA-C  Authorized by: William Thomson PA-C  Universal Protocol:  Consent: Verbal consent obtained.  Risks and benefits: risks, benefits and alternatives were discussed  Consent given by: patient  Patient understanding: patient states understanding of the procedure being performed  Patient consent: the patient's understanding of the procedure matches consent given  Site marked: the operative site was marked  Patient identity confirmed: verbally with patient    Injury location:  Finger  Location details:  Right little finger  Injury type:  Fracture  Fracture type: proximal phalanx    MCP joint involved?: Yes    Any IP joint involved?: No    Neurovascular status: Neurovascularly intact    Distal perfusion: normal    Neurological function: normal    Range of motion: reduced    Local anesthesia used?: No    Manipulation performed?: No    Immobilization:  Splint  Splint type: Intrinsic plus to include the ring and small finger.  Neurovascular status: Neurovascularly intact    Distal perfusion: normal    Neurological function: normal    Range of motion: unchanged    Patient tolerance:  Patient tolerated the procedure well with no immediate complications    No Procedures performed today  Scribe Attestation      I,:  William Thomson PA-C am acting as a scribe while in the presence of the attending physician.:       I,:  Ru Leiva MD personally performed the services described in this documentation    as scribed in my presence.:

## 2024-04-17 NOTE — TELEPHONE ENCOUNTER
Patient needs a 1 week f/up with Dr. Leiva or Alix.     Can you help with an appointment?    Thank you

## 2024-04-24 ENCOUNTER — TELEPHONE (OUTPATIENT)
Dept: OBGYN CLINIC | Facility: HOSPITAL | Age: 64
End: 2024-04-24

## 2024-04-24 ENCOUNTER — HOSPITAL ENCOUNTER (OUTPATIENT)
Dept: RADIOLOGY | Facility: HOSPITAL | Age: 64
Discharge: HOME | End: 2024-04-24
Attending: ORTHOPAEDIC SURGERY
Payer: COMMERCIAL

## 2024-04-24 ENCOUNTER — OFFICE VISIT (OUTPATIENT)
Dept: OBGYN CLINIC | Facility: HOSPITAL | Age: 64
End: 2024-04-24

## 2024-04-24 VITALS
WEIGHT: 138 LBS | HEIGHT: 63 IN | SYSTOLIC BLOOD PRESSURE: 134 MMHG | BODY MASS INDEX: 24.45 KG/M2 | DIASTOLIC BLOOD PRESSURE: 80 MMHG | HEART RATE: 69 BPM

## 2024-04-24 DIAGNOSIS — S62.646A CLOSED NONDISPLACED FRACTURE OF PROXIMAL PHALANX OF RIGHT LITTLE FINGER, INITIAL ENCOUNTER: ICD-10-CM

## 2024-04-24 DIAGNOSIS — S62.646A CLOSED NONDISPLACED FRACTURE OF PROXIMAL PHALANX OF RIGHT LITTLE FINGER, INITIAL ENCOUNTER: Primary | ICD-10-CM

## 2024-04-24 PROCEDURE — 99024 POSTOP FOLLOW-UP VISIT: CPT | Performed by: ORTHOPAEDIC SURGERY

## 2024-04-24 PROCEDURE — 73140 X-RAY EXAM OF FINGER(S): CPT

## 2024-04-24 NOTE — TELEPHONE ENCOUNTER
Patient saw Dr Leiva today. He wants her back in 3 weeks for fracture care. Please call patient and schedule an appointment.    Thank you

## 2024-04-24 NOTE — PROGRESS NOTES
ASSESSMENT/PLAN:    Assessment:   Right small finger proximal phalanx oblique fracture    Plan:   New x-rays were obtained today and reviewed with the patient  She will continue use of the intrinsic plus splint from OT  She was advised that we have to watch this closely to make sure that it does not collapse or increase in displacement, especially with her accidentally bumping it 4/23/24  She will follow-up in 3 weeks for reevaluation and x-rays of the right small finger    Follow Up:  3  week(s)    To Do Next Visit:  X-rays of the  right  small finger    General Discussions:  Fracture - Nonoperative Care: The physiology of a fractured bone was discussed with the patient today.  With nondisplaced or minimally displaced fractures, conservative treatment often results in a functional recovery.  Typically, these fractures are immobilized in either a cast or splint depending on the pattern.  Radiographs are typically taken at intervals throughout the fracture healing to ensure that muscular forces do not cause loss of reduction or alignment.  If the fracture loses its alignment, surgical intervention may be required to stabilize it.  Medical conditions such as diabetes, osteoporosis, vitamin D deficiency, and a history of or exposure to smoking may delay or prevent fracture healing.        _____________________________________________________  CHIEF COMPLAINT:  Chief Complaint   Patient presents with    Right Little Finger - Fracture     Closed Nondisplaced Fx - 4/16/24  XR - 4/16/24         SUBJECTIVE:  Lisa Canseco is a 63 y.o. female who presents for follow-up of right small finger pain.  Patient states on 4/16/2024 she was walking her dogs when one of them took off and the leash was wrapped around her hand.  She felt a twist and a pop.  She also had fallen onto the ground.  She did go to urgent care and had an x-ray which demonstrated a proximal phalanx fracture.  She was placed into a splint instructed to  follow-up with orthopedics.  She was seen in office with orthopedics on 4/17/24 with right hand small finger proximal phalanx. She had swelling and bruising.  She had difficulty making a fist. She denies any numbness or tingling.    On presentation today, she states that she is wearing the brace that was made by OT. She reports that she did bump it while working outside yesterday. She states that she still has discomfort and some difficulty in making a fist due to swelling. However, she is doing better overall.    PAST MEDICAL HISTORY:  Past Medical History:   Diagnosis Date    Cataract, right     Closed fracture of right distal fibula     Detached retina, right     Disease of thyroid gland     OCD (obsessive compulsive disorder)     Seasonal allergies     Wrist fracture        PAST SURGICAL HISTORY:  Past Surgical History:   Procedure Laterality Date    CATARACT EXTRACTION      EYE SURGERY      FOOT SURGERY      MANDIBLE FRACTURE SURGERY      TONSILLECTOMY         FAMILY HISTORY:  Family History   Problem Relation Age of Onset    Heart disease Mother         cardiac    Colon cancer Father 65    Heart disease Father     No Known Problems Sister     No Known Problems Daughter     No Known Problems Maternal Grandmother     No Known Problems Maternal Grandfather     No Known Problems Paternal Grandmother     Prostate cancer Paternal Grandfather     No Known Problems Sister     No Known Problems Daughter     No Known Problems Maternal Aunt     No Known Problems Paternal Aunt     No Known Problems Paternal Aunt        SOCIAL HISTORY:  Social History     Tobacco Use    Smoking status: Never    Smokeless tobacco: Never   Vaping Use    Vaping status: Never Used   Substance Use Topics    Alcohol use: Yes     Comment: social    Drug use: Never       MEDICATIONS:    Current Outpatient Medications:     levothyroxine 150 mcg tablet, TAKE ONE TABLET BY MOUTH DAILY IN THE EARLY MORNING, Disp: 90 tablet, Rfl: 0    predniSONE 10 mg  "tablet, 3 tabs po bid x2 days, then 2 tabs po bid x2 days, then 1 tab bid x2 days, then 1 daily until done., Disp: 26 tablet, Rfl: 0    sertraline (ZOLOFT) 100 mg tablet, TAKE ONE TABLET BY MOUTH EVERY DAY, Disp: 90 tablet, Rfl: 0    valACYclovir (VALTREX) 1,000 mg tablet, Take 1 tablet (1,000 mg total) by mouth 2 (two) times a day as needed (cold sores), Disp: 60 tablet, Rfl: 1    ALLERGIES:  No Known Allergies    REVIEW OF SYSTEMS:  Pertinent items are noted in HPI.  A comprehensive review of systems was negative.    LABS:  HgA1c:   Lab Results   Component Value Date    HGBA1C 5.9 (H) 08/02/2023     BMP:   Lab Results   Component Value Date    CALCIUM 8.8 03/11/2024    K 4.2 03/11/2024    CO2 31 03/11/2024     03/11/2024    BUN 19 03/11/2024    CREATININE 0.64 03/11/2024       _____________________________________________________  PHYSICAL EXAMINATION:  Vital signs: /80   Pulse 69   Ht 5' 3\" (1.6 m)   Wt 62.6 kg (138 lb)   BMI 24.45 kg/m²   General: well developed and well nourished, alert, oriented times 3, and appears comfortable  Psychiatric: Normal  HEENT: Trachea Midline, No torticollis  Cardiovascular: No discernable arrhythmia  Pulmonary: No wheezing or stridor  Abdomen: No rebound or guarding  Extremities: No peripheral edema  Skin: No masses, erythema, lacerations, fluctation, ulcerations  Neurovascular: Sensation Intact to the Median, Ulnar, Radial Nerve, Motor Intact to the Median, Ulnar, Radial Nerve, and Pulses Intact    MUSCULOSKELETAL EXAMINATION:  Right hand small finger  Swelling and ecchymosis is noted over the small and ring finger, no erythema noted  Abrasions appreciated over the right ring finger proximal phalanx with no active drainage. Scabbing has started to occur.  She does have slight rotation when trying to the full composite fist  Sensation is intact to light touch      _____________________________________________________  STUDIES REVIEWED:  Images were reviewed in " PACS by Dr. Leiva and demonstrate: X-rays of the right small finger demonstrated x-rays of the right small finger proximal phalanx demonstrated a comminuted slightly displaced proximal phalanx fracture, unchanged compared to x-rays completed on 4/17/24      PROCEDURES PERFORMED:  Procedures  No Procedures performed today    Scribe Attestation      I,:  Maida Salter am acting as a scribe while in the presence of the attending physician.:       I,:  Ru Leiva MD personally performed the services described in this documentation    as scribed in my presence.:

## 2024-04-25 NOTE — TELEPHONE ENCOUNTER
Caller: Patient     Doctor/Office: Abbie    Call regarding :  appt      Call was transferred to: Tanja

## 2024-04-27 DIAGNOSIS — F32.A DEPRESSION, UNSPECIFIED DEPRESSION TYPE: ICD-10-CM

## 2024-04-27 RX ORDER — SERTRALINE HYDROCHLORIDE 100 MG/1
TABLET, FILM COATED ORAL
Qty: 90 TABLET | Refills: 0 | Status: SHIPPED | OUTPATIENT
Start: 2024-04-27
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
## 2024-04-29 RX ORDER — DOXYCYCLINE HYCLATE 100 MG/1
CAPSULE ORAL
Qty: 2 CAPSULE | Refills: 0 | Status: SHIPPED | OUTPATIENT
Start: 2024-04-29 | End: 2024-04-30 | Stop reason: SDUPTHER
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
## 2024-04-30 RX ORDER — DOXYCYCLINE HYCLATE 100 MG/1
CAPSULE ORAL
Qty: 2 CAPSULE | Refills: 0 | Status: SHIPPED | OUTPATIENT
Start: 2024-04-30 | End: 2024-05-01

## 2024-05-02 DIAGNOSIS — E03.9 HYPOTHYROIDISM, UNSPECIFIED TYPE: ICD-10-CM

## 2024-05-03 RX ORDER — LEVOTHYROXINE SODIUM 0.15 MG/1
TABLET ORAL
Qty: 90 TABLET | Refills: 1 | Status: SHIPPED | OUTPATIENT
Start: 2024-05-03

## 2024-05-09 ENCOUNTER — APPOINTMENT (OUTPATIENT)
Dept: LAB | Facility: MEDICAL CENTER | Age: 64
End: 2024-05-09
Payer: COMMERCIAL

## 2024-05-09 DIAGNOSIS — E03.9 HYPOTHYROIDISM, UNSPECIFIED TYPE: ICD-10-CM

## 2024-05-09 LAB
T4 FREE SERPL-MCNC: 1.33 NG/DL (ref 0.61–1.12)
TSH SERPL DL<=0.05 MIU/L-ACNC: 0.01 UIU/ML (ref 0.45–4.5)

## 2024-05-09 PROCEDURE — 84443 ASSAY THYROID STIM HORMONE: CPT

## 2024-05-09 PROCEDURE — 36415 COLL VENOUS BLD VENIPUNCTURE: CPT

## 2024-05-09 PROCEDURE — 84439 ASSAY OF FREE THYROXINE: CPT

## 2024-05-15 ENCOUNTER — HOSPITAL ENCOUNTER (OUTPATIENT)
Dept: RADIOLOGY | Facility: HOSPITAL | Age: 64
Discharge: HOME | End: 2024-05-15
Attending: ORTHOPAEDIC SURGERY
Payer: COMMERCIAL

## 2024-05-15 ENCOUNTER — OFFICE VISIT (OUTPATIENT)
Dept: OBGYN CLINIC | Facility: HOSPITAL | Age: 64
End: 2024-05-15

## 2024-05-15 VITALS — BODY MASS INDEX: 24.45 KG/M2 | WEIGHT: 138 LBS | HEIGHT: 63 IN

## 2024-05-15 DIAGNOSIS — S62.646A CLOSED NONDISPLACED FRACTURE OF PROXIMAL PHALANX OF RIGHT LITTLE FINGER, INITIAL ENCOUNTER: ICD-10-CM

## 2024-05-15 DIAGNOSIS — S62.646A CLOSED NONDISPLACED FRACTURE OF PROXIMAL PHALANX OF RIGHT LITTLE FINGER, INITIAL ENCOUNTER: Primary | ICD-10-CM

## 2024-05-15 PROCEDURE — 99024 POSTOP FOLLOW-UP VISIT: CPT | Performed by: ORTHOPAEDIC SURGERY

## 2024-05-15 PROCEDURE — 73140 X-RAY EXAM OF FINGER(S): CPT

## 2024-05-15 NOTE — PROGRESS NOTES
ASSESSMENT/PLAN:    Assessment:   Right small finger proximal phalanx oblique fracture    Plan:   New x-rays were obtained today and reviewed with the patient. Discussed that there is still angulation at the fracture site however there are signs of clinical healing  She will continue use of the intrinsic plus splint from OT when sleeping and with heavy lifting. She may discontinue the use of the splint with light activity.  She will follow-up in 3 weeks for reevaluation and x-rays of the right small finger    Follow Up:  3  week(s)    To Do Next Visit:  X-rays of the  right  small finger    General Discussions:  Fracture - Nonoperative Care: The physiology of a fractured bone was discussed with the patient today.  With nondisplaced or minimally displaced fractures, conservative treatment often results in a functional recovery.  Typically, these fractures are immobilized in either a cast or splint depending on the pattern.  Radiographs are typically taken at intervals throughout the fracture healing to ensure that muscular forces do not cause loss of reduction or alignment.  If the fracture loses its alignment, surgical intervention may be required to stabilize it.  Medical conditions such as diabetes, osteoporosis, vitamin D deficiency, and a history of or exposure to smoking may delay or prevent fracture healing.        _____________________________________________________  CHIEF COMPLAINT:  Chief Complaint   Patient presents with    Right Little Finger - Fracture     Closed Nondisplaced Fx - 4/16/24           SUBJECTIVE:  Lisa Canseco is a 63 y.o. female who presents for follow-up of right small finger fracture of middle phalanx.  Injury occurred on 4/16/2024 when she was walking her dogs when one of them took off and the leash was wrapped around her hand.  She felt a twist and a pop.  She also had fallen onto the ground.  She did go to urgent care and had an x-ray which demonstrated a proximal phalanx fracture.   She was seen in office with orthopedics on 4/17/24 with right hand small finger proximal phalanx. On presentation today, ecchymosis and swelling has resolved since last visit on 4/24/24. She has been compliant with the brace that OT made. She only removes it for washing dishes, typing at work and when sitting at home relaxing. She states that the pain has decreased. She still has some discomfort when making a composite fist.  She denies numbness and tingling however does state that since healing the sensation in the distal tip of her right pinky is different than the left hand.        PAST MEDICAL HISTORY:  Past Medical History:   Diagnosis Date    Cataract, right     Closed fracture of right distal fibula     Detached retina, right     Disease of thyroid gland     OCD (obsessive compulsive disorder)     Seasonal allergies     Wrist fracture        PAST SURGICAL HISTORY:  Past Surgical History:   Procedure Laterality Date    CATARACT EXTRACTION      EYE SURGERY      FOOT SURGERY      MANDIBLE FRACTURE SURGERY      TONSILLECTOMY         FAMILY HISTORY:  Family History   Problem Relation Age of Onset    Heart disease Mother         cardiac    Colon cancer Father 65    Heart disease Father     No Known Problems Sister     No Known Problems Daughter     No Known Problems Maternal Grandmother     No Known Problems Maternal Grandfather     No Known Problems Paternal Grandmother     Prostate cancer Paternal Grandfather     No Known Problems Sister     No Known Problems Daughter     No Known Problems Maternal Aunt     No Known Problems Paternal Aunt     No Known Problems Paternal Aunt        SOCIAL HISTORY:  Social History     Tobacco Use    Smoking status: Never    Smokeless tobacco: Never   Vaping Use    Vaping status: Never Used   Substance Use Topics    Alcohol use: Yes     Comment: social    Drug use: Never       MEDICATIONS:    Current Outpatient Medications:     levothyroxine 150 mcg tablet, TAKE ONE TABLET BY MOUTH  "DAILY IN THE EARLY MORNING, Disp: 90 tablet, Rfl: 1    sertraline (ZOLOFT) 100 mg tablet, TAKE ONE TABLET BY MOUTH EVERY DAY, Disp: 90 tablet, Rfl: 0    valACYclovir (VALTREX) 1,000 mg tablet, Take 1 tablet (1,000 mg total) by mouth 2 (two) times a day as needed (cold sores), Disp: 60 tablet, Rfl: 1    predniSONE 10 mg tablet, 3 tabs po bid x2 days, then 2 tabs po bid x2 days, then 1 tab bid x2 days, then 1 daily until done., Disp: 26 tablet, Rfl: 0    ALLERGIES:  No Known Allergies    REVIEW OF SYSTEMS:  Pertinent items are noted in HPI.  A comprehensive review of systems was negative.    LABS:  HgA1c:   Lab Results   Component Value Date    HGBA1C 5.9 (H) 08/02/2023     BMP:   Lab Results   Component Value Date    CALCIUM 8.8 03/11/2024    K 4.2 03/11/2024    CO2 31 03/11/2024     03/11/2024    BUN 19 03/11/2024    CREATININE 0.64 03/11/2024       _____________________________________________________  PHYSICAL EXAMINATION:  Vital signs: Ht 5' 3\" (1.6 m)   Wt 62.6 kg (138 lb)   BMI 24.45 kg/m²   General: well developed and well nourished, alert, oriented times 3, and appears comfortable  Psychiatric: Normal  HEENT: Trachea Midline, No torticollis  Cardiovascular: No discernable arrhythmia  Pulmonary: No wheezing or stridor  Abdomen: No rebound or guarding  Extremities: No peripheral edema  Skin: No masses, erythema, lacerations, fluctation, ulcerations  Neurovascular: Sensation Intact to the Median, Ulnar, Radial Nerve, Motor Intact to the Median, Ulnar, Radial Nerve, and Pulses Intact    MUSCULOSKELETAL EXAMINATION:  Right hand small finger  Swelling and ecchymosis over the small and ring finger has resolved  She does have slight rotation when trying to the full composite fist although minimal and unchanged  Sensation is intact.  Minimal to no tenderness localized over the fracture site.  No catching, clicking, locking, no signs of trigger " finger.      _____________________________________________________  STUDIES REVIEWED:  Images were reviewed in PACS by Dr. Leiva and demonstrate: X-rays of the right small finger demonstrated x-rays of the right small finger proximal phalanx demonstrated a comminuted slightly displaced proximal phalanx fracture, interval healing compared to x-rays completed on 4/24/24      PROCEDURES PERFORMED:  Procedures  No Procedures performed today          Scribe Attestation      I,:  Maida Salter am acting as a scribe while in the presence of the attending physician.:       I,:  Ru Leiva MD personally performed the services described in this documentation    as scribed in my presence.:

## 2024-05-23 ENCOUNTER — TELEPHONE (OUTPATIENT)
Dept: OBGYN CLINIC | Facility: HOSPITAL | Age: 64
End: 2024-05-23

## 2024-05-23 ENCOUNTER — APPOINTMENT (OUTPATIENT)
Dept: RADIOLOGY | Age: 64
End: 2024-05-23
Payer: COMMERCIAL

## 2024-05-23 DIAGNOSIS — S62.616A CLOSED DISPLACED FRACTURE OF PROXIMAL PHALANX OF RIGHT LITTLE FINGER, INITIAL ENCOUNTER: Primary | ICD-10-CM

## 2024-05-23 DIAGNOSIS — S62.616A CLOSED DISPLACED FRACTURE OF PROXIMAL PHALANX OF RIGHT LITTLE FINGER, INITIAL ENCOUNTER: ICD-10-CM

## 2024-05-23 PROCEDURE — 73140 X-RAY EXAM OF FINGER(S): CPT

## 2024-05-23 NOTE — TELEPHONE ENCOUNTER
Caller: Patient    Doctor: Abbie    Reason for call: Patient reinjured fx finger yesterday and was asking if she could have an XR due to severe pain?  She works in the  office.  Please advise.    Call back#: [phone]

## 2024-05-24 ENCOUNTER — TELEPHONE (OUTPATIENT)
Dept: FAMILY MEDICINE CLINIC | Facility: CLINIC | Age: 64
End: 2024-05-24

## 2024-05-29 ENCOUNTER — CLINICAL SUPPORT (OUTPATIENT)
Dept: FAMILY MEDICINE CLINIC | Facility: CLINIC | Age: 64
End: 2024-05-29
Payer: COMMERCIAL

## 2024-05-29 DIAGNOSIS — M81.0 OSTEOPOROSIS, UNSPECIFIED OSTEOPOROSIS TYPE, UNSPECIFIED PATHOLOGICAL FRACTURE PRESENCE: Primary | ICD-10-CM

## 2024-05-29 DIAGNOSIS — M81.8 OTHER OSTEOPOROSIS WITHOUT CURRENT PATHOLOGICAL FRACTURE: ICD-10-CM

## 2024-05-29 PROCEDURE — 96372 THER/PROPH/DIAG INJ SC/IM: CPT

## 2024-06-05 ENCOUNTER — HOSPITAL ENCOUNTER (OUTPATIENT)
Dept: RADIOLOGY | Facility: HOSPITAL | Age: 64
Discharge: HOME | End: 2024-06-05
Attending: ORTHOPAEDIC SURGERY
Payer: COMMERCIAL

## 2024-06-05 ENCOUNTER — OFFICE VISIT (OUTPATIENT)
Dept: OBGYN CLINIC | Facility: HOSPITAL | Age: 64
End: 2024-06-05

## 2024-06-05 VITALS
SYSTOLIC BLOOD PRESSURE: 145 MMHG | HEART RATE: 73 BPM | HEIGHT: 63 IN | DIASTOLIC BLOOD PRESSURE: 84 MMHG | BODY MASS INDEX: 24.45 KG/M2 | WEIGHT: 138 LBS

## 2024-06-05 DIAGNOSIS — S62.646A CLOSED NONDISPLACED FRACTURE OF PROXIMAL PHALANX OF RIGHT LITTLE FINGER, INITIAL ENCOUNTER: Primary | ICD-10-CM

## 2024-06-05 DIAGNOSIS — S62.646A CLOSED NONDISPLACED FRACTURE OF PROXIMAL PHALANX OF RIGHT LITTLE FINGER, INITIAL ENCOUNTER: ICD-10-CM

## 2024-06-05 PROCEDURE — 73140 X-RAY EXAM OF FINGER(S): CPT

## 2024-06-05 PROCEDURE — 99024 POSTOP FOLLOW-UP VISIT: CPT | Performed by: ORTHOPAEDIC SURGERY

## 2024-06-05 NOTE — PROGRESS NOTES
ASSESSMENT/PLAN:    Assessment:   Right small finger proximal phalanx oblique fracture    Plan:   New x-rays were obtained today and reviewed with the patient. Discussed that there is still slight angulation with improved alignment of fracture site however there are signs of clinical healing  She will continue use of the intrinsic plus splint from OT with heavy lifting. She may discontinue the use of the splint with light activity and when sleeping, in 6 weeks she can discontinue the splint altogether. Patient will follow-up PRN if symptoms persist      Follow Up:  PRN    To Do Next Visit:  Re-evaluation    General Discussions:  Fracture - Nonoperative Care: The physiology of a fractured bone was discussed with the patient today.  With nondisplaced or minimally displaced fractures, conservative treatment often results in a functional recovery.  Typically, these fractures are immobilized in either a cast or splint depending on the pattern.  Radiographs are typically taken at intervals throughout the fracture healing to ensure that muscular forces do not cause loss of reduction or alignment.  If the fracture loses its alignment, surgical intervention may be required to stabilize it.  Medical conditions such as diabetes, osteoporosis, vitamin D deficiency, and a history of or exposure to smoking may delay or prevent fracture healing.        _____________________________________________________  CHIEF COMPLAINT:  Chief Complaint   Patient presents with    Right Little Finger - Fracture     Proximal Phalanx Oblique FX            SUBJECTIVE:  Lisa Canseco is a 63 y.o. female who presents for follow-up of right small finger fracture of middle phalanx.  Injury occurred on 4/16/2024 when she was walking her dogs when one of them took off and the leash was wrapped around her hand.  She felt a twist and a pop.  She also had fallen onto the ground.  She did go to urgent care and had an x-ray which demonstrated a proximal  phalanx fracture.  She was seen in office with orthopedics on 4/17/24 with right hand small finger proximal phalanx. As of visit on 4/24/24 ecchymosis and swelling has resolved since last visit on  She has been compliant with the brace that OT made. She only removes it for washing dishes, typing at work and when sitting at home relaxing. She states that the pain has decreased. She still has some discomfort when making a composite fist.  She denies numbness and tingling however does state that since healing the sensation in the distal tip of her right pinky is different than the left hand.    On presentation today, patient presents for follow-up evaluation regarding right small finger proximal phalanx oblique fracture. She states that since her last visit on 5/15/24 she had a re-injury where she was grabbing her heavy bag off of the hook on the back of the door when the bag slipped and hyperextended her right small finger creating significant swelling and pain. She states that pain has decreased since the injury and swelling has resolved almost completely      PAST MEDICAL HISTORY:  Past Medical History:   Diagnosis Date    Cataract, right     Closed fracture of right distal fibula     Detached retina, right     Disease of thyroid gland     OCD (obsessive compulsive disorder)     Seasonal allergies     Wrist fracture        PAST SURGICAL HISTORY:  Past Surgical History:   Procedure Laterality Date    CATARACT EXTRACTION      EYE SURGERY      FOOT SURGERY      MANDIBLE FRACTURE SURGERY      TONSILLECTOMY         FAMILY HISTORY:  Family History   Problem Relation Age of Onset    Heart disease Mother         cardiac    Colon cancer Father 65    Heart disease Father     No Known Problems Sister     No Known Problems Daughter     No Known Problems Maternal Grandmother     No Known Problems Maternal Grandfather     No Known Problems Paternal Grandmother     Prostate cancer Paternal Grandfather     No Known Problems Sister   "   No Known Problems Daughter     No Known Problems Maternal Aunt     No Known Problems Paternal Aunt     No Known Problems Paternal Aunt        SOCIAL HISTORY:  Social History     Tobacco Use    Smoking status: Never    Smokeless tobacco: Never   Vaping Use    Vaping status: Never Used   Substance Use Topics    Alcohol use: Yes     Comment: social    Drug use: Never       MEDICATIONS:    Current Outpatient Medications:     levothyroxine 150 mcg tablet, TAKE ONE TABLET BY MOUTH DAILY IN THE EARLY MORNING, Disp: 90 tablet, Rfl: 1    sertraline (ZOLOFT) 100 mg tablet, TAKE ONE TABLET BY MOUTH EVERY DAY, Disp: 90 tablet, Rfl: 0    valACYclovir (VALTREX) 1,000 mg tablet, Take 1 tablet (1,000 mg total) by mouth 2 (two) times a day as needed (cold sores), Disp: 60 tablet, Rfl: 1    predniSONE 10 mg tablet, 3 tabs po bid x2 days, then 2 tabs po bid x2 days, then 1 tab bid x2 days, then 1 daily until done., Disp: 26 tablet, Rfl: 0    ALLERGIES:  No Known Allergies    REVIEW OF SYSTEMS:  Pertinent items are noted in HPI.  A comprehensive review of systems was negative.    LABS:  HgA1c:   Lab Results   Component Value Date    HGBA1C 5.9 (H) 08/02/2023     BMP:   Lab Results   Component Value Date    CALCIUM 8.8 03/11/2024    K 4.2 03/11/2024    CO2 31 03/11/2024     03/11/2024    BUN 19 03/11/2024    CREATININE 0.64 03/11/2024       _____________________________________________________  PHYSICAL EXAMINATION:  Vital signs: /84   Pulse 73   Ht 5' 3\" (1.6 m)   Wt 62.6 kg (138 lb)   BMI 24.45 kg/m²   General: well developed and well nourished, alert, oriented times 3, and appears comfortable  Psychiatric: Normal  HEENT: Trachea Midline, No torticollis  Cardiovascular: No discernable arrhythmia  Pulmonary: No wheezing or stridor  Abdomen: No rebound or guarding  Extremities: No peripheral edema  Skin: No masses, erythema, lacerations, fluctation, ulcerations  Neurovascular: Sensation Intact to the Median, Ulnar, " Radial Nerve, Motor Intact to the Median, Ulnar, Radial Nerve, and Pulses Intact    MUSCULOSKELETAL EXAMINATION:  Right hand small finger  Minimal swelling at base of right small finger CMC  She does have slight rotation when trying to the full composite fist although minimal and unchanged  Sensation is intact.  Minimal to no tenderness localized over the fracture site.  No catching, clicking, locking, no signs of trigger finger.      _____________________________________________________  STUDIES REVIEWED:  Images were reviewed in PACS by Dr. Leiva and demonstrate: X-rays of the right small finger demonstrated x-rays of the right small finger proximal phalanx demonstrated a comminuted slightly displaced proximal phalanx fracture, interval healing compared to x-rays completed on 5/15/24      PROCEDURES PERFORMED:  Procedures  No Procedures performed today    Scribe Attestation      I,:  Maida Salter am acting as a scribe while in the presence of the attending physician.:       I,:  Ru Leiva MD personally performed the services described in this documentation    as scribed in my presence.:

## 2024-07-06 ENCOUNTER — APPOINTMENT (OUTPATIENT)
Dept: LAB | Facility: MEDICAL CENTER | Age: 64
End: 2024-07-06

## 2024-07-06 DIAGNOSIS — Z00.8 HEALTH EXAMINATION IN POPULATION SURVEYS: ICD-10-CM

## 2024-07-06 LAB
CHOLEST SERPL-MCNC: 255 MG/DL
EST. AVERAGE GLUCOSE BLD GHB EST-MCNC: 117 MG/DL
HBA1C MFR BLD: 5.7 %
HDLC SERPL-MCNC: 91 MG/DL
LDLC SERPL CALC-MCNC: 147 MG/DL (ref 0–100)
NONHDLC SERPL-MCNC: 164 MG/DL
TRIGL SERPL-MCNC: 87 MG/DL

## 2024-07-06 PROCEDURE — 83036 HEMOGLOBIN GLYCOSYLATED A1C: CPT

## 2024-07-06 PROCEDURE — 80061 LIPID PANEL: CPT

## 2024-07-06 PROCEDURE — 36415 COLL VENOUS BLD VENIPUNCTURE: CPT

## 2024-07-16 ENCOUNTER — TELEPHONE (OUTPATIENT)
Dept: FAMILY MEDICINE CLINIC | Facility: CLINIC | Age: 64
End: 2024-07-16

## 2024-07-16 DIAGNOSIS — E03.9 HYPOTHYROIDISM, UNSPECIFIED TYPE: Primary | ICD-10-CM

## 2024-07-16 NOTE — TELEPHONE ENCOUNTER
Pt came in to ask if she needs a new referral for Dr. Ernandez since she has already been established there.   She also asked if a new Thyroid lab order can be placed ordered

## 2024-08-07 ENCOUNTER — APPOINTMENT (OUTPATIENT)
Dept: LAB | Facility: MEDICAL CENTER | Age: 64
End: 2024-08-07
Payer: COMMERCIAL

## 2024-08-07 DIAGNOSIS — E03.9 HYPOTHYROIDISM, UNSPECIFIED TYPE: ICD-10-CM

## 2024-08-07 LAB
T4 FREE SERPL-MCNC: 0.59 NG/DL (ref 0.61–1.12)
TSH SERPL DL<=0.05 MIU/L-ACNC: 5.38 UIU/ML (ref 0.45–4.5)

## 2024-08-07 PROCEDURE — 84439 ASSAY OF FREE THYROXINE: CPT

## 2024-08-07 PROCEDURE — 36415 COLL VENOUS BLD VENIPUNCTURE: CPT

## 2024-08-07 PROCEDURE — 84443 ASSAY THYROID STIM HORMONE: CPT

## 2024-08-08 DIAGNOSIS — E03.9 HYPOTHYROIDISM, UNSPECIFIED TYPE: Primary | ICD-10-CM

## 2024-08-08 RX ORDER — LEVOTHYROXINE SODIUM 175 UG/1
175 TABLET ORAL DAILY
Qty: 30 TABLET | Refills: 3 | Status: SHIPPED | OUTPATIENT
Start: 2024-08-08

## 2024-09-13 DIAGNOSIS — F32.A DEPRESSION, UNSPECIFIED DEPRESSION TYPE: ICD-10-CM

## 2024-09-13 RX ORDER — SERTRALINE HYDROCHLORIDE 100 MG/1
TABLET, FILM COATED ORAL
Qty: 90 TABLET | Refills: 1 | Status: SHIPPED | OUTPATIENT
Start: 2024-09-13

## 2024-09-26 ENCOUNTER — APPOINTMENT (OUTPATIENT)
Dept: LAB | Facility: MEDICAL CENTER | Age: 64
End: 2024-09-26
Payer: COMMERCIAL

## 2024-09-26 DIAGNOSIS — E03.9 HYPOTHYROIDISM, UNSPECIFIED TYPE: ICD-10-CM

## 2024-09-26 LAB
T4 FREE SERPL-MCNC: 1.14 NG/DL (ref 0.61–1.12)
TSH SERPL DL<=0.05 MIU/L-ACNC: 0.07 UIU/ML (ref 0.45–4.5)

## 2024-09-26 PROCEDURE — 84439 ASSAY OF FREE THYROXINE: CPT

## 2024-09-26 PROCEDURE — 36415 COLL VENOUS BLD VENIPUNCTURE: CPT

## 2024-09-26 PROCEDURE — 84443 ASSAY THYROID STIM HORMONE: CPT

## 2024-09-27 ENCOUNTER — TELEPHONE (OUTPATIENT)
Dept: FAMILY MEDICINE CLINIC | Facility: CLINIC | Age: 64
End: 2024-09-27

## 2024-09-27 DIAGNOSIS — E03.9 HYPOTHYROIDISM, UNSPECIFIED TYPE: ICD-10-CM

## 2024-09-27 DIAGNOSIS — E03.9 HYPOTHYROIDISM, UNSPECIFIED TYPE: Primary | ICD-10-CM

## 2024-09-27 RX ORDER — LEVOTHYROXINE SODIUM 150 UG/1
150 TABLET ORAL
Qty: 30 TABLET | Refills: 3 | Status: SHIPPED | OUTPATIENT
Start: 2024-09-27 | End: 2024-09-27 | Stop reason: SDUPTHER

## 2024-09-27 RX ORDER — LEVOTHYROXINE SODIUM 150 MCG
150 TABLET ORAL DAILY
Qty: 30 TABLET | Refills: 3 | OUTPATIENT
Start: 2024-09-27

## 2024-09-27 RX ORDER — LEVOTHYROXINE SODIUM 150 UG/1
150 TABLET ORAL
Qty: 30 TABLET | Refills: 3 | Status: SHIPPED | OUTPATIENT
Start: 2024-09-27

## 2024-09-27 NOTE — TELEPHONE ENCOUNTER
Please put rx to in as Brand Name Synthroid. Please make sure the ROSSANA box is checked off so when sending rx so it's sent correctly to pharmacy as Brand Name Only. When done PA can be submitted as brand name only Synthroid as it is in now as generic levothyroxine and that will be how PA is processed.

## 2024-09-27 NOTE — TELEPHONE ENCOUNTER
Writing it in the script doesn't put the rx in as brand name so the PA can be done as brand name. I pended the Synthroid Medication order correctly as Brand Name Synthoid so you can see how it should be put in and sent over. Thank You.

## 2024-10-21 ENCOUNTER — PROCEDURE VISIT (OUTPATIENT)
Dept: FAMILY MEDICINE CLINIC | Facility: CLINIC | Age: 64
End: 2024-10-21
Payer: COMMERCIAL

## 2024-10-21 VITALS
BODY MASS INDEX: 24.91 KG/M2 | HEART RATE: 87 BPM | DIASTOLIC BLOOD PRESSURE: 72 MMHG | WEIGHT: 140.6 LBS | SYSTOLIC BLOOD PRESSURE: 114 MMHG | OXYGEN SATURATION: 97 % | TEMPERATURE: 97.4 F | HEIGHT: 63 IN

## 2024-10-21 DIAGNOSIS — L02.91 ABSCESS: Primary | ICD-10-CM

## 2024-10-21 PROCEDURE — 99213 OFFICE O/P EST LOW 20 MIN: CPT | Performed by: FAMILY MEDICINE

## 2024-10-21 PROCEDURE — 10060 I&D ABSCESS SIMPLE/SINGLE: CPT | Performed by: FAMILY MEDICINE

## 2024-10-21 RX ORDER — CEPHALEXIN 500 MG/1
500 CAPSULE ORAL EVERY 12 HOURS SCHEDULED
Qty: 10 CAPSULE | Refills: 0 | Status: SHIPPED | OUTPATIENT
Start: 2024-10-21 | End: 2024-10-26

## 2024-10-29 NOTE — PROGRESS NOTES
Patient ID: Lisa Canseco is a 64 y.o. female.    HPI: 64 y.o.female presents for evaluation of a painful cyst in the middle of the upper back.  She denies any fever or drainage.      SUBJECTIVE    Family History   Problem Relation Age of Onset    Heart disease Mother         cardiac    Colon cancer Father 65    Heart disease Father     No Known Problems Sister     No Known Problems Daughter     No Known Problems Maternal Grandmother     No Known Problems Maternal Grandfather     No Known Problems Paternal Grandmother     Prostate cancer Paternal Grandfather     No Known Problems Sister     No Known Problems Daughter     No Known Problems Maternal Aunt     No Known Problems Paternal Aunt     No Known Problems Paternal Aunt      Social History     Socioeconomic History    Marital status: /Civil Union     Spouse name: Not on file    Number of children: Not on file    Years of education: Not on file    Highest education level: Not on file   Occupational History    Not on file   Tobacco Use    Smoking status: Never    Smokeless tobacco: Never   Vaping Use    Vaping status: Never Used   Substance and Sexual Activity    Alcohol use: Yes     Comment: social    Drug use: Never    Sexual activity: Not on file   Other Topics Concern    Not on file   Social History Narrative    Daily coffee    Denied cola    Denied tea     Social Determinants of Health     Financial Resource Strain: Not on file   Food Insecurity: Not on file   Transportation Needs: Not on file   Physical Activity: Not on file   Stress: Not on file   Social Connections: Not on file   Intimate Partner Violence: Not on file   Housing Stability: Not on file     Past Medical History:   Diagnosis Date    Cataract, right     Closed fracture of right distal fibula     Detached retina, right     Disease of thyroid gland     OCD (obsessive compulsive disorder)     Seasonal allergies     Wrist fracture      Past Surgical History:   Procedure Laterality Date     "CATARACT EXTRACTION      EYE SURGERY      FOOT SURGERY      MANDIBLE FRACTURE SURGERY      TONSILLECTOMY       No Known Allergies    Current Outpatient Medications:     levothyroxine 150 mcg tablet, Take 1 tablet (150 mcg total) by mouth daily in the early morning, Disp: 30 tablet, Rfl: 3    sertraline (ZOLOFT) 100 mg tablet, TAKE ONE TABLET BY MOUTH EVERY DAY, Disp: 90 tablet, Rfl: 1    Synthroid 150 MCG tablet, Take 1 tablet (150 mcg total) by mouth daily, Disp: 30 tablet, Rfl: 3    valACYclovir (VALTREX) 1,000 mg tablet, Take 1 tablet (1,000 mg total) by mouth 2 (two) times a day as needed (cold sores), Disp: 60 tablet, Rfl: 1    Review of Systems  Constitutional:     Denies fever, chills ,fatigue ,weakness ,weight loss, weight gain     ENT: Denies earache ,loss of hearing ,nosebleed, nasal discharge,nasal congestion ,sore throat ,hoarseness  Pulmonary: Denies shortness of breath ,cough  ,dyspnea on exertion, orthopnea  ,PND   Cardiovascular:  Denies bradycardia , tachycardia  ,palpations, lower extremity edema leg, claudication  Breast:  Denies new or changing breast lumps ,nipple discharge ,nipple changes  Abdomen:  Denies abdominal pain , anorexia , indigestion, nausea, vomiting, constipation, diarrhea  Musculoskeletal: Denies myalgias, arthralgias, joint swelling, joint stiffness , limb pain, limb swelling  Gu: denies dysuria, polyuria  Skin: Denies skin rash, skin lesion, skin wound, itching, dry skin+ thoracic cyst mid back; red and sore  Neuro: Denies headache, numbness, tingling, confusion, loss of consciousness, dizziness, vertigo  Psychiatric: Denies feelings of depression, suicidal ideation, anxiety, sleep disturbances    OBJECTIVE  /72   Pulse 87   Temp (!) 97.4 °F (36.3 °C)   Ht 5' 3\" (1.6 m)   Wt 63.8 kg (140 lb 9.6 oz)   SpO2 97%   BMI 24.91 kg/m²   Constitutional:   NAD, well appearing and well nourished      ENT:   Conjunctiva and lids: no injection, edema, or discharge     Pupils and " iris: LEXIE bilaterally    External inspection of ears and nose: normal without deformities or discharge.      Otoscopic exam: Canals patent without erythema.       Nasal mucosa, septum and turbinates: Normal or edema or discharge         Oropharynx:  Moist mucosa, normal tongue and tonsils without lesions. No erythema        Pulmonary:Respiratory effort normal rate and rhythm, no increased work of breathing. Auscultation of lungs:  Clear bilaterally with no adventitious breath sounds       Cardiovascular: regular rate and rhythm, S1 and S2, no murmur, no edema and/or varicosities of LE      Abdomen: Soft and non-distended     Positive bowel sounds      No heptomegaly or splenomegaly      Gu: no suprapubic tenderness or CVA tenderness, no urethral discharge  Lymphatic:  No anterior or posterior cervical lymphadenopathy         Musculoskeletal:  Gait and station: Normal gait      Digits and nails normal without clubbing or cyanosis       Inspection/palpation of joints, bones, and muscles:  No joint tenderness, swelling, full active and passive range of motion       Skin: Normal skin turgor and erythematous follicular lesion approx 6cm in diameter  Neuro:         Normal sensation    Psych:   alert and oriented to person, place and time     normal mood and affect   Incision and Drainage    Date/Time: 10/21/2024 12:00 PM    Performed by: Delma Emerson DO  Authorized by: Delma Emerson DO  Universal Protocol:  Consent: Verbal consent obtained.  Consent given by: patient  Patient understanding: patient states understanding of the procedure being performed    Patient location:  Clinic  Location:     Type:  Abscess    Location:  Trunk    Trunk location:  Back  Pre-procedure details:     Skin preparation:  Betadine  Anesthesia (see MAR for exact dosages):     Anesthesia method:  Local infiltration    Local anesthetic:  Lidocaine 2% w/o epi  Procedure details:     Complexity:  Simple    Incision types:   Single straight    Scalpel blade:  10    Approach:  Open    Incision depth:  Subcutaneous    Wound management:  Probed and deloculated    Drainage:  Purulent    Drainage amount:  Moderate    Wound treatment:  Wound left open  Comments:      Steristrips applied to lesion and neopsorin and a bandaid were applied       Assessment/Plan:Diagnoses and all orders for this visit:    Abscess  -     cephalexin (KEFLEX) 500 mg capsule; Take 1 capsule (500 mg total) by mouth every 12 (twelve) hours for 5 days        Reviewed with patient plan to treat with above plan.    Patient instructed to call in 72 hours if not feeling better or if symptoms worsen

## 2024-11-01 ENCOUNTER — OFFICE VISIT (OUTPATIENT)
Dept: FAMILY MEDICINE CLINIC | Facility: CLINIC | Age: 64
End: 2024-11-01

## 2024-11-01 VITALS
TEMPERATURE: 96.7 F | SYSTOLIC BLOOD PRESSURE: 120 MMHG | DIASTOLIC BLOOD PRESSURE: 78 MMHG | BODY MASS INDEX: 24.84 KG/M2 | OXYGEN SATURATION: 99 % | WEIGHT: 140.2 LBS | HEART RATE: 73 BPM | HEIGHT: 63 IN

## 2024-11-01 DIAGNOSIS — R68.89 FLU-LIKE SYMPTOMS: ICD-10-CM

## 2024-11-01 DIAGNOSIS — J06.9 UPPER RESPIRATORY TRACT INFECTION, UNSPECIFIED TYPE: Primary | ICD-10-CM

## 2024-11-01 LAB
SARS-COV-2 AG UPPER RESP QL IA: NEGATIVE
SL AMB POCT RAPID FLU A: NORMAL
SL AMB POCT RAPID FLU B: NORMAL
VALID CONTROL: NORMAL

## 2024-11-01 RX ORDER — LOTEPREDNOL ETABONATE 5 MG/ML
SUSPENSION/ DROPS OPHTHALMIC
COMMUNITY
Start: 2024-10-29

## 2024-11-01 NOTE — PROGRESS NOTES
Ambulatory Visit  Name: Lisa Canseco      : 1960      MRN: [id number]  Encounter Provider: Tyrone Blanco MD  Encounter Date: 2024   Encounter department: [facility]    Assessment & Plan  Flu-like symptoms  Counseled the patient regarding supportive care.  They are to call or return to the office if not improving.   Orders:    POCT Rapid Covid Ag    POCT rapid flu A and B    Upper respiratory tract infection, unspecified type    Orders:    amoxicillin-clavulanate (AUGMENTIN) 875-125 mg per tablet; Take 1 tablet by mouth every 12 (twelve) hours for 7 days       History of Present Illness     Chief Complaint   Patient presents with    Cold Like Symptoms     Chest congestion, sore throat, hoarse, cough, chills, hot flashes. Wednesday. COVID test yesterday negative    Sore Throat   This is a new problem. The current episode started yesterday. The problem has been waxing and waning. Neither side of throat is experiencing more pain than the other. There has been no fever. The pain is at a severity of 4/10. The pain is mild. Associated symptoms include congestion, coughing, headaches, a hoarse voice and swollen glands. Pertinent negatives include no abdominal pain, diarrhea, drooling, ear discharge, ear pain, plugged ear sensation, neck pain, shortness of breath, stridor, trouble swallowing or vomiting.    Patient presents with chest congestion, sore throat, coughing with mucus, tired, achy, chills.     History obtained from : patient  Review of Systems   Constitutional:  Positive for chills and fatigue. Negative for fever.   HENT:  Positive for congestion, hoarse voice and sore throat. Negative for drooling, ear discharge, ear pain and trouble swallowing.    Eyes:  Negative for pain and visual disturbance.   Respiratory:  Positive for cough. Negative for shortness of breath and stridor.    Cardiovascular:  Negative for chest pain and palpitations.   Gastrointestinal:   "Negative for abdominal pain, diarrhea, nausea and vomiting.   Genitourinary:  Negative for dysuria.   Musculoskeletal:  Negative for arthralgias, myalgias and neck pain.   Skin:  Negative for rash and wound.   Neurological:  Positive for headaches. Negative for dizziness.   All other systems reviewed and are negative.    Medical History Reviewed by provider this encounter:           Objective     /78 (BP Location: Left arm, Patient Position: Sitting, Cuff Size: Adult)   Pulse 73   Temp (!) 96.7 °F (35.9 °C)   Ht 5' 3\" (1.6 m)   Wt 63.6 kg (140 lb 3.2 oz)   SpO2 99%   BMI 24.84 kg/m²     Physical Exam  Vitals and nursing note reviewed.   Constitutional:       General: She is not in acute distress.     Appearance: She is well-developed. She is not ill-appearing.   HENT:      Head: Normocephalic and atraumatic.      Right Ear: Tympanic membrane and external ear normal.      Left Ear: Tympanic membrane and external ear normal.      Nose: Nose normal.   Eyes:      Conjunctiva/sclera: Conjunctivae normal.   Neck:      Trachea: No tracheal deviation.   Cardiovascular:      Rate and Rhythm: Normal rate and regular rhythm.      Pulses: Normal pulses.      Heart sounds: Normal heart sounds. No murmur heard.  Pulmonary:      Effort: Pulmonary effort is normal.      Breath sounds: Normal breath sounds. No wheezing, rhonchi or rales.   Abdominal:      Tenderness: There is no abdominal tenderness.   Lymphadenopathy:      Cervical: Cervical adenopathy present.   Skin:     General: Skin is warm and dry.      Capillary Refill: Capillary refill takes less than 2 seconds.      Findings: No rash.   Neurological:      Mental Status: She is alert.      Cranial Nerves: No cranial nerve deficit.         "

## 2024-11-21 DIAGNOSIS — R05.9 COUGH, UNSPECIFIED TYPE: Primary | ICD-10-CM

## 2024-11-21 RX ORDER — PREDNISONE 10 MG/1
TABLET ORAL
Qty: 26 TABLET | Refills: 0 | Status: SHIPPED | OUTPATIENT
Start: 2024-11-21

## 2024-11-26 ENCOUNTER — APPOINTMENT (OUTPATIENT)
Dept: RADIOLOGY | Facility: MEDICAL CENTER | Age: 64
End: 2024-11-26
Payer: COMMERCIAL

## 2024-11-26 DIAGNOSIS — R05.9 COUGH, UNSPECIFIED TYPE: ICD-10-CM

## 2024-11-26 DIAGNOSIS — R05.9 COUGH, UNSPECIFIED TYPE: Primary | ICD-10-CM

## 2024-11-26 PROCEDURE — 71046 X-RAY EXAM CHEST 2 VIEWS: CPT

## 2024-11-26 RX ORDER — BENZONATATE 200 MG/1
200 CAPSULE ORAL 4 TIMES DAILY PRN
Qty: 40 CAPSULE | Refills: 0 | Status: SHIPPED | OUTPATIENT
Start: 2024-11-26

## 2024-11-29 ENCOUNTER — RESULTS FOLLOW-UP (OUTPATIENT)
Dept: FAMILY MEDICINE CLINIC | Facility: CLINIC | Age: 64
End: 2024-11-29

## 2024-12-11 ENCOUNTER — APPOINTMENT (OUTPATIENT)
Dept: LAB | Facility: MEDICAL CENTER | Age: 64
End: 2024-12-11
Payer: COMMERCIAL

## 2024-12-11 DIAGNOSIS — E03.9 HYPOTHYROIDISM, UNSPECIFIED TYPE: ICD-10-CM

## 2024-12-11 LAB
T4 FREE SERPL-MCNC: 0.66 NG/DL (ref 0.61–1.12)
TSH SERPL DL<=0.05 MIU/L-ACNC: 6.97 UIU/ML (ref 0.45–4.5)

## 2024-12-11 PROCEDURE — 36415 COLL VENOUS BLD VENIPUNCTURE: CPT

## 2024-12-11 PROCEDURE — 84443 ASSAY THYROID STIM HORMONE: CPT

## 2024-12-11 PROCEDURE — 84439 ASSAY OF FREE THYROXINE: CPT

## 2024-12-12 ENCOUNTER — RESULTS FOLLOW-UP (OUTPATIENT)
Dept: FAMILY MEDICINE CLINIC | Facility: CLINIC | Age: 64
End: 2024-12-12

## 2025-01-10 DIAGNOSIS — E03.9 HYPOTHYROIDISM, UNSPECIFIED TYPE: ICD-10-CM

## 2025-01-11 RX ORDER — LEVOTHYROXINE SODIUM 150 MCG
150 TABLET ORAL DAILY
Qty: 30 TABLET | Refills: 0 | Status: SHIPPED | OUTPATIENT
Start: 2025-01-11

## 2025-01-20 ENCOUNTER — RESULTS FOLLOW-UP (OUTPATIENT)
Dept: URGENT CARE | Facility: MEDICAL CENTER | Age: 65
End: 2025-01-20

## 2025-01-20 ENCOUNTER — APPOINTMENT (OUTPATIENT)
Dept: RADIOLOGY | Facility: MEDICAL CENTER | Age: 65
End: 2025-01-20
Payer: COMMERCIAL

## 2025-01-20 ENCOUNTER — OFFICE VISIT (OUTPATIENT)
Dept: URGENT CARE | Facility: MEDICAL CENTER | Age: 65
End: 2025-01-20
Payer: COMMERCIAL

## 2025-01-20 VITALS
TEMPERATURE: 97.1 F | WEIGHT: 140 LBS | RESPIRATION RATE: 18 BRPM | DIASTOLIC BLOOD PRESSURE: 60 MMHG | HEART RATE: 88 BPM | OXYGEN SATURATION: 96 % | SYSTOLIC BLOOD PRESSURE: 125 MMHG | BODY MASS INDEX: 24.8 KG/M2

## 2025-01-20 DIAGNOSIS — S93.402A SPRAIN OF LEFT ANKLE, UNSPECIFIED LIGAMENT, INITIAL ENCOUNTER: ICD-10-CM

## 2025-01-20 DIAGNOSIS — S92.902A CLOSED FRACTURE OF LEFT FOOT, INITIAL ENCOUNTER: Primary | ICD-10-CM

## 2025-01-20 PROCEDURE — 73610 X-RAY EXAM OF ANKLE: CPT

## 2025-01-20 PROCEDURE — 99213 OFFICE O/P EST LOW 20 MIN: CPT | Performed by: PHYSICIAN ASSISTANT

## 2025-01-20 PROCEDURE — 73630 X-RAY EXAM OF FOOT: CPT

## 2025-01-20 NOTE — PROGRESS NOTES
[facility]        NAME: Lisa Canseco is a 64 y.o. female  : 1960    MRN: [id number]  DATE: 2025  TIME: 11:20 AM    Assessment and Plan   Closed fracture of left foot, initial encounter [S92.902A]  1. Closed fracture of left foot, initial encounter  Ambulatory Referral to Podiatry      2. Sprain of left ankle, unspecified ligament, initial encounter  XR ankle 3+ vw left    XR foot 3+ vw left            Patient Instructions     Fracture foot  Orthopedic shoe in place  Refer to podiatry  Follow up with PCP in 3-5 days.  Proceed to  ER if symptoms worsen.    Chief Complaint     Chief Complaint   Patient presents with    Ankle Pain     Pt. With left ankle pain, swelling, and bruising that began after she tripped up he steps and twisted the ankle.          History of Present Illness       64-year-old female who presents complaining of ankle pain.  Patient states that she was running up the stairs when she had an inversion type injury.  Denies fall, head trauma, loss of consciousness.    Ankle Pain         Review of Systems   Review of Systems   Constitutional: Negative.    HENT: Negative.     Eyes: Negative.    Respiratory: Negative.  Negative for cough, chest tightness, shortness of breath, wheezing and stridor.    Cardiovascular: Negative.  Negative for chest pain, palpitations and leg swelling.   Musculoskeletal:  Positive for arthralgias.         Current Medications       Current Outpatient Medications:     benzonatate (TESSALON) 200 MG capsule, Take 1 capsule (200 mg total) by mouth 4 (four) times a day as needed for cough (Patient not taking: Reported on 2025), Disp: 40 capsule, Rfl: 0    predniSONE 10 mg tablet, 3 tabs po bid x2 days, then 2 tabs po bid x2 days, then 1 tab bid x2 days, then 1 daily until done. (Patient not taking: Reported on 2025), Disp: 26 tablet, Rfl: 0    sertraline (ZOLOFT) 100 mg tablet, TAKE ONE TABLET BY MOUTH EVERY DAY, Disp: 90 tablet, Rfl: 1     Synthroid 150 MCG tablet, Take 1 tablet (150 mcg total) by mouth daily, Disp: 30 tablet, Rfl: 0    loteprednol etabonate (LOTEMAX) 0.5 % ophthalmic suspension, ONE DROP TO LEFT EYE TWICE DAILY FOR 7 DAYS (Patient not taking: Reported on 1/20/2025), Disp: , Rfl:     valACYclovir (VALTREX) 1,000 mg tablet, Take 1 tablet (1,000 mg total) by mouth 2 (two) times a day as needed (cold sores), Disp: 60 tablet, Rfl: 1    Current Allergies     Allergies as of 01/20/2025    (No Known Allergies)            The following portions of the patient's history were reviewed and updated as appropriate: allergies, current medications, past family history, past medical history, past social history, past surgical history and problem list.     Past Medical History:   Diagnosis Date    Anxiety 2000    Many years,ocd,meds started year 2000    Cataract, right     Closed fracture of right distal fibula 3/22/2022    Detached retina, right     Disease of thyroid gland     OCD (obsessive compulsive disorder)     Seasonal allergies     Wrist fracture        Past Surgical History:   Procedure Laterality Date    CATARACT EXTRACTION      EYE SURGERY      FOOT SURGERY      MANDIBLE FRACTURE SURGERY      TONSILLECTOMY         Family History   Problem Relation Age of Onset    Heart disease Mother         cardiac    Colon cancer Father 65    Heart disease Father     OCD Father     No Known Problems Sister     No Known Problems Daughter     No Known Problems Maternal Grandmother     No Known Problems Maternal Grandfather     No Known Problems Paternal Grandmother     Prostate cancer Paternal Grandfather     No Known Problems Sister     No Known Problems Daughter     No Known Problems Maternal Aunt     No Known Problems Paternal Aunt     No Known Problems Paternal Aunt          Medications have been verified.        Objective   /60   Pulse 88   Temp (!) 97.1 °F (36.2 °C)   Resp 18   Wt 63.5 kg (140 lb)   SpO2 96%   BMI 24.80 kg/m²         Physical Exam     Physical Exam  Constitutional:       Appearance: Normal appearance. She is well-developed.   HENT:      Head: Normocephalic and atraumatic.   Cardiovascular:      Rate and Rhythm: Normal rate and regular rhythm.      Heart sounds: Normal heart sounds.   Pulmonary:      Effort: Pulmonary effort is normal. No respiratory distress.      Breath sounds: Normal breath sounds. No wheezing or rales.   Chest:      Chest wall: No tenderness.   Musculoskeletal:      Cervical back: Normal range of motion and neck supple.      Left ankle: Swelling and ecchymosis present. No deformity or lacerations. Tenderness present over the lateral malleolus. No medial malleolus, ATF ligament, AITF ligament, CF ligament, posterior TF ligament, base of 5th metatarsal or proximal fibula tenderness. Decreased range of motion. Anterior drawer test negative. Normal pulse.        Legs:    Lymphadenopathy:      Cervical: No cervical adenopathy.   Neurological:      Mental Status: She is alert.

## 2025-01-20 NOTE — TELEPHONE ENCOUNTER
Called and spoke with patient.  Informed her of questionable fractures at the ankle (medial/lateral malleolus).  Patient states that she has an appointment with podiatry tomorrow and will discuss it with them then.  Patient thanked me for the call.

## 2025-01-20 NOTE — PATIENT INSTRUCTIONS
Fracture foot  Orthopedic shoe in place  Refer to podiatry  Follow up with PCP in 3-5 days.  Proceed to  ER if symptoms worsen.

## 2025-01-21 ENCOUNTER — OFFICE VISIT (OUTPATIENT)
Dept: PODIATRY | Facility: CLINIC | Age: 65
End: 2025-01-21
Payer: COMMERCIAL

## 2025-01-21 VITALS
HEIGHT: 63 IN | BODY MASS INDEX: 24.8 KG/M2 | DIASTOLIC BLOOD PRESSURE: 90 MMHG | SYSTOLIC BLOOD PRESSURE: 130 MMHG | HEART RATE: 72 BPM | OXYGEN SATURATION: 98 %

## 2025-01-21 DIAGNOSIS — S92.902A CLOSED FRACTURE OF LEFT FOOT, INITIAL ENCOUNTER: ICD-10-CM

## 2025-01-21 DIAGNOSIS — S82.839A AVULSION FRACTURE OF DISTAL END OF FIBULA: Primary | ICD-10-CM

## 2025-01-21 DIAGNOSIS — S92.902A CLOSED FRACTURE OF LEFT FOOT, INITIAL ENCOUNTER: Primary | ICD-10-CM

## 2025-01-21 PROCEDURE — 99243 OFF/OP CNSLTJ NEW/EST LOW 30: CPT | Performed by: PODIATRIST

## 2025-01-21 NOTE — PROGRESS NOTES
Assessment/Plan:     The patient's clinical examination today significant for ecchymosis along the base of the left great toe where there is moderate tenderness to palpation along the fracture site at the base of the proximal phalanx of the left hallux.  Range of motion of the left great toe joint was not tested secondary to pain.  There is point tenderness with palpation to the distal aspect of the left fibula with mild edema and very mild ecchymosis noted.  There is no tenderness with palpation of the anterior and medial ankle gutters.  Pulses palpable.  There are no open lesions.    X-rays of the patient's left foot and ankle were personally reviewed and interpreted.  There is a mildly displaced chip fracture at the base of the proximal phalanx.  There are questionable fractures to the distal fibula noted in the ankle view.  Linear lucency noted along the medial ankle gutter is clinically asymptomatic.    The patient's clinical examination is consistent with a minimally displaced fracture of the left great toe.  There is a likely avulsion fracture at the distal aspect of the fibula.  We will hold off on CT scan for now as it does not really change her treatment paradigm.  Her injuries are nonsurgical in nature.  We will keep her in a cam boot for 4 to 6 weeks.  Follow-up in 3 weeks for repeat x-rays of the left foot and ankle.     Diagnoses and all orders for this visit:    Avulsion fracture of distal end of fibula    Closed fracture of left foot, initial encounter  -     Ambulatory Referral to Podiatry          Subjective:     Patient ID: Lisa Canseco is a 64 y.o. female.    The patient presents today for her initial consultation with [facility] podiatry with a chief complaint of a left great toe fracture and suspected left ankle fracture.  The injury occurred yesterday when the patient was running up some stairs and suffered an inversion injury.  She was seen in the local ED and x-rays of yield a great toe  fracture and suspected left ankle fracture.  She was placed in a surgical shoe and has been performing guarded weightbearing with crutches.      PAST MEDICAL HISTORY:  Past Medical History:   Diagnosis Date    Anxiety 2000    Many years,ocd,meds started year 2000    Cataract, right     Closed fracture of right distal fibula 3/22/2022    Detached retina, right     Disease of thyroid gland     OCD (obsessive compulsive disorder)     Seasonal allergies     Wrist fracture        PAST SURGICAL HISTORY:  Past Surgical History:   Procedure Laterality Date    CATARACT EXTRACTION      EYE SURGERY      FOOT SURGERY      MANDIBLE FRACTURE SURGERY      TONSILLECTOMY          ALLERGIES:  Patient has no known allergies.    MEDICATIONS:  Current Outpatient Medications   Medication Sig Dispense Refill    benzonatate (TESSALON) 200 MG capsule Take 1 capsule (200 mg total) by mouth 4 (four) times a day as needed for cough (Patient not taking: Reported on 1/20/2025) 40 capsule 0    predniSONE 10 mg tablet 3 tabs po bid x2 days, then 2 tabs po bid x2 days, then 1 tab bid x2 days, then 1 daily until done. (Patient not taking: Reported on 1/20/2025) 26 tablet 0    sertraline (ZOLOFT) 100 mg tablet TAKE ONE TABLET BY MOUTH EVERY DAY 90 tablet 1    Synthroid 150 MCG tablet Take 1 tablet (150 mcg total) by mouth daily 30 tablet 0    loteprednol etabonate (LOTEMAX) 0.5 % ophthalmic suspension ONE DROP TO LEFT EYE TWICE DAILY FOR 7 DAYS (Patient not taking: Reported on 1/20/2025)      valACYclovir (VALTREX) 1,000 mg tablet Take 1 tablet (1,000 mg total) by mouth 2 (two) times a day as needed (cold sores) 60 tablet 1     No current facility-administered medications for this visit.       SOCIAL HISTORY:  Social History     Socioeconomic History    Marital status: /Civil Union     Spouse name: None    Number of children: None    Years of education: None    Highest education level: None   Occupational History    None   Tobacco Use     Smoking status: Never    Smokeless tobacco: Never   Vaping Use    Vaping status: Never Used   Substance and Sexual Activity    Alcohol use: Yes     Alcohol/week: 2.0 standard drinks of alcohol     Types: 2 Cans of beer per week     Comment: social drinking, not often    Drug use: Never    Sexual activity: Never   Other Topics Concern    None   Social History Narrative    Daily coffee    Denied cola    Denied tea     Social Drivers of Health     Financial Resource Strain: Not on file   Food Insecurity: Not on file   Transportation Needs: Not on file   Physical Activity: Not on file   Stress: Not on file   Social Connections: Not on file   Intimate Partner Violence: Not on file   Housing Stability: Not on file        Review of Systems   Constitutional: Negative.    HENT: Negative.     Eyes: Negative.    Respiratory: Negative.     Cardiovascular: Negative.    Endocrine: Negative.    Musculoskeletal: Negative.    Neurological: Negative.    Hematological: Negative.    Psychiatric/Behavioral: Negative.           Objective:     Physical Exam  Constitutional:       Appearance: Normal appearance.   HENT:      Head: Normocephalic and atraumatic.      Nose: Nose normal.   Cardiovascular:      Pulses:           Dorsalis pedis pulses are 2+ on the left side.        Posterior tibial pulses are 2+ on the left side.   Pulmonary:      Effort: Pulmonary effort is normal.   Musculoskeletal:        Feet:    Feet:      Comments: The patient's clinical examination today significant for ecchymosis along the base of the left great toe where there is moderate tenderness to palpation along the fracture site at the base of the proximal phalanx of the left hallux.  Range of motion of the left great toe joint was not tested secondary to pain.  There is point tenderness with palpation to the distal aspect of the left fibula with mild edema and very mild ecchymosis noted.  There is no tenderness with palpation of the anterior and medial ankle  gutters.  Pulses palpable.  There are no open lesions.  Skin:     General: Skin is warm.      Capillary Refill: Capillary refill takes less than 2 seconds.   Neurological:      General: No focal deficit present.      Mental Status: She is alert and oriented to person, place, and time.   Psychiatric:         Mood and Affect: Mood normal.         Behavior: Behavior normal.         Thought Content: Thought content normal.

## 2025-02-04 DIAGNOSIS — E03.9 HYPOTHYROIDISM, UNSPECIFIED TYPE: Primary | ICD-10-CM

## 2025-02-08 ENCOUNTER — PATIENT MESSAGE (OUTPATIENT)
Dept: FAMILY MEDICINE CLINIC | Facility: CLINIC | Age: 65
End: 2025-02-08

## 2025-02-08 ENCOUNTER — TELEMEDICINE (OUTPATIENT)
Dept: OTHER | Facility: HOSPITAL | Age: 65
End: 2025-02-08
Payer: COMMERCIAL

## 2025-02-08 ENCOUNTER — APPOINTMENT (OUTPATIENT)
Dept: LAB | Facility: MEDICAL CENTER | Age: 65
End: 2025-02-08
Payer: COMMERCIAL

## 2025-02-08 DIAGNOSIS — E03.9 HYPOTHYROIDISM, UNSPECIFIED TYPE: ICD-10-CM

## 2025-02-08 DIAGNOSIS — M62.830 SPASM OF MUSCLE OF LOWER BACK: Primary | ICD-10-CM

## 2025-02-08 PROCEDURE — 84443 ASSAY THYROID STIM HORMONE: CPT

## 2025-02-08 PROCEDURE — 99213 OFFICE O/P EST LOW 20 MIN: CPT | Performed by: PHYSICIAN ASSISTANT

## 2025-02-08 PROCEDURE — 84439 ASSAY OF FREE THYROXINE: CPT

## 2025-02-08 PROCEDURE — 36415 COLL VENOUS BLD VENIPUNCTURE: CPT

## 2025-02-08 RX ORDER — METHOCARBAMOL 500 MG/1
500 TABLET, FILM COATED ORAL 3 TIMES DAILY PRN
Qty: 6 TABLET | Refills: 0 | Status: SHIPPED | OUTPATIENT
Start: 2025-02-08 | End: 2025-02-12

## 2025-02-08 NOTE — PROGRESS NOTES
Virtual Regular Visit  Name: Lisa Canseco      : 1960      MRN: [id number]  Encounter Provider: Shannon D Severino, PA-C  Encounter Date: 2025   Encounter department: VIRTUAL CARE       Verification of patient location:  Patient is located at Home in the following state in which I hold an active license [place] :  Assessment & Plan  Spasm of muscle of lower back    Orders:    methocarbamol (ROBAXIN) 500 mg tablet; Take 1 tablet (500 mg total) by mouth 3 (three) times a day as needed for muscle spasms        Encounter provider Shannon D Severino, PA-C    The patient was identified by name and date of birth. Lisa Canseco was informed that this is a telemedicine visit and that the visit is being conducted through the Epic Embedded platform. She agrees to proceed..  My office door was closed. No one else was in the room.  She acknowledged consent and understanding of privacy and security of the video platform. The patient has agreed to participate and understands they can discontinue the visit at any time.    Patient is aware this is a billable service.     History obtained from: patient  History of Present Illness     Pt reports fractured her L foot 3 weeks ago. Has been having muscle spasms due to walking odd. Muscle spasms at night when laying flat. Saw Chiropractor. Denies any fevers, neck pain, rash, incontinence or retention, saddle anesthesia, leg numbness/tingling/weakness. No hx IVDA, CA or epidural abscess. No recent LP or injection      Review of Systems   Constitutional:  Negative for fever.   HENT:  Negative for nosebleeds.    Eyes:  Negative for redness.   Respiratory:  Negative for shortness of breath.    Cardiovascular:  Negative for chest pain.   Gastrointestinal:  Negative for blood in stool.   Genitourinary:  Negative for hematuria.   Musculoskeletal:  Positive for myalgias. Negative for gait problem.   Skin:  Negative for rash.   Neurological:  Negative for seizures.    Psychiatric/Behavioral:  Negative for behavioral problems.        Objective   There were no vitals taken for this visit.    Physical Exam  Constitutional:       General: She is not in acute distress.     Appearance: Normal appearance. She is not toxic-appearing.   HENT:      Head: Normocephalic and atraumatic.      Nose: No rhinorrhea.      Mouth/Throat:      Mouth: Mucous membranes are moist.   Eyes:      Conjunctiva/sclera: Conjunctivae normal.   Pulmonary:      Effort: Pulmonary effort is normal. No respiratory distress.      Breath sounds: No wheezing (no gross audible wheeze through computer).   Musculoskeletal:      Cervical back: Normal range of motion.   Skin:     Findings: No rash (on face or neck).   Neurological:      Mental Status: She is alert.      Cranial Nerves: No dysarthria or facial asymmetry.   Psychiatric:         Mood and Affect: Mood normal.         Behavior: Behavior normal.         Visit Time  Total Visit Duration: 10 minutes not including the time spent for establishing the audio/video connection.

## 2025-02-08 NOTE — PATIENT INSTRUCTIONS
Follow-up with the spine center for continued low back pain. You can take 3 ibuprofen (600 mg total) every 6 hours and Tylenol (max 3,000 mg/ 24 hours) in between for continuous pain relief. Ice the bony areas that hurt, use heat to relax muscles. Apply ice or heat for 20 mins max at a time. You can try gentle massage or gentle stretching, but do not do anything that causes the pain to become significantly worse. Go to the Emergency Room for any new worsening or concerning symptoms including loss of bowel or bladder function, retaining urine or feces, leg weakness numbness or tingling, rash or fevers.    1 ([phone]) [employer] ([phone])  Schedule or Reschedule Outpatient Testing - Option 2  Billing - Option 3  General Info - Option 4  MyChart Help - Option 5  Comprehensive Spine Program - Option 6

## 2025-02-09 LAB
T4 FREE SERPL-MCNC: 1.41 NG/DL (ref 0.61–1.12)
TSH SERPL DL<=0.05 MIU/L-ACNC: <0.01 UIU/ML (ref 0.45–4.5)

## 2025-02-10 DIAGNOSIS — M62.830 SPASM OF MUSCLE OF LOWER BACK: ICD-10-CM

## 2025-02-10 RX ORDER — METHOCARBAMOL 500 MG/1
500 TABLET, FILM COATED ORAL
Qty: 20 TABLET | Refills: 2 | Status: SHIPPED | OUTPATIENT
Start: 2025-02-10 | End: 2025-02-12

## 2025-02-11 ENCOUNTER — RESULTS FOLLOW-UP (OUTPATIENT)
Dept: FAMILY MEDICINE CLINIC | Facility: CLINIC | Age: 65
End: 2025-02-11

## 2025-02-11 DIAGNOSIS — E03.9 HYPOTHYROIDISM, UNSPECIFIED TYPE: ICD-10-CM

## 2025-02-11 DIAGNOSIS — E03.9 HYPOTHYROIDISM, UNSPECIFIED TYPE: Primary | ICD-10-CM

## 2025-02-11 RX ORDER — LEVOTHYROXINE SODIUM 150 MCG
150 TABLET ORAL DAILY
Qty: 30 TABLET | Refills: 3 | Status: SHIPPED | OUTPATIENT
Start: 2025-02-11 | End: 2025-03-13

## 2025-02-12 ENCOUNTER — OFFICE VISIT (OUTPATIENT)
Dept: FAMILY MEDICINE CLINIC | Facility: CLINIC | Age: 65
End: 2025-02-12
Payer: COMMERCIAL

## 2025-02-12 VITALS
WEIGHT: 140.2 LBS | SYSTOLIC BLOOD PRESSURE: 122 MMHG | BODY MASS INDEX: 24.84 KG/M2 | TEMPERATURE: 97.3 F | HEART RATE: 77 BPM | OXYGEN SATURATION: 97 % | HEIGHT: 63 IN | DIASTOLIC BLOOD PRESSURE: 72 MMHG

## 2025-02-12 DIAGNOSIS — M62.830 SPASM OF MUSCLE OF LOWER BACK: ICD-10-CM

## 2025-02-12 PROCEDURE — 99213 OFFICE O/P EST LOW 20 MIN: CPT | Performed by: FAMILY MEDICINE

## 2025-02-12 RX ORDER — METHOCARBAMOL 500 MG/1
500 TABLET, FILM COATED ORAL 3 TIMES DAILY PRN
Qty: 30 TABLET | Refills: 1 | Status: SHIPPED | OUTPATIENT
Start: 2025-02-12

## 2025-02-12 RX ORDER — PREDNISONE 10 MG/1
TABLET ORAL
Qty: 26 TABLET | Refills: 0 | Status: SHIPPED | OUTPATIENT
Start: 2025-02-12

## 2025-02-13 ENCOUNTER — HOSPITAL ENCOUNTER (OUTPATIENT)
Dept: RADIOLOGY | Facility: HOSPITAL | Age: 65
End: 2025-02-13
Attending: PODIATRIST
Payer: COMMERCIAL

## 2025-02-13 ENCOUNTER — OFFICE VISIT (OUTPATIENT)
Dept: PODIATRY | Facility: CLINIC | Age: 65
End: 2025-02-13
Payer: COMMERCIAL

## 2025-02-13 VITALS — WEIGHT: 140 LBS | OXYGEN SATURATION: 99 % | BODY MASS INDEX: 24.8 KG/M2 | HEIGHT: 63 IN | HEART RATE: 87 BPM

## 2025-02-13 DIAGNOSIS — S92.902A CLOSED FRACTURE OF LEFT FOOT, INITIAL ENCOUNTER: ICD-10-CM

## 2025-02-13 DIAGNOSIS — S82.839A AVULSION FRACTURE OF DISTAL END OF FIBULA: ICD-10-CM

## 2025-02-13 DIAGNOSIS — S82.839A AVULSION FRACTURE OF DISTAL END OF FIBULA: Primary | ICD-10-CM

## 2025-02-13 PROCEDURE — 99213 OFFICE O/P EST LOW 20 MIN: CPT | Performed by: PODIATRIST

## 2025-02-13 PROCEDURE — 73610 X-RAY EXAM OF ANKLE: CPT

## 2025-02-13 PROCEDURE — 73630 X-RAY EXAM OF FOOT: CPT

## 2025-02-13 NOTE — PROGRESS NOTES
Assessment/Plan:     The patient's clinical examination today significant for ecchymosis along the base of the left great toe where there is mild visual tenderness to palpation along the fracture site at the base of the proximal phalanx of the left hallux.  Passive range of motion of the left great toe joint was nontender and without crepitus.  There is no tenderness with palpation to the distal aspect of the left fibula with mild edema and very mild ecchymosis noted.  There is no tenderness with palpation of the anterior and medial ankle gutters.  Pedal pulses are palpable.  There are no open lesions.     Repeat x-rays of the patient's left foot and ankle were personally reviewed and interpreted.  There is a mildly displaced chip fracture at the base of the proximal phalanx that remains relatively stable.  The chip fracture of the distal fibula is no longer visible on plain film study today.     The patient is doing very well from a clinical standpoint.  The cam boot and crutch has been throwing off her lower back exacerbating some significant back pain which required prednisone therapy.  Will transition her out of the cam boot today and back into a supportive shoe or stiff soled sneaker as tolerated.  She can discontinue use of the crutches once her pain is under good control.    Recommend follow-up in 4 weeks for repeat x-rays of the left foot.     Diagnoses and all orders for this visit:    Avulsion fracture of distal end of fibula  -     XR ankle 3+ vw left; Future    Closed fracture of left foot, initial encounter  -     XR foot 3+ vw left; Future          Subjective:     Patient ID: Lisa Canseco is a 64 y.o. female.    The patient presents today for follow up of a left great toe fracture and suspected left ankle fracture.  The patient has been doing well from a clinical standpoint.  She notes good interval reduction in pain and swelling to her left foot and ankle.  She notes no significant tenderness in the  left ankle today.  Pain to the left great toe joint is also much improved.      PAST MEDICAL HISTORY:  Past Medical History:   Diagnosis Date    Anxiety 2000    Many years,ocd,meds started year 2000    Cataract, right     Closed fracture of right distal fibula 3/22/2022    Detached retina, right     Disease of thyroid gland     OCD (obsessive compulsive disorder)     Seasonal allergies     Wrist fracture        PAST SURGICAL HISTORY:  Past Surgical History:   Procedure Laterality Date    CATARACT EXTRACTION      EYE SURGERY      FOOT SURGERY      MANDIBLE FRACTURE SURGERY      TONSILLECTOMY          ALLERGIES:  Patient has no known allergies.    MEDICATIONS:  Current Outpatient Medications   Medication Sig Dispense Refill    Denosumab (PROLIA SC) Inject under the skin      methocarbamol (ROBAXIN) 500 mg tablet Take 1 tablet (500 mg total) by mouth 3 (three) times a day as needed for muscle spasms 30 tablet 1    predniSONE 10 mg tablet 3 tabs po bid x2 days, then 2 tabs po bid x2 days, then 1 tab bid x2 days, then 1 daily until done. 26 tablet 0    sertraline (ZOLOFT) 100 mg tablet TAKE ONE TABLET BY MOUTH EVERY DAY 90 tablet 1    Synthroid 150 MCG tablet Take 1 tablet (150 mcg total) by mouth daily 30 tablet 3    VITAMIN D, CHOLECALCIFEROL, PO Take by mouth      valACYclovir (VALTREX) 1,000 mg tablet Take 1 tablet (1,000 mg total) by mouth 2 (two) times a day as needed (cold sores) 60 tablet 1     No current facility-administered medications for this visit.       SOCIAL HISTORY:  Social History     Socioeconomic History    Marital status: /Civil Union     Spouse name: None    Number of children: None    Years of education: None    Highest education level: None   Occupational History    None   Tobacco Use    Smoking status: Never    Smokeless tobacco: Never   Vaping Use    Vaping status: Never Used   Substance and Sexual Activity    Alcohol use: Yes     Alcohol/week: 2.0 standard drinks of alcohol     Types: 2  Cans of beer per week     Comment: social drinking, not often    Drug use: Never    Sexual activity: Never   Other Topics Concern    None   Social History Narrative    Daily coffee    Denied cola    Denied tea     Social Drivers of Health     Financial Resource Strain: Not on file   Food Insecurity: Not on file   Transportation Needs: Not on file   Physical Activity: Not on file   Stress: Not on file   Social Connections: Not on file   Intimate Partner Violence: Not on file   Housing Stability: Not on file        Review of Systems   Constitutional: Negative.    HENT: Negative.     Eyes: Negative.    Respiratory: Negative.     Cardiovascular: Negative.    Endocrine: Negative.    Musculoskeletal: Negative.    Neurological: Negative.    Hematological: Negative.    Psychiatric/Behavioral: Negative.           Objective:     Physical Exam  Constitutional:       Appearance: Normal appearance.   HENT:      Head: Normocephalic and atraumatic.      Nose: Nose normal.   Cardiovascular:      Pulses:           Dorsalis pedis pulses are 2+ on the left side.        Posterior tibial pulses are 2+ on the left side.   Pulmonary:      Effort: Pulmonary effort is normal.   Feet:      Left foot:      Skin integrity: Skin integrity normal.      Comments: The patient's clinical examination today significant for ecchymosis along the base of the left great toe where there is mild visual tenderness to palpation along the fracture site at the base of the proximal phalanx of the left hallux.  Passive range of motion of the left great toe joint was nontender and without crepitus.  There is no tenderness with palpation to the distal aspect of the left fibula with mild edema and very mild ecchymosis noted.  There is no tenderness with palpation of the anterior and medial ankle gutters.  Pedal pulses are palpable.  There are no open lesions.     Skin:     General: Skin is warm.      Capillary Refill: Capillary refill takes less than 2 seconds.    Neurological:      General: No focal deficit present.      Mental Status: She is alert and oriented to person, place, and time.   Psychiatric:         Mood and Affect: Mood normal.         Behavior: Behavior normal.         Thought Content: Thought content normal.

## 2025-02-13 NOTE — PROGRESS NOTES
Assessment/Plan:      Diagnoses and all orders for this visit:    Spasm of muscle of lower back  -     methocarbamol (ROBAXIN) 500 mg tablet; Take 1 tablet (500 mg total) by mouth 3 (three) times a day as needed for muscle spasms  -     predniSONE 10 mg tablet; 3 tabs po bid x2 days, then 2 tabs po bid x2 days, then 1 tab bid x2 days, then 1 daily until done.          Subjective:     Patient ID: Lisa Canseco is a 64 y.o. female.    The patient presents complaining of lumbar back pain.  Patient broke her left foot and has been using a cam walker in a surgical shoe and has been limping.  Now she has low back pain extremely tight on the right and experiencing spasms in the low back muscles on the right side.  She has no radiation of pain down the leg paresthesia of the right leg or weakness of the leg.    Back Pain  This is a new problem. The current episode started 1 to 4 weeks ago. The problem occurs constantly. The problem is unchanged. The pain is present in the lumbar spine. The quality of the pain is described as cramping. The pain does not radiate. The pain is at a severity of 5/10. The pain is moderate. The pain is Worse during the day. The symptoms are aggravated by twisting, standing and position. Stiffness is present In the morning. Pertinent negatives include no abdominal pain, bladder incontinence, bowel incontinence, chest pain, dysuria, fever, headaches, leg pain, numbness, paresis, paresthesias, pelvic pain, perianal numbness, tingling, weakness or weight loss. Risk factors include recent trauma. She has tried heat and chiropractic manipulation for the symptoms. The treatment provided no relief.       Review of Systems   Constitutional:  Negative for appetite change, chills, fever and weight loss.   HENT:  Negative for ear pain, facial swelling, rhinorrhea, sinus pain, sore throat and trouble swallowing.    Eyes:  Negative for discharge and redness.   Respiratory:  Negative for chest tightness,  shortness of breath and wheezing.    Cardiovascular:  Negative for chest pain and palpitations.   Gastrointestinal:  Negative for abdominal pain, bowel incontinence, diarrhea, nausea and vomiting.   Endocrine: Negative for polyuria.   Genitourinary:  Negative for bladder incontinence, dysuria, pelvic pain and urgency.   Musculoskeletal:  Positive for back pain and myalgias. Negative for arthralgias.        Positive tightness and spasms of the lumbar musculature right greater than left with impaired range of motion secondary to pain and no radiation of pain down the legs   Skin:  Negative for rash.   Neurological:  Negative for dizziness, tingling, weakness, numbness, headaches and paresthesias.   Hematological:  Negative for adenopathy.   Psychiatric/Behavioral:  Negative for behavioral problems, confusion and sleep disturbance.    All other systems reviewed and are negative.        Objective:     Physical Exam  Constitutional:       General: She is not in acute distress.     Appearance: She is well-developed.   HENT:      Head: Normocephalic and atraumatic.      Right Ear: External ear normal.      Left Ear: External ear normal.      Nose: Nose normal.      Mouth/Throat:      Pharynx: No oropharyngeal exudate.   Eyes:      General: No scleral icterus.        Right eye: No discharge.         Left eye: No discharge.      Conjunctiva/sclera: Conjunctivae normal.      Pupils: Pupils are equal, round, and reactive to light.   Neck:      Thyroid: No thyromegaly.      Vascular: No JVD.      Trachea: No tracheal deviation.   Cardiovascular:      Rate and Rhythm: Normal rate and regular rhythm.      Heart sounds: Normal heart sounds. No murmur heard.  Pulmonary:      Effort: No respiratory distress.      Breath sounds: Normal breath sounds. No stridor. No wheezing or rales.   Abdominal:      General: Bowel sounds are normal. There is no distension.      Palpations: Abdomen is soft. There is no mass.      Tenderness: There  is no abdominal tenderness. There is no guarding or rebound.   Musculoskeletal:         General: Tenderness present. Normal range of motion.      Cervical back: Normal range of motion and neck supple.      Comments: Positive increased paravertebral muscle tension right para spinal lumbar musculature on palpation with spasms noted on the right compared to left unable to perform range of motion of lumbar spine secondary to pain and spasm.     Lymphadenopathy:      Cervical: No cervical adenopathy.   Skin:     General: Skin is warm.      Findings: No erythema or rash.   Neurological:      Mental Status: She is alert and oriented to person, place, and time.      Cranial Nerves: No cranial nerve deficit.      Motor: No abnormal muscle tone.      Coordination: Coordination normal.      Deep Tendon Reflexes: Reflexes are normal and symmetric. Reflexes normal.   Psychiatric:         Behavior: Behavior normal.         Thought Content: Thought content normal.         Judgment: Judgment normal.

## 2025-02-14 ENCOUNTER — APPOINTMENT (OUTPATIENT)
Dept: RADIOLOGY | Facility: MEDICAL CENTER | Age: 65
End: 2025-02-14
Payer: COMMERCIAL

## 2025-02-14 DIAGNOSIS — M54.50 LUMBAR BACK PAIN: ICD-10-CM

## 2025-02-14 DIAGNOSIS — M62.830 SPASM OF MUSCLE OF LOWER BACK: Primary | ICD-10-CM

## 2025-02-14 PROCEDURE — 72110 X-RAY EXAM L-2 SPINE 4/>VWS: CPT

## 2025-02-14 RX ORDER — OXYCODONE AND ACETAMINOPHEN 5; 325 MG/1; MG/1
1 TABLET ORAL EVERY 4 HOURS PRN
Qty: 40 TABLET | Refills: 0 | Status: SHIPPED | OUTPATIENT
Start: 2025-02-14

## 2025-02-17 ENCOUNTER — RESULTS FOLLOW-UP (OUTPATIENT)
Dept: FAMILY MEDICINE CLINIC | Facility: CLINIC | Age: 65
End: 2025-02-17

## 2025-02-17 DIAGNOSIS — M54.50 LUMBAR BACK PAIN: Primary | ICD-10-CM

## 2025-02-17 RX ORDER — CELECOXIB 200 MG/1
200 CAPSULE ORAL 2 TIMES DAILY
Qty: 60 CAPSULE | Refills: 3 | Status: SHIPPED | OUTPATIENT
Start: 2025-02-17

## 2025-02-17 RX ORDER — OXYCODONE AND ACETAMINOPHEN 5; 325 MG/1; MG/1
1 TABLET ORAL EVERY 6 HOURS PRN
Qty: 20 TABLET | Refills: 0 | Status: SHIPPED | OUTPATIENT
Start: 2025-02-17

## 2025-02-18 ENCOUNTER — OFFICE VISIT (OUTPATIENT)
Dept: OBGYN CLINIC | Facility: HOSPITAL | Age: 65
End: 2025-02-18
Attending: FAMILY MEDICINE
Payer: COMMERCIAL

## 2025-02-18 ENCOUNTER — HOSPITAL ENCOUNTER (OUTPATIENT)
Dept: RADIOLOGY | Facility: HOSPITAL | Age: 65
Discharge: HOME | End: 2025-02-18
Attending: ORTHOPAEDIC SURGERY
Payer: COMMERCIAL

## 2025-02-18 VITALS — WEIGHT: 139.99 LBS | HEIGHT: 63 IN | BODY MASS INDEX: 24.8 KG/M2

## 2025-02-18 DIAGNOSIS — R52 PAIN: Primary | ICD-10-CM

## 2025-02-18 DIAGNOSIS — R52 PAIN: ICD-10-CM

## 2025-02-18 DIAGNOSIS — Z78.0 POST-MENOPAUSAL: ICD-10-CM

## 2025-02-18 DIAGNOSIS — M54.50 LUMBAR BACK PAIN: ICD-10-CM

## 2025-02-18 DIAGNOSIS — M43.17 SPONDYLOLISTHESIS AT L5-S1 LEVEL: ICD-10-CM

## 2025-02-18 PROCEDURE — 99214 OFFICE O/P EST MOD 30 MIN: CPT | Performed by: ORTHOPAEDIC SURGERY

## 2025-02-18 PROCEDURE — 72100 X-RAY EXAM L-S SPINE 2/3 VWS: CPT

## 2025-02-18 NOTE — PROGRESS NOTES
Assessment & Plan/Medical Decision Makin y.o. female with Back Pain and imaging findings most notable for Lumbar Spondylosis, L5-S1 spondylolisthesis        The clinical, physical and imaging findings were reviewed with the patient.  Lisa  has a constellation of findings consistent with lumbar degenerative disc disease.      Physical exam showing decreased lumbar ROM, no weakness. Fortunately patient remains neurologically intact and functional.   We discussed the treatment options including physical therapy, at home exercises, activity modifications, chiropractic medicine, oral medications, interventional spine procedures.  At this time recommend continued conservative treatments.    Referral placed for physical therapy to work on core strengthening, lumbar ROM, strengthening, and stretching exercises.  Given severe back pain in setting L5-S1 spondylolisthesis and has attempted chiropractic care, will plan to obtain MRI lumbar spine to further evaluate patient's symptoms. Will review MRI in detail with patient at follow-up visit and discuss further treatment options at that time.  Referral to pain management for evaluation and treatment. Discussed potential role of steroid injection at or near the source of pain to provide targeted relief.  Will plan to obtain DEXA scan to evaluate bone mineral density.  Continue with medications as previously prescribed if providing pain/symptom relief.    Patient instructed to return to office/ER sooner if symptoms are not improving, getting worse, or new worrisome/neurologic symptoms arise.  Patient will follow up after lumbar MRI    Subjective:      Chief Complaint: Back Pain    HPI:  Lisa Canseco is a 64 y.o. female presenting for initial visit with chief complaint of back pain. Reports falling about 1 month ago and fractured her left foot/ankle for which podiatry has been managing. She reports intermittent back pain in the past which has been managed with  conservative treatment. Currently reports significant, debilitating back pain about 2 weeks ago without inciting injury or trauma aside from her gait being off due to her ankle fracture. Reports continued severe muscle spasms in her back, unable to bend forward. Currently she is having severe left-sided muscle spasms, however this does vary.  Difficulty standing, walking and moving due to pain. Has had some mild improvement today after taking pain medication last night, however pain is still present and bothersome. Unable to lay down in bed due to increased pain, has been sleeping sitting up. Unable to drive due to pain. Denies radiation of pain or numbness/tingling into lower extremities. Denies lower extremity weakness. Denies any theresa trauma. Denies fever or chills, no night sweats. Denies any bladder or bowel changes.      Denies heart or lung disease. Denies diabetes or kidney disease. PMH osteoporosis on Prolia.    Conservative therapy includes the following:   Medications: robaxin with some relief, percocet, oral steroid    Injections: no injections lumbar spine   2/26/2021 - C7-T1 ILESI     12/22/2020 - C7-T1 ILESI     Physical Therapy: no recent physical therapy  Chiropractic Medicine: has attempted  Accupunture/Massage Therapy: has not attempted   These therapeutic modalities were ineffective at providing sustained pain relief/functional improvement.     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, part time - out of work currently  Living situation: Lives with family   ADLs: difficulty performing ADLs due to pain    Objective:     Family History   Problem Relation Age of Onset    Heart disease Mother         cardiac    Colon cancer Father 65    Heart disease Father     OCD Father     No Known Problems Sister     No Known Problems Daughter     No Known Problems Maternal Grandmother     No Known Problems Maternal Grandfather     No Known Problems Paternal Grandmother     Prostate cancer  Paternal Grandfather     No Known Problems Sister     No Known Problems Daughter     No Known Problems Maternal Aunt     No Known Problems Paternal Aunt     No Known Problems Paternal Aunt        Past Medical History:   Diagnosis Date    Anxiety 2000    Many years,ocd,meds started year 2000    Cataract, right     Closed fracture of right distal fibula 3/22/2022    Detached retina, right     Disease of thyroid gland     OCD (obsessive compulsive disorder)     Seasonal allergies     Wrist fracture        Current Outpatient Medications   Medication Sig Dispense Refill    acetaminophen (TYLENOL) 325 mg suppository Insert 325 mg into the rectum every 4 (four) hours as needed for mild pain      celecoxib (CeleBREX) 200 mg capsule Take 1 capsule (200 mg total) by mouth 2 (two) times a day 60 capsule 3    Denosumab (PROLIA SC) Inject under the skin      oxyCODONE-acetaminophen (Percocet) 5-325 mg per tablet Take 1 tablet by mouth every 4 (four) hours as needed for moderate pain Max Daily Amount: 6 tablets 40 tablet 0    sertraline (ZOLOFT) 100 mg tablet TAKE ONE TABLET BY MOUTH EVERY DAY 90 tablet 1    Synthroid 150 MCG tablet Take 1 tablet (150 mcg total) by mouth daily 30 tablet 3    VITAMIN D, CHOLECALCIFEROL, PO Take by mouth      methocarbamol (ROBAXIN) 500 mg tablet Take 1 tablet (500 mg total) by mouth 3 (three) times a day as needed for muscle spasms (Patient not taking: Reported on 2/18/2025) 30 tablet 1    oxyCODONE-acetaminophen (Percocet) 5-325 mg per tablet Take 1 tablet by mouth every 6 (six) hours as needed for moderate pain Max Daily Amount: 4 tablets (Patient not taking: Reported on 2/18/2025) 20 tablet 0    predniSONE 10 mg tablet 3 tabs po bid x2 days, then 2 tabs po bid x2 days, then 1 tab bid x2 days, then 1 daily until done. (Patient not taking: Reported on 2/18/2025) 26 tablet 0    valACYclovir (VALTREX) 1,000 mg tablet Take 1 tablet (1,000 mg total) by mouth 2 (two) times a day as needed (cold sores)  "60 tablet 1     No current facility-administered medications for this visit.       Past Surgical History:   Procedure Laterality Date    CATARACT EXTRACTION      EYE SURGERY      FOOT SURGERY      MANDIBLE FRACTURE SURGERY      TONSILLECTOMY         Social History     Socioeconomic History    Marital status: /Civil Union     Spouse name: Not on file    Number of children: Not on file    Years of education: Not on file    Highest education level: Not on file   Occupational History    Not on file   Tobacco Use    Smoking status: Never    Smokeless tobacco: Never   Vaping Use    Vaping status: Never Used   Substance and Sexual Activity    Alcohol use: Yes     Alcohol/week: 2.0 standard drinks of alcohol     Types: 2 Cans of beer per week     Comment: social drinking, not often    Drug use: Never    Sexual activity: Never   Other Topics Concern    Not on file   Social History Narrative    Daily coffee    Denied cola    Denied tea     Social Drivers of Health     Financial Resource Strain: Not on file   Food Insecurity: Not on file   Transportation Needs: Not on file   Physical Activity: Not on file   Stress: Not on file   Social Connections: Not on file   Intimate Partner Violence: Not on file   Housing Stability: Not on file       No Known Allergies    Review of Systems  General- denies fever/chills  HEENT- denies hearing loss or sore throat  Eyes- denies eye pain or visual disturbances, denies red eyes  Respiratory- denies cough or SOB  Cardio- denies chest pain or palpitations  GI- denies abdominal pain  Endocrine- denies urinary frequency  Urinary- denies pain with urination  Musculoskeletal- Negative except noted above  Skin- denies rashes or wounds  Neurological- denies dizziness or headache  Psychiatric- denies anxiety or difficulty concentrating    Physical Exam  Ht 5' 3\" (1.6 m)   Wt 63.5 kg (139 lb 15.9 oz)   BMI 24.80 kg/m²     General/Constitutional: No apparent distress: well-nourished and well " "developed.  Lymphatic: No appreciable lymphadenopathy  Respiratory: Non-labored breathing  Vascular: No edema, swelling or tenderness, except as noted in detailed exam.  Integumentary: No impressive skin lesions present, except as noted in detailed exam.  Psych: Normal mood and affect, oriented to person, place and time.  MSK: normal other than stated in HPI and exam  Gait & balance: no evidence of myelopathic gait, ambulates Independently     Lumbar spine range of motion:  -Forward flexion to 30  -Extension to neutral  There is mild tenderness with palpation along lumbar paraspinal musculature, no midline tenderness     Neurologic:  Lower Extremity Motor Function    Right  Left    Iliopsoas  5/5  5/5    Quadriceps 5/5 5/5   Tibialis anterior  5/5  5/5    EHL  5/5  5/5    Gastroc. muscle  5/5  5/5    Heel rise  5/5  5/5    Toe rise  5/5  5/5      Sensory: light touch is intact to bilateral lower extremities     Reflexes:  Intact, brisk    Other tests:  Straight Leg Raise: negative  Sierra SI: negative  ADA SI: negative  Greater troch: no tenderness   Internal/external hip ROM: intact, no pain   Flexion/extension knee ROM: intact, no pain   Vascular: WWP extremities    Diagnostic Tests   IMAGING: I have personally reviewed the images and these are my findings:  Lumbar Spine X-rays from 2/14/2025 and 2/18/2025: multi level lumbar spondylosis with loss of disc height, osteophyte formation and facet hypertrophy, L5-S1 spondylolisthesis with probable pars defect, no appreciated lytic/blastic lesions, no obvious instability    Electronic Medical Records were reviewed including office notes, imaging studies, radiology reports    Procedures, if performed today     None performed       Portions of the record may have been created with voice recognition software.  Occasional wrong word or \"sound a like\" substitutions may have occurred due to the inherent limitations of voice recognition software.  Read the chart carefully " and recognize, using context, where substitutions have occurred.

## 2025-02-22 ENCOUNTER — HOSPITAL ENCOUNTER (EMERGENCY)
Facility: HOSPITAL | Age: 65
Discharge: HOME | End: 2025-02-22
Attending: EMERGENCY MEDICINE | Admitting: EMERGENCY MEDICINE
Payer: COMMERCIAL

## 2025-02-22 VITALS
DIASTOLIC BLOOD PRESSURE: 74 MMHG | SYSTOLIC BLOOD PRESSURE: 174 MMHG | OXYGEN SATURATION: 99 % | HEART RATE: 64 BPM | TEMPERATURE: 98.2 F | RESPIRATION RATE: 16 BRPM

## 2025-02-22 DIAGNOSIS — M62.830 LUMBAR PARASPINAL MUSCLE SPASM: Primary | ICD-10-CM

## 2025-02-22 PROCEDURE — 99282 EMERGENCY DEPT VISIT SF MDM: CPT

## 2025-02-22 PROCEDURE — 99284 EMERGENCY DEPT VISIT MOD MDM: CPT | Performed by: EMERGENCY MEDICINE

## 2025-02-22 PROCEDURE — 96372 THER/PROPH/DIAG INJ SC/IM: CPT

## 2025-02-22 RX ORDER — KETOROLAC TROMETHAMINE 30 MG/ML
30 INJECTION, SOLUTION INTRAMUSCULAR; INTRAVENOUS ONCE
Status: COMPLETED | OUTPATIENT
Start: 2025-02-22 | End: 2025-02-22

## 2025-02-22 RX ORDER — DIAZEPAM 10 MG/2ML
5 INJECTION, SOLUTION INTRAMUSCULAR; INTRAVENOUS ONCE
Status: COMPLETED | OUTPATIENT
Start: 2025-02-22 | End: 2025-02-22

## 2025-02-22 RX ORDER — LIDOCAINE 4 G/G
1 PATCH TOPICAL EVERY 12 HOURS PRN
Qty: 20 PATCH | Refills: 0 | Status: SHIPPED | OUTPATIENT
Start: 2025-02-22

## 2025-02-22 RX ORDER — DIAZEPAM 5 MG/1
5 TABLET ORAL EVERY 8 HOURS PRN
Qty: 15 TABLET | Refills: 0 | Status: SHIPPED | OUTPATIENT
Start: 2025-02-22 | End: 2025-02-22

## 2025-02-22 RX ORDER — LIDOCAINE 50 MG/G
2 PATCH TOPICAL ONCE
Status: DISCONTINUED | OUTPATIENT
Start: 2025-02-22 | End: 2025-02-22 | Stop reason: HOSPADM

## 2025-02-22 RX ORDER — DIAZEPAM 5 MG/1
5 TABLET ORAL EVERY 8 HOURS PRN
Qty: 15 TABLET | Refills: 0 | Status: SHIPPED | OUTPATIENT
Start: 2025-02-22 | End: 2025-03-04

## 2025-02-22 RX ADMIN — LIDOCAINE 2 PATCH: 50 PATCH CUTANEOUS at 10:57

## 2025-02-22 RX ADMIN — KETOROLAC TROMETHAMINE 30 MG: 30 INJECTION, SOLUTION INTRAMUSCULAR; INTRAVENOUS at 10:57

## 2025-02-22 RX ADMIN — DIAZEPAM 5 MG: 10 INJECTION, SOLUTION INTRAMUSCULAR; INTRAVENOUS at 10:58

## 2025-02-22 NOTE — ED ATTENDING ATTESTATION
2/22/2025  IBhavani DO, saw and evaluated the patient. I have discussed the patient with the resident/non-physician practitioner and agree with the resident's/non-physician practitioner's findings, Plan of Care, and MDM as documented in the resident's/non-physician practitioner's note, except where noted. All available labs and Radiology studies were reviewed.  I was present for key portions of any procedure(s) performed by the resident/non-physician practitioner and I was immediately available to provide assistance.       At this point I agree with the current assessment done in the Emergency Department.  I have conducted an independent evaluation of this patient a history and physical is as follows:    ED Course   64-year-old female presents to the emergency department for evaluation of low back pain.  Patient states that she has chronic back pain however feels that she tweaked her low back this morning when she was stepping off of a porch.  She localizes discomfort to the lumbar region without radiation.  The back feels tight and she experiences sharp pains when she attempts range of motion of the lower spine.  Patient has been following with pain management and spine and has an MRI scheduled for Tuesday.  Patient denies any bowel or bladder dysfunction.  She denies weakness or numbness of the lower extremities.  She is ambulating without difficulty.  She denies recent fall or trauma than having acute back pain after stepping off of her porch.    Past medical history: Hypothyroidism, chronic low back pain, depression    Physical exam: Patient is awake and alert standing at the bedside in mild distress due to pain.  Pupils are equal and reactive.  Cervical spine is nontender with normal range of motion.  No tenderness in the midline of the thoracic spine.  There is palpable lumbar paraspinal muscle tenderness bilateral.  No midline spinous process tenderness.  Range of motion is decreased in flexion and  extension due to pain.  Normal reflexes noted.  Patient's gait is steady.  No focal motor or sensory deficits.    Assessment/plan: 64-year-old female presents with acute flare of chronic low back pain.  Patient's pain is localized and nonradiating.  Differential diagnosis includes is not limited to acute muscle strain, disc herniation, nerve impingement, compression fracture.    Will treat pain with NSAID and muscle relaxer, topical lidocaine patch, reassess.  No new trauma or indication for imaging.  Did have lumbar spine imaging last week and has an MRI scheduled for next week.    Critical Care Time  Procedures

## 2025-02-22 NOTE — DISCHARGE INSTRUCTIONS
Please do not take Valium and Percocet at the same time.    Continue Celebrex as prescribed, Lidocaine patch every 12 hours as needed for pain. Do not wear for more than 12 hours at a time. May also apply heat/cold as needed for discomfort.    Please follow up with your doctors for further management of your back pain and avoid physical exertion.

## 2025-02-22 NOTE — ED PROVIDER NOTES
Time reflects when diagnosis was documented in both MDM as applicable and the Disposition within this note       Time User Action Codes Description Comment    2/22/2025 11:55 AM Zoya Vinson Add [M62.830] Lumbar paraspinal muscle spasm           ED Disposition       ED Disposition   Discharge    Condition   Stable    Date/Time   Sat Feb 22, 2025 11:55 AM    Comment   Lisa Canseco discharge to home/self care.                   Assessment & Plan       Medical Decision Making  Risk  OTC drugs.  Prescription drug management.    Pt is a 63 yo F w/ hx of chronic back pain presenting with muscle spasm after stepping wrong and aggravating her back. On exam, pt has no red flag symptoms concerning for cauda equina, no radiation so doubt radiculopathy or sciatic pain, but has bilateral paraspinal lumbar pain. Suspect her symptoms are due to muscle spasms, and her attempt with percocet and robaxin at home did not help. She got a dose of Valium, lidocaine patch, and Toradol injection with improvement of her symptoms. Will send rx of valium and instruct patient to avoid taking percocet and valium at the same time, as well as other supportive meds. She has an appointment and MRI scheduled for her spine and pain provider. Return precautions discussed such as difficulty ambulation, bowel or urinary incontinence. Pt voices understanding and agrees with plan. All questions and concerns addressed at this time.    DDx including but not limited to: sciatica, radiculopathy, strain, sprain, cauda equina syndrome     Medications   ketorolac (TORADOL) injection 30 mg (30 mg Intramuscular Given 2/22/25 1057)   diazepam (VALIUM) injection 5 mg (5 mg Intramuscular Given 2/22/25 1058)       ED Risk Strat Scores                                                History of Present Illness       Chief Complaint   Patient presents with    Back Pain     Worsening back pain mid to lower back, spasms, last night pulled again       Past Medical History:    Diagnosis Date    Anxiety 2000    Many years,ocd,meds started year 2000    Cataract, right     Closed fracture of right distal fibula 3/22/2022    Detached retina, right     Disease of thyroid gland     OCD (obsessive compulsive disorder)     Seasonal allergies     Wrist fracture       Past Surgical History:   Procedure Laterality Date    CATARACT EXTRACTION      EYE SURGERY      FOOT SURGERY      MANDIBLE FRACTURE SURGERY      TONSILLECTOMY        Family History   Problem Relation Age of Onset    Heart disease Mother         cardiac    Colon cancer Father 65    Heart disease Father     OCD Father     No Known Problems Sister     No Known Problems Daughter     No Known Problems Maternal Grandmother     No Known Problems Maternal Grandfather     No Known Problems Paternal Grandmother     Prostate cancer Paternal Grandfather     No Known Problems Sister     No Known Problems Daughter     No Known Problems Maternal Aunt     No Known Problems Paternal Aunt     No Known Problems Paternal Aunt       Social History     Tobacco Use    Smoking status: Never    Smokeless tobacco: Never   Vaping Use    Vaping status: Never Used   Substance Use Topics    Alcohol use: Yes     Alcohol/week: 2.0 standard drinks of alcohol     Types: 2 Cans of beer per week     Comment: social drinking, not often    Drug use: Never      E-Cigarette/Vaping    E-Cigarette Use Never User       E-Cigarette/Vaping Substances    Nicotine No     THC No     CBD No     Flavoring No     Other No     Unknown No       I have reviewed and agree with the history as documented.     HPI  Pt is a 65 yo F presenting with low back pain onset this AM. Pt states she was walking out of her porch when her L ankle gave out. She is able to catch herself but the motion jerked her back and pain started. Pain is located in the lower back, no radiation. No falls or trauma. States she tried robaxin and half of a percocet without relief. She has a hx of chronic back pain and  see spine and pain for injections. Denies fevers, chills, bowel or urinary incontinence, difficulty ambulation, weakenss, chest pain, SOB, or any other symptoms at this time.     Review of Systems  As per HPI      Objective       ED Triage Vitals   Temperature Pulse Blood Pressure Respirations SpO2 Patient Position - Orthostatic VS   02/22/25 0947 02/22/25 0943 02/22/25 0944 02/22/25 0944 02/22/25 0943 --   98.2 °F (36.8 °C) 64 (!) 174/74 16 99 %       Temp Source Heart Rate Source BP Location FiO2 (%) Pain Score    02/22/25 0947 02/22/25 0943 -- -- 02/22/25 0947    Oral Monitor   10 - Worst Possible Pain      Vitals      Date and Time Temp Pulse SpO2 Resp BP Pain Score FACES Pain Rating User   02/22/25 1057 -- -- -- -- -- 9 -- LD   02/22/25 0947 98.2 °F (36.8 °C) -- -- -- -- 10 - Worst Possible Pain -- AB   02/22/25 0944 -- -- -- 16 174/74 -- -- AB   02/22/25 0943 -- 64 99 % -- -- -- -- AB            Physical Exam  Vitals and nursing note reviewed.   Constitutional:       General: She is not in acute distress.     Appearance: She is not ill-appearing, toxic-appearing or diaphoretic.   HENT:      Head: Normocephalic and atraumatic.      Right Ear: External ear normal.      Left Ear: External ear normal.      Nose: Nose normal.      Mouth/Throat:      Mouth: Mucous membranes are moist.   Eyes:      General: No scleral icterus.     Extraocular Movements: Extraocular movements intact.      Conjunctiva/sclera: Conjunctivae normal.   Neck:      Comments: No c spine tenderness. No step offs or deformities.  Cardiovascular:      Rate and Rhythm: Normal rate and regular rhythm.      Pulses: Normal pulses.           Radial pulses are 2+ on the right side.        Dorsalis pedis pulses are 2+ on the right side and 2+ on the left side.      Heart sounds: Normal heart sounds, S1 normal and S2 normal. No murmur heard.  Pulmonary:      Effort: Pulmonary effort is normal. No respiratory distress.      Breath sounds: Normal breath  sounds. No stridor. No wheezing.   Abdominal:      Palpations: Abdomen is soft.      Tenderness: There is no abdominal tenderness. There is no guarding or rebound.   Musculoskeletal:         General: Normal range of motion.      Cervical back: Normal range of motion.      Comments: Tenderness to bilateral lumbar paraspinal muscles. No midline T or L spine tenderness. No step offs or deformities. Sensation intact to bilateral lower extremities. No saddle anesthesia. 3+ Patellar reflex. 2+ DP pulses.   Skin:     General: Skin is warm and dry.   Neurological:      General: No focal deficit present.      Mental Status: She is alert and oriented to person, place, and time.   Psychiatric:         Mood and Affect: Mood normal.         Results Reviewed       None            No orders to display       Procedures    ED Medication and Procedure Management   Prior to Admission Medications   Prescriptions Last Dose Informant Patient Reported? Taking?   Denosumab (PROLIA SC)  Self Yes No   Sig: Inject under the skin   Synthroid 150 MCG tablet  Self No No   Sig: Take 1 tablet (150 mcg total) by mouth daily   VITAMIN D, CHOLECALCIFEROL, PO  Self Yes No   Sig: Take by mouth   celecoxib (CeleBREX) 200 mg capsule   No No   Sig: Take 1 capsule (200 mg total) by mouth 2 (two) times a day   methocarbamol (ROBAXIN) 500 mg tablet   No No   Sig: Take 1 tablet (500 mg total) by mouth 3 (three) times a day as needed for muscle spasms   Patient not taking: Reported on 2/18/2025   oxyCODONE-acetaminophen (Percocet) 5-325 mg per tablet   No No   Sig: Take 1 tablet by mouth every 4 (four) hours as needed for moderate pain Max Daily Amount: 6 tablets   oxyCODONE-acetaminophen (Percocet) 5-325 mg per tablet   No No   Sig: Take 1 tablet by mouth every 6 (six) hours as needed for moderate pain Max Daily Amount: 4 tablets   Patient not taking: Reported on 2/18/2025   predniSONE 10 mg tablet   No No   Sig: 3 tabs po bid x2 days, then 2 tabs po bid x2  days, then 1 tab bid x2 days, then 1 daily until done.   Patient not taking: Reported on 2/18/2025   sertraline (ZOLOFT) 100 mg tablet  Self No No   Sig: TAKE ONE TABLET BY MOUTH EVERY DAY   valACYclovir (VALTREX) 1,000 mg tablet  Self No No   Sig: Take 1 tablet (1,000 mg total) by mouth 2 (two) times a day as needed (cold sores)      Facility-Administered Medications: None     Discharge Medication List as of 2/22/2025 11:58 AM        START taking these medications    Details   Lidocaine 4 % PTCH Apply 1 patch topically every 12 (twelve) hours as needed (as needed for pain), Starting Sat 2/22/2025, Normal           CONTINUE these medications which have CHANGED    Details   diazepam (VALIUM) 5 mg tablet Take 1 tablet (5 mg total) by mouth every 8 (eight) hours as needed for muscle spasms for up to 10 days, Starting Sat 2/22/2025, Until Tue 3/4/2025 at 2359, Normal           CONTINUE these medications which have NOT CHANGED    Details   celecoxib (CeleBREX) 200 mg capsule Take 1 capsule (200 mg total) by mouth 2 (two) times a day, Starting Mon 2/17/2025, Normal      Denosumab (PROLIA SC) Inject under the skin, Historical Med      methocarbamol (ROBAXIN) 500 mg tablet Take 1 tablet (500 mg total) by mouth 3 (three) times a day as needed for muscle spasms, Starting Wed 2/12/2025, Normal      !! oxyCODONE-acetaminophen (Percocet) 5-325 mg per tablet Take 1 tablet by mouth every 4 (four) hours as needed for moderate pain Max Daily Amount: 6 tablets, Starting Fri 2/14/2025, Normal      !! oxyCODONE-acetaminophen (Percocet) 5-325 mg per tablet Take 1 tablet by mouth every 6 (six) hours as needed for moderate pain Max Daily Amount: 4 tablets, Starting Mon 2/17/2025, Normal      predniSONE 10 mg tablet 3 tabs po bid x2 days, then 2 tabs po bid x2 days, then 1 tab bid x2 days, then 1 daily until done., Normal      sertraline (ZOLOFT) 100 mg tablet TAKE ONE TABLET BY MOUTH EVERY DAY, Normal      Synthroid 150 MCG tablet Take 1  tablet (150 mcg total) by mouth daily, Starting Tue 2/11/2025, Until Thu 3/13/2025, Normal      valACYclovir (VALTREX) 1,000 mg tablet Take 1 tablet (1,000 mg total) by mouth 2 (two) times a day as needed (cold sores), Starting Mon 3/4/2024, Until Wed 2/12/2025 at 2359, Normal      VITAMIN D, CHOLECALCIFEROL, PO Take by mouth, Historical Med       !! - Potential duplicate medications found. Please discuss with provider.        No discharge procedures on file.  ED SEPSIS DOCUMENTATION   Time reflects when diagnosis was documented in both MDM as applicable and the Disposition within this note       Time User Action Codes Description Comment    2/22/2025 11:55 AM Zoya Vinson Add [M62.830] Lumbar paraspinal muscle spasm                       Zoya Vinson MD  02/22/25 2787

## 2025-02-24 ENCOUNTER — OFFICE VISIT (OUTPATIENT)
Dept: FAMILY MEDICINE CLINIC | Facility: CLINIC | Age: 65
End: 2025-02-24
Payer: COMMERCIAL

## 2025-02-24 VITALS
HEIGHT: 63 IN | BODY MASS INDEX: 25.34 KG/M2 | SYSTOLIC BLOOD PRESSURE: 110 MMHG | HEART RATE: 83 BPM | OXYGEN SATURATION: 99 % | WEIGHT: 143 LBS | DIASTOLIC BLOOD PRESSURE: 64 MMHG | TEMPERATURE: 97.5 F

## 2025-02-24 DIAGNOSIS — M62.830 SPASM OF MUSCLE OF LOWER BACK: ICD-10-CM

## 2025-02-24 DIAGNOSIS — M54.50 LUMBAR BACK PAIN: ICD-10-CM

## 2025-02-24 PROCEDURE — 96372 THER/PROPH/DIAG INJ SC/IM: CPT | Performed by: FAMILY MEDICINE

## 2025-02-24 PROCEDURE — 99214 OFFICE O/P EST MOD 30 MIN: CPT | Performed by: FAMILY MEDICINE

## 2025-02-24 RX ORDER — OXYCODONE AND ACETAMINOPHEN 7.5; 325 MG/1; MG/1
1 TABLET ORAL EVERY 4 HOURS PRN
Qty: 40 TABLET | Refills: 0 | Status: SHIPPED | OUTPATIENT
Start: 2025-02-24

## 2025-02-24 RX ORDER — KETOROLAC TROMETHAMINE 30 MG/ML
60 INJECTION, SOLUTION INTRAMUSCULAR; INTRAVENOUS ONCE
Status: COMPLETED | OUTPATIENT
Start: 2025-02-24 | End: 2025-02-24

## 2025-02-24 RX ADMIN — KETOROLAC TROMETHAMINE 60 MG: 30 INJECTION, SOLUTION INTRAMUSCULAR; INTRAVENOUS at 12:54

## 2025-02-25 ENCOUNTER — HOSPITAL ENCOUNTER (OUTPATIENT)
Dept: RADIOLOGY | Facility: IMAGING CENTER | Age: 65
Discharge: HOME | End: 2025-02-25
Payer: COMMERCIAL

## 2025-02-25 DIAGNOSIS — M43.17 SPONDYLOLISTHESIS AT L5-S1 LEVEL: ICD-10-CM

## 2025-02-25 DIAGNOSIS — M54.50 LUMBAR BACK PAIN: ICD-10-CM

## 2025-02-25 PROCEDURE — 72148 MRI LUMBAR SPINE W/O DYE: CPT

## 2025-02-26 ENCOUNTER — TELEPHONE (OUTPATIENT)
Age: 65
End: 2025-02-26

## 2025-02-26 NOTE — TELEPHONE ENCOUNTER
Doctor: Lyle  Caller Name: Tony/radiology  CB#: [phone]     Radiology called to speak to clinical team regarding MRI ordered by provider.    *No call back necessary unless provider has questions.*

## 2025-02-27 ENCOUNTER — TELEPHONE (OUTPATIENT)
Dept: FAMILY MEDICINE CLINIC | Facility: CLINIC | Age: 65
End: 2025-02-27

## 2025-02-27 NOTE — TELEPHONE ENCOUNTER
States the provider is Dr. Rivers PA    MRI was not reviewed with pt- however she has an appt tomorrow with Dr. Alvarenga

## 2025-02-27 NOTE — PROGRESS NOTES
:  Assessment & Plan  Spasm of muscle of lower back  Patient is to continue with muscle relaxants as needed as well as cold packs to help with spasms and pain.       Lumbar back pain  Patient is to continue with physical therapy.  Orders:    oxyCODONE-acetaminophen (Percocet) 7.5-325 MG per tablet; Take 1 tablet by mouth every 4 (four) hours as needed for moderate pain Max Daily Amount: 6 tablets    ketorolac (TORADOL) 60 mg/2 mL IM injection 60 mg        History of Present Illness     Lisa Canseco is a 64 y.o. female   The patient presents after an ER visit for low back pain and lumbar muscle spasms.  She complains of intermittent spasms of the low back as well as chronic low back pain which is worse with movement, changing positions and weightbearing.  She is also experiencing muscle spasms intermittently at rest.  At the current time she has pain across her low back and occasionally radiating down the back of the right leg.  X-rays of the lumbar spine revealed that hhere is mild thoracolumbar scoliosis, and grade 1 anterolisthesis at L5-S1 which is similar to the prior study.Disc space narrowing present at L5-S1. 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  I wrote for physical therapy for the patient to start so we can ultimately get an MRI of the low back      Back Pain  This is a new problem. The current episode started 1 to 4 weeks ago. The problem occurs constantly. The problem has been gradually worsening since onset. The pain is present in the lumbar spine. The quality of the pain is described as stabbing and aching. Radiates to: Down the back of the right leg. The pain is at a severity of 8/10. The pain is moderate. The pain is The same all the time. The symptoms are aggravated by twisting, standing, position and bending. Stiffness is present All day. Associated symptoms include leg pain. Pertinent negatives include no abdominal pain, bladder incontinence, bowel incontinence, chest pain, dysuria, fever, headaches,  "numbness, paresis, paresthesias, pelvic pain, perianal numbness, tingling, weakness or weight loss. She has tried analgesics, bed rest, heat, ice, muscle relaxant and NSAIDs for the symptoms. The treatment provided no relief.     Review of Systems   Constitutional:  Negative for appetite change, chills, fever and weight loss.   HENT:  Negative for ear pain, facial swelling, rhinorrhea, sinus pain, sore throat and trouble swallowing.    Eyes:  Negative for discharge and redness.   Respiratory:  Negative for chest tightness, shortness of breath and wheezing.    Cardiovascular:  Negative for chest pain and palpitations.   Gastrointestinal:  Negative for abdominal pain, bowel incontinence, diarrhea, nausea and vomiting.   Endocrine: Negative for polyuria.   Genitourinary:  Negative for bladder incontinence, dysuria, pelvic pain and urgency.   Musculoskeletal:  Positive for back pain and gait problem. Negative for arthralgias.        Lumbar back pain with intermittent spasms of the lumbar back and pain radiating down the back of the right leg without weakness of the leg or instability.  Spasms tend to worsen with twisting bending changes in positions and ambulating.   Skin:  Negative for rash.   Neurological:  Negative for dizziness, tingling, weakness, numbness, headaches and paresthesias.   Hematological:  Negative for adenopathy.   Psychiatric/Behavioral:  Negative for behavioral problems, confusion and sleep disturbance.    All other systems reviewed and are negative.    Objective   /64   Pulse 83   Temp 97.5 °F (36.4 °C)   Ht 5' 3\" (1.6 m)   Wt 64.9 kg (143 lb)   SpO2 99%   BMI 25.33 kg/m²      Physical Exam  Vitals and nursing note reviewed.   Constitutional:       General: She is not in acute distress.     Appearance: Normal appearance. She is well-developed. She is not ill-appearing or diaphoretic.   HENT:      Head: Normocephalic and atraumatic.      Right Ear: Tympanic membrane, ear canal and " external ear normal.      Left Ear: Tympanic membrane, ear canal and external ear normal.      Nose: Nose normal. No congestion or rhinorrhea.      Mouth/Throat:      Mouth: Mucous membranes are moist.      Pharynx: Oropharynx is clear. No oropharyngeal exudate or posterior oropharyngeal erythema.   Eyes:      General: No scleral icterus.        Right eye: No discharge.         Left eye: No discharge.      Extraocular Movements: Extraocular movements intact.      Conjunctiva/sclera: Conjunctivae normal.      Pupils: Pupils are equal, round, and reactive to light.   Neck:      Thyroid: No thyromegaly.      Vascular: No carotid bruit or JVD.      Trachea: No tracheal deviation.   Cardiovascular:      Rate and Rhythm: Normal rate and regular rhythm.      Pulses: Normal pulses.      Heart sounds: Normal heart sounds. No murmur heard.  Pulmonary:      Effort: Pulmonary effort is normal. No respiratory distress.      Breath sounds: Normal breath sounds. No stridor. No wheezing, rhonchi or rales.   Abdominal:      General: Abdomen is flat. Bowel sounds are normal. There is no distension.      Palpations: Abdomen is soft. There is no mass.      Tenderness: There is no abdominal tenderness. There is no guarding or rebound.   Musculoskeletal:         General: Tenderness present. No swelling or deformity. Normal range of motion.      Cervical back: Normal range of motion and neck supple. No rigidity.      Right lower leg: No edema.      Left lower leg: No edema.      Comments: Positive lumbar back pain with palpation of the paraspinal muscle pillars bilaterally and tenderness worse on the right greater than left with positive straight leg raising at 30 degrees on the right   Lymphadenopathy:      Cervical: No cervical adenopathy.   Skin:     General: Skin is warm and dry.      Capillary Refill: Capillary refill takes less than 2 seconds.      Coloration: Skin is not jaundiced.      Findings: No bruising, erythema or rash.    Neurological:      General: No focal deficit present.      Mental Status: She is alert and oriented to person, place, and time.      Cranial Nerves: No cranial nerve deficit.      Sensory: No sensory deficit.      Motor: No abnormal muscle tone.      Coordination: Coordination normal.      Gait: Gait normal.      Deep Tendon Reflexes: Reflexes are normal and symmetric. Reflexes normal.   Psychiatric:         Mood and Affect: Mood normal.         Behavior: Behavior normal.         Thought Content: Thought content normal.         Judgment: Judgment normal.         Administrative Statements   I have spent a total time of 20 minutes in caring for this patient on the day of the visit/encounter including Diagnostic results, Prognosis, Risks and benefits of tx options, Instructions for management, Patient and family education, Importance of tx compliance, Risk factor reductions, and Impressions.

## 2025-02-28 ENCOUNTER — OFFICE VISIT (OUTPATIENT)
Dept: OBGYN CLINIC | Facility: HOSPITAL | Age: 65
End: 2025-02-28
Payer: COMMERCIAL

## 2025-02-28 VITALS — WEIGHT: 143.08 LBS | BODY MASS INDEX: 25.35 KG/M2 | HEIGHT: 63 IN

## 2025-02-28 DIAGNOSIS — M43.17 SPONDYLOLISTHESIS AT L5-S1 LEVEL: Primary | ICD-10-CM

## 2025-02-28 PROCEDURE — 99214 OFFICE O/P EST MOD 30 MIN: CPT | Performed by: ORTHOPAEDIC SURGERY

## 2025-02-28 RX ORDER — GABAPENTIN 300 MG/1
300 CAPSULE ORAL
Qty: 30 CAPSULE | Refills: 1 | Status: SHIPPED | OUTPATIENT
Start: 2025-02-28

## 2025-02-28 NOTE — PROGRESS NOTES
Assessment & Plan/Medical Decision Makin y.o. female with Back Pain and imaging findings most notable for Lumbar Spondylosis, L5-S1 spondylolisthesis        The clinical, physical and imaging findings were reviewed with the patient.  Lisa  has a constellation of findings consistent with lumbar degenerative disc disease.  We reviewed her lumbar MRI in detail which does demonstrate L5-S1 foraminal stenosis.    Physical exam showing decreased lumbar ROM, no weakness. Fortunately patient remains neurologically intact and functional, with primary complaint of low back pain/muscle spasms without sciatica.   We discussed the treatment options including physical therapy, at home exercises, activity modifications, chiropractic medicine, oral medications, interventional spine procedures.  At this time recommend continued conservative treatments.    Referral placed for physical therapy to work on core strengthening, lumbar ROM, strengthening, and stretching exercises.  Gabapentin rx sent to patient's pharmacy. Discussed reasoning for prescribing medication, proper usage, and potential side effects.  Continue with pain management for evaluation and treatment. Discussed potential role of steroid injection at or near the source of pain to provide targeted relief.      Patient instructed to return to office/ER sooner if symptoms are not improving, getting worse, or new worrisome/neurologic symptoms arise.  Patient will follow up after PT, injections in approx 6-12 weeks.     Subjective:      Chief Complaint: Back Pain    HPI:  Lisa Canseco is a 64 y.o. female presenting for initial visit with chief complaint of back pain. Reports falling about 1 month ago and fractured her left foot/ankle for which podiatry has been managing. She reports intermittent back pain in the past which has been managed with conservative treatment. Currently reports significant, debilitating back pain about 2 weeks ago without inciting injury or  trauma aside from her gait being off due to her ankle fracture. Reports continued severe muscle spasms in her back, unable to bend forward. Currently she is having severe left-sided muscle spasms, however this does vary.  Difficulty standing, walking and moving due to pain. Has had some mild improvement today after taking pain medication last night, however pain is still present and bothersome. Unable to lay down in bed due to increased pain, has been sleeping sitting up. Unable to drive due to pain. Denies radiation of pain or numbness/tingling into lower extremities. Denies lower extremity weakness. Denies any theresa trauma. Denies fever or chills, no night sweats. Denies any bladder or bowel changes.      Denies heart or lung disease. Denies diabetes or kidney disease. PMH osteoporosis on Prolia.    Conservative therapy includes the following:   Medications: robaxin with some relief, percocet, oral steroid    Injections: no injections lumbar spine   2/26/2021 - C7-T1 ILESI     12/22/2020 - C7-T1 ILESI     Physical Therapy: no recent physical therapy  Chiropractic Medicine: has attempted  Accupunture/Massage Therapy: has not attempted   These therapeutic modalities were ineffective at providing sustained pain relief/functional improvement.     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  Continues with back pain and back spasms.  Notes that she did require ER evaluation to due to exacerbation of pain and received Toradol injection.  Denies any lower extremity sciatica pain.  She is now using a walker.    Objective:     Family History   Problem Relation Age of Onset    Heart disease Mother         cardiac    Colon cancer Father 65    Heart disease Father     OCD Father     No Known Problems Sister     No  Known Problems Daughter     No Known Problems Maternal Grandmother     No Known Problems Maternal Grandfather     No Known Problems Paternal Grandmother     Prostate cancer Paternal Grandfather     No Known Problems Sister     No Known Problems Daughter     No Known Problems Maternal Aunt     No Known Problems Paternal Aunt     No Known Problems Paternal Aunt        Past Medical History:   Diagnosis Date    Anxiety 2000    Many years,ocd,meds started year 2000    Cataract, right     Closed fracture of right distal fibula 3/22/2022    Detached retina, right     Disease of thyroid gland     OCD (obsessive compulsive disorder)     Seasonal allergies     Wrist fracture        Current Outpatient Medications   Medication Sig Dispense Refill    celecoxib (CeleBREX) 200 mg capsule Take 1 capsule (200 mg total) by mouth 2 (two) times a day 60 capsule 3    Denosumab (PROLIA SC) Inject under the skin      diazepam (VALIUM) 5 mg tablet Take 1 tablet (5 mg total) by mouth every 8 (eight) hours as needed for muscle spasms for up to 10 days 15 tablet 0    Lidocaine 4 % PTCH Apply 1 patch topically every 12 (twelve) hours as needed (as needed for pain) 20 patch 0    oxyCODONE-acetaminophen (Percocet) 7.5-325 MG per tablet Take 1 tablet by mouth every 4 (four) hours as needed for moderate pain Max Daily Amount: 6 tablets 40 tablet 0    sertraline (ZOLOFT) 100 mg tablet TAKE ONE TABLET BY MOUTH EVERY DAY 90 tablet 1    Synthroid 150 MCG tablet Take 1 tablet (150 mcg total) by mouth daily 30 tablet 3    VITAMIN D, CHOLECALCIFEROL, PO Take by mouth      methocarbamol (ROBAXIN) 500 mg tablet Take 1 tablet (500 mg total) by mouth 3 (three) times a day as needed for muscle spasms (Patient not taking: Reported on 2/18/2025) 30 tablet 1    valACYclovir (VALTREX) 1,000 mg tablet Take 1 tablet (1,000 mg total) by mouth 2 (two) times a day as needed (cold sores) 60 tablet 1     No current facility-administered medications for this visit.  "      Past Surgical History:   Procedure Laterality Date    CATARACT EXTRACTION      EYE SURGERY      FOOT SURGERY      MANDIBLE FRACTURE SURGERY      TONSILLECTOMY         Social History     Socioeconomic History    Marital status: /Civil Union     Spouse name: Not on file    Number of children: Not on file    Years of education: Not on file    Highest education level: Not on file   Occupational History    Not on file   Tobacco Use    Smoking status: Never    Smokeless tobacco: Never   Vaping Use    Vaping status: Never Used   Substance and Sexual Activity    Alcohol use: Yes     Alcohol/week: 2.0 standard drinks of alcohol     Types: 2 Cans of beer per week     Comment: social drinking, not often    Drug use: Never    Sexual activity: Never   Other Topics Concern    Not on file   Social History Narrative    Daily coffee    Denied cola    Denied tea     Social Drivers of Health     Financial Resource Strain: Not on file   Food Insecurity: Not on file   Transportation Needs: Not on file   Physical Activity: Not on file   Stress: Not on file   Social Connections: Not on file   Intimate Partner Violence: Not on file   Housing Stability: Not on file       No Known Allergies    Review of Systems  General- denies fever/chills  HEENT- denies hearing loss or sore throat  Eyes- denies eye pain or visual disturbances, denies red eyes  Respiratory- denies cough or SOB  Cardio- denies chest pain or palpitations  GI- denies abdominal pain  Endocrine- denies urinary frequency  Urinary- denies pain with urination  Musculoskeletal- Negative except noted above  Skin- denies rashes or wounds  Neurological- denies dizziness or headache  Psychiatric- denies anxiety or difficulty concentrating    Physical Exam  Ht 5' 3\" (1.6 m)   Wt 64.9 kg (143 lb 1.3 oz)   BMI 25.35 kg/m²     General/Constitutional: No apparent distress: well-nourished and well developed.  Lymphatic: No appreciable lymphadenopathy  Respiratory: Non-labored " "breathing  Vascular: No edema, swelling or tenderness, except as noted in detailed exam.  Integumentary: No impressive skin lesions present, except as noted in detailed exam.  Psych: Normal mood and affect, oriented to person, place and time.  MSK: normal other than stated in HPI and exam  Gait & balance: no evidence of myelopathic gait, ambulates Independently     Lumbar spine range of motion:  -Forward flexion to 30  -Extension to neutral  There is mild tenderness with palpation along lumbar paraspinal musculature, no midline tenderness     Neurologic:  Lower Extremity Motor Function    Right  Left    Iliopsoas  5/5  5/5    Quadriceps 5/5 5/5   Tibialis anterior  5/5  5/5    EHL  5/5  5/5    Gastroc. muscle  5/5  5/5    Heel rise  5/5  5/5    Toe rise  5/5  5/5      Sensory: light touch is intact to bilateral lower extremities     Reflexes:  Intact, brisk    Other tests:  Straight Leg Raise: negative  Sierra SI: negative  ADA SI: negative  Greater troch: no tenderness   Internal/external hip ROM: intact, no pain   Flexion/extension knee ROM: intact, no pain   Vascular: WWP extremities    Diagnostic Tests   IMAGING: I have personally reviewed the images and these are my findings:  Lumbar Spine X-rays from 2/14/2025 and 2/18/2025: multi level lumbar spondylosis with loss of disc height, osteophyte formation and facet hypertrophy, L5-S1 spondylolisthesis with probable pars defect, no appreciated lytic/blastic lesions, no obvious instability    Lumbar Spine MRI 2/26/25: L5-S1 spondylolisthesis with bilateral foraminal stenosis     Electronic Medical Records were reviewed including office notes, imaging studies, radiology reports    Procedures, if performed today     None performed       Portions of the record may have been created with voice recognition software.  Occasional wrong word or \"sound a like\" substitutions may have occurred due to the inherent limitations of voice recognition software.  Read the chart " carefully and recognize, using context, where substitutions have occurred.

## 2025-03-10 ENCOUNTER — TELEPHONE (OUTPATIENT)
Dept: PAIN MEDICINE | Facility: CLINIC | Age: 65
End: 2025-03-10

## 2025-03-10 ENCOUNTER — TELEPHONE (OUTPATIENT)
Dept: FAMILY MEDICINE CLINIC | Facility: CLINIC | Age: 65
End: 2025-03-10

## 2025-03-10 ENCOUNTER — CONSULT (OUTPATIENT)
Dept: PAIN MEDICINE | Facility: CLINIC | Age: 65
End: 2025-03-10
Payer: COMMERCIAL

## 2025-03-10 VITALS
BODY MASS INDEX: 25.16 KG/M2 | RESPIRATION RATE: 14 BRPM | HEART RATE: 72 BPM | HEIGHT: 63 IN | WEIGHT: 142 LBS | SYSTOLIC BLOOD PRESSURE: 150 MMHG | DIASTOLIC BLOOD PRESSURE: 82 MMHG

## 2025-03-10 DIAGNOSIS — M54.16 LUMBAR RADICULOPATHY: Primary | ICD-10-CM

## 2025-03-10 DIAGNOSIS — S22.080A COMPRESSION FRACTURE OF T12 VERTEBRA, INITIAL ENCOUNTER (HCC): ICD-10-CM

## 2025-03-10 DIAGNOSIS — M47.816 LUMBAR SPONDYLOSIS: ICD-10-CM

## 2025-03-10 DIAGNOSIS — M54.50 LUMBAR BACK PAIN: ICD-10-CM

## 2025-03-10 DIAGNOSIS — S22.000A THORACIC COMPRESSION FRACTURE (HCC): ICD-10-CM

## 2025-03-10 DIAGNOSIS — S22.080A COMPRESSION FRACTURE OF T12 VERTEBRA, INITIAL ENCOUNTER (HCC): Primary | ICD-10-CM

## 2025-03-10 DIAGNOSIS — M43.16 SPONDYLOLISTHESIS OF LUMBAR REGION: ICD-10-CM

## 2025-03-10 PROCEDURE — 99244 OFF/OP CNSLTJ NEW/EST MOD 40: CPT | Performed by: ANESTHESIOLOGY

## 2025-03-10 NOTE — TELEPHONE ENCOUNTER
[facility]     Pt's last OV was on 2/24/2025    Forms given to clinical

## 2025-03-10 NOTE — TELEPHONE ENCOUNTER
Please let patient know I was reviewing her MRI lumbar spine in more detail and I think some of the spasms she is getting up the sides of her back are actually related to the mild T12 compression fracture.  I would like her to start using a TLSO brace while she is upright to help reduce mobility of the back to aid in healing.  Please contact [facility] to get her fitted.

## 2025-03-10 NOTE — PROGRESS NOTES
Assessment  1. Lumbar radiculopathy    2. Lumbar spondylosis    3. Lumbar back pain    4. Spondylolisthesis of lumbar region    5. Compression fracture of T12 vertebra, initial encounter ([facility])        Plan  The patient is experiencing bilateral pain at the L5 level related to her L5-S1 foraminal stenosis and spondylolisthesis.  At this time, I discussed proceeding with bilateral L5 transforaminal epidural injection to help reduce swelling/inflammation.  She would like to proceed and will be scheduled under fluoroscopic guidance.    In addition, she has an acute T12 compression fracture that I suspect is related to injury she had to her foot.  I will prescribe a TLSO brace to limit mobility of the spine when she is upright to allow healing.    My impressions and treatment recommendations were discussed in detail with the patient who verbalized understanding and had no further questions.  Discharge instructions were provided. I personally saw and examined the patient and I agree with the above discussed plan of care.    Orders Placed This Encounter   Procedures    FL spine and pain procedure     Standing Status:   Future     Expected Date:   3/10/2025     Expiration Date:   3/10/2029     Reason for Exam::   Bilateral L5 TF CARL     Anticoagulant hold needed?:   No     No orders of the defined types were placed in this encounter.      History of Present Illness    Lisa Canseco is a 64 y.o. female referred by Dr. Alvarenga for lower back pain.  The patient broke her left foot on 1/19/2024 and then afterwards started having pain in the lower back with significant spasms with difficulty with bending.  Symptoms have been severe rated 10/10 on a numeric rating scale describes as a constant sharp cutting pain across the lower back with spasms into her sides.  She has been using a walker for ambulation.  She is currently on a combination of methocarbamol, celecoxib with nighttime Percocet which is helping.  She saw   Lyle who referred her here for further treatment.    I have personally reviewed and/or updated the patient's past medical history, past surgical history, family history, social history, current medications, allergies, and vital signs today.     Review of Systems   Constitutional:  Negative for fever and unexpected weight change.   HENT:  Negative for trouble swallowing.    Eyes:  Negative for visual disturbance.   Respiratory:  Negative for shortness of breath and wheezing.    Cardiovascular:  Negative for chest pain and palpitations.   Gastrointestinal:  Negative for constipation, diarrhea, nausea and vomiting.   Endocrine: Negative for cold intolerance, heat intolerance and polydipsia.   Genitourinary:  Negative for difficulty urinating and frequency.   Musculoskeletal:  Positive for back pain. Negative for arthralgias, gait problem, joint swelling and myalgias.   Skin:  Negative for rash.   Neurological:  Negative for dizziness, seizures, syncope, weakness and headaches.   Hematological:  Does not bruise/bleed easily.   Psychiatric/Behavioral:  Negative for dysphoric mood.    All other systems reviewed and are negative.      Patient Active Problem List   Diagnosis    Anxiety    Hypothyroidism    Osteoporosis    Cervical disc disorder with radiculopathy of mid-cervical region    Closed fracture of left foot, initial encounter       Past Medical History:   Diagnosis Date    Anxiety 2000    Many years,ocd,meds started year 2000    Cataract, right     Closed fracture of right distal fibula 3/22/2022    Detached retina, right     Disease of thyroid gland     OCD (obsessive compulsive disorder)     Seasonal allergies     Wrist fracture        Past Surgical History:   Procedure Laterality Date    CATARACT EXTRACTION      EYE SURGERY      FOOT SURGERY      MANDIBLE FRACTURE SURGERY      TONSILLECTOMY         Family History   Problem Relation Age of Onset    Heart disease Mother         cardiac    Colon cancer Father  65    Heart disease Father     OCD Father     No Known Problems Sister     No Known Problems Daughter     No Known Problems Maternal Grandmother     No Known Problems Maternal Grandfather     No Known Problems Paternal Grandmother     Prostate cancer Paternal Grandfather     No Known Problems Sister     No Known Problems Daughter     No Known Problems Maternal Aunt     No Known Problems Paternal Aunt     No Known Problems Paternal Aunt        Social History     Occupational History    Not on file   Tobacco Use    Smoking status: Never    Smokeless tobacco: Never   Vaping Use    Vaping status: Never Used   Substance and Sexual Activity    Alcohol use: Yes     Alcohol/week: 2.0 standard drinks of alcohol     Types: 2 Cans of beer per week     Comment: social drinking, not often    Drug use: Never    Sexual activity: Never       Current Outpatient Medications on File Prior to Visit   Medication Sig    celecoxib (CeleBREX) 200 mg capsule Take 1 capsule (200 mg total) by mouth 2 (two) times a day    Denosumab (PROLIA SC) Inject under the skin    Lidocaine 4 % PTCH Apply 1 patch topically every 12 (twelve) hours as needed (as needed for pain)    methocarbamol (ROBAXIN) 500 mg tablet Take 1 tablet (500 mg total) by mouth 3 (three) times a day as needed for muscle spasms    oxyCODONE-acetaminophen (Percocet) 7.5-325 MG per tablet Take 1 tablet by mouth every 4 (four) hours as needed for moderate pain Max Daily Amount: 6 tablets    sertraline (ZOLOFT) 100 mg tablet TAKE ONE TABLET BY MOUTH EVERY DAY    Synthroid 150 MCG tablet Take 1 tablet (150 mcg total) by mouth daily    VITAMIN D, CHOLECALCIFEROL, PO Take by mouth    diazepam (VALIUM) 5 mg tablet Take 1 tablet (5 mg total) by mouth every 8 (eight) hours as needed for muscle spasms for up to 10 days    valACYclovir (VALTREX) 1,000 mg tablet Take 1 tablet (1,000 mg total) by mouth 2 (two) times a day as needed (cold sores)    [DISCONTINUED] gabapentin (Neurontin) 300 mg  "capsule Take 1 capsule (300 mg total) by mouth daily at bedtime (Patient not taking: Reported on 3/10/2025)     No current facility-administered medications on file prior to visit.       No Known Allergies    Physical Exam    /82 (BP Location: Right arm, Patient Position: Sitting, Cuff Size: Standard)   Pulse 72   Resp 14   Ht 5' 3\" (1.6 m)   Wt 64.4 kg (142 lb)   BMI 25.15 kg/m²     Constitutional: normal, well developed, well nourished, alert, in no distress and non-toxic and no overt pain behavior.  Eyes: anicteric  HEENT: grossly intact  Neck: supple, symmetric, trachea midline and no masses   Pulmonary:even and unlabored  Cardiovascular:No edema or pitting edema present  Skin:Normal without rashes or lesions and well hydrated  Psychiatric:Mood and affect appropriate  Neurologic:Cranial Nerves II-XII grossly intact  Musculoskeletal:normal    Lumbar Spine Exam  Appearance:  Normal lordosis  Palpation/Tenderness:  left lumbar paraspinal tenderness  right lumbar paraspinal tenderness  Range of Motion:  Flexion:  Moderately limited  with pain  Extension:  Moderately limited  with pain  Motor Strength:  Left hip flexion:  5/5  Left hip extension:  5/5  Right hip flexion:  5/5  Right hip extension:  5/5  Left knee flexion:  5/5  Left knee extension:  5/5  Right knee flexion:  5/5  Right knee extension:  5/5  Left foot dorsiflexion:  5/5  Left foot plantar flexion:  5/5  Right foot dorsiflexion:  5/5  Right foot plantar flexion:  5/5  Reflexes:  Left Patellar:  3+   Right Patellar:  2+   Left Achilles:  3+   Right Achilles:  2+     Imaging    MRI LUMBAR SPINE WITHOUT CONTRAST (2/26/2025)     INDICATION: M54.50: Low back pain, unspecified  M43.17: Spondylolisthesis, lumbosacral region.     COMPARISON: X-rays from 2/18/2025     TECHNIQUE:  Multiplanar, multisequence imaging of the lumbar spine was performed. .        IMAGE QUALITY:  Diagnostic     FINDINGS:     VERTEBRAL BODIES:  There are 5 lumbar type " vertebral bodies. Bilateral pars defects at L5 with 9 mm anterolisthesis of L5 on S1. Mild type I Modic endplate changes at L5-S1. Mild acute appearing compression deformity T12 superior endplate with minimal   height loss and trace retropulsion along the posterior superior endplate. No acute compression fracture in the lumbar spine.     SACRUM:  Normal signal within the sacrum. No evidence of insufficiency or stress fracture.     DISTAL CORD AND CONUS:  Normal size and signal within the distal cord and conus.     PARASPINAL SOFT TISSUES:  Paraspinal soft tissues are unremarkable.     LOWER THORACIC DISC SPACES: Mild noncompressive lower thoracic degenerative change.     LUMBAR DISC SPACES:     L1-L2:  Normal.     L2-L3:  Normal.     L3-L4:  Normal.     L4-L5: Mild facet arthrosis. Disc bulge with shallow midline extrusion and annular fissure. Minimal canal narrowing and bilateral subarticular narrowing. No significant foraminal narrowing.     L5-S1: Anterolisthesis with unroofing of the disc, broad disc bulge and marginal osteophytes. Mild bilateral facet arthrosis. Moderate to severe bilateral foraminal stenosis. No canal stenosis.     OTHER FINDINGS:  None.

## 2025-03-12 ENCOUNTER — TELEPHONE (OUTPATIENT)
Dept: PAIN MEDICINE | Facility: CLINIC | Age: 65
End: 2025-03-12

## 2025-03-12 ENCOUNTER — HOSPITAL ENCOUNTER (OUTPATIENT)
Dept: RADIOLOGY | Facility: CLINIC | Age: 65
Discharge: HOME | End: 2025-03-12
Admitting: ANESTHESIOLOGY
Payer: COMMERCIAL

## 2025-03-12 VITALS
TEMPERATURE: 98.7 F | OXYGEN SATURATION: 96 % | SYSTOLIC BLOOD PRESSURE: 144 MMHG | HEART RATE: 80 BPM | DIASTOLIC BLOOD PRESSURE: 83 MMHG | RESPIRATION RATE: 16 BRPM

## 2025-03-12 DIAGNOSIS — M54.16 LUMBAR RADICULOPATHY: ICD-10-CM

## 2025-03-12 PROCEDURE — 64483 NJX AA&/STRD TFRM EPI L/S 1: CPT | Performed by: ANESTHESIOLOGY

## 2025-03-12 RX ORDER — 0.9 % SODIUM CHLORIDE 0.9 %
4 VIAL (ML) INJECTION ONCE
Status: COMPLETED | OUTPATIENT
Start: 2025-03-12 | End: 2025-03-12

## 2025-03-12 RX ORDER — METHYLPREDNISOLONE ACETATE 80 MG/ML
80 INJECTION, SUSPENSION INTRA-ARTICULAR; INTRALESIONAL; INTRAMUSCULAR; PARENTERAL; SOFT TISSUE ONCE
Status: COMPLETED | OUTPATIENT
Start: 2025-03-12 | End: 2025-03-12

## 2025-03-12 RX ORDER — BUPIVACAINE HCL/PF 2.5 MG/ML
2 VIAL (ML) INJECTION ONCE
Status: COMPLETED | OUTPATIENT
Start: 2025-03-12 | End: 2025-03-12

## 2025-03-12 RX ADMIN — Medication 4 ML: at 09:10

## 2025-03-12 RX ADMIN — METHYLPREDNISOLONE ACETATE 80 MG: 80 INJECTION, SUSPENSION INTRA-ARTICULAR; INTRALESIONAL; INTRAMUSCULAR; SOFT TISSUE at 09:11

## 2025-03-12 RX ADMIN — LIDOCAINE HYDROCHLORIDE 4 ML: 20 INJECTION, SOLUTION EPIDURAL; INFILTRATION; INTRACAUDAL at 09:10

## 2025-03-12 RX ADMIN — BUPIVACAINE HYDROCHLORIDE 2 ML: 2.5 INJECTION, SOLUTION EPIDURAL; INFILTRATION; INTRACAUDAL at 09:11

## 2025-03-12 RX ADMIN — IOHEXOL 1 ML: 300 INJECTION, SOLUTION INTRAVENOUS at 09:11

## 2025-03-12 NOTE — H&P
History of Present Illness: The patient is a 64 y.o. female who presents with complaints of lower back pain is here today for bilateral L5 transforaminal epidural steroid injection    Past Medical History:   Diagnosis Date    Anxiety 2000    Many years,ocd,meds started year 2000    Cataract, right     Closed fracture of right distal fibula 3/22/2022    Detached retina, right     Disease of thyroid gland     OCD (obsessive compulsive disorder)     Seasonal allergies     Wrist fracture        Past Surgical History:   Procedure Laterality Date    CATARACT EXTRACTION      EYE SURGERY      FOOT SURGERY      MANDIBLE FRACTURE SURGERY      TONSILLECTOMY           Current Outpatient Medications:     celecoxib (CeleBREX) 200 mg capsule, Take 1 capsule (200 mg total) by mouth 2 (two) times a day, Disp: 60 capsule, Rfl: 3    Denosumab (PROLIA SC), Inject under the skin, Disp: , Rfl:     diazepam (VALIUM) 5 mg tablet, Take 1 tablet (5 mg total) by mouth every 8 (eight) hours as needed for muscle spasms for up to 10 days, Disp: 15 tablet, Rfl: 0    Lidocaine 4 % PTCH, Apply 1 patch topically every 12 (twelve) hours as needed (as needed for pain), Disp: 20 patch, Rfl: 0    methocarbamol (ROBAXIN) 500 mg tablet, Take 1 tablet (500 mg total) by mouth 3 (three) times a day as needed for muscle spasms, Disp: 30 tablet, Rfl: 1    oxyCODONE-acetaminophen (Percocet) 7.5-325 MG per tablet, Take 1 tablet by mouth every 4 (four) hours as needed for moderate pain Max Daily Amount: 6 tablets, Disp: 40 tablet, Rfl: 0    sertraline (ZOLOFT) 100 mg tablet, TAKE ONE TABLET BY MOUTH EVERY DAY, Disp: 90 tablet, Rfl: 1    Synthroid 150 MCG tablet, Take 1 tablet (150 mcg total) by mouth daily, Disp: 30 tablet, Rfl: 3    valACYclovir (VALTREX) 1,000 mg tablet, Take 1 tablet (1,000 mg total) by mouth 2 (two) times a day as needed (cold sores), Disp: 60 tablet, Rfl: 1    VITAMIN D, CHOLECALCIFEROL, PO, Take by mouth, Disp: , Rfl:     Current  Facility-Administered Medications:     bupivacaine (PF) (MARCAINE) 0.25 % injection 2 mL, 2 mL, Epidural, Once, Masoud Ernandez MD    iohexol (OMNIPAQUE) 300 mg/mL injection 1 mL, 1 mL, Epidural, Once, Masoud Ernandez MD    lidocaine (PF) (XYLOCAINE-MPF) 2 % injection 4 mL, 4 mL, Infiltration, Once, Masoud Ernandez MD    methylPREDNISolone acetate (DEPO-MEDROL) injection 80 mg, 80 mg, Epidural, Once, Masoud Ernandez MD    sodium chloride (PF) 0.9 % injection 4 mL, 4 mL, Infiltration, Once, Masoud Ernandez MD    No Known Allergies    Physical Exam:   Vitals:    03/12/25 0856   BP: 136/83   Pulse: 91   Resp: 16   Temp: 98.7 °F (37.1 °C)   SpO2: 98%     General: Awake, Alert, Oriented x 3, Mood and affect appropriate  Respiratory: Respirations even and unlabored  Cardiovascular: Peripheral pulses intact; no edema  Musculoskeletal Exam: Lower back and leg pain    ASA Score: 3    Patient/Chart Verification  Patient ID Verified: Verbal  Consents Confirmed: To be obtained in the Procedural area  H&P( within 30 days) Verified: To be obtained in the Procedural area  Interval H&P(within 24 hr) Complete (required for Outpatients and Surgery Admit only): To be obtained in the Procedural area  Allergies Reviewed: Yes  Anticoag/NSAID held?: NA  Currently on antibiotics?: No  Pregnancy denied?: NA    Assessment:   1. Lumbar radiculopathy        Plan: Bilateral L5 TF CARL

## 2025-03-12 NOTE — DISCHARGE INSTR - LAB
Epidural Steroid Injection   WHAT YOU NEED TO KNOW:   An epidural steroid injection (CARL) is a procedure to inject steroid medicine into the epidural space. The epidural space is between your spinal cord and vertebrae. Steroids reduce inflammation and fluid buildup in your spine that may be causing pain. You may be given pain medicine along with the steroids.          ACTIVITY  Do not drive or operate machinery today.  No strenuous activity today - bending, lifting, etc.  You may resume normal activites starting tomorrow - start slowly and as tolerated.  You may shower today, but no tub baths or hot tubs.  You may have numbness for several hours from the local anesthetic. Please use caution and common sense, especially with weight-bearing activities.    CARE OF THE INJECTION SITE  If you have soreness or pain, apply ice to the area today (20 minutes on/20 minutes off).  Starting tomorrow, you may use warm, moist heat or ice if needed.  You may have an increase or change in your discomfort for 36-48 hours after your treatment.  Apply ice and continue with any pain medication you have been prescribed.  Notify the [facility] if you have any of the following: redness, drainage, swelling, headache, stiff neck or fever above 100°F.    SPECIAL INSTRUCTIONS  Our office will contact you in approximately 14 days for a progress report.    MEDICATIONS  Continue to take all routine medications.  Our office may have instructed you to hold some medications.    As no general anesthesia was used in today's procedure, you should not experience any side effects related to anesthesia.     If you are diabetic, the steroids used in today's injection may temporarily increase your blood sugar levels after the first few days after your injection. Please keep a close eye on your sugars and alert the doctor who manages your diabetes if your sugars are significantly high from your baseline or you are symptomatic.     If you have a  problem specifically related to your procedure, please call our office at [phone].  Problems not related to your procedure should be directed to your primary care physician.

## 2025-03-12 NOTE — TELEPHONE ENCOUNTER
Pt here for procedure today and dropped off short term disability paperwork. FQ aware that PPW scanned into [employer] for leave team review.

## 2025-03-13 ENCOUNTER — PATIENT MESSAGE (OUTPATIENT)
Dept: FAMILY MEDICINE CLINIC | Facility: CLINIC | Age: 65
End: 2025-03-13

## 2025-03-14 ENCOUNTER — APPOINTMENT (OUTPATIENT)
Dept: RADIOLOGY | Facility: MEDICAL CENTER | Age: 65
End: 2025-03-14
Payer: COMMERCIAL

## 2025-03-14 DIAGNOSIS — S22.080D COMPRESSION FRACTURE OF T12 VERTEBRA WITH ROUTINE HEALING, SUBSEQUENT ENCOUNTER: Primary | ICD-10-CM

## 2025-03-14 DIAGNOSIS — S22.080D COMPRESSION FRACTURE OF T12 VERTEBRA WITH ROUTINE HEALING, SUBSEQUENT ENCOUNTER: ICD-10-CM

## 2025-03-14 PROCEDURE — 72072 X-RAY EXAM THORAC SPINE 3VWS: CPT

## 2025-03-17 ENCOUNTER — RESULTS FOLLOW-UP (OUTPATIENT)
Dept: FAMILY MEDICINE CLINIC | Facility: CLINIC | Age: 65
End: 2025-03-17

## 2025-03-17 NOTE — TELEPHONE ENCOUNTER
Thoracic spine xrays ordered by Dr. Emerson are final from 3/14/25. Pt asking FQ to review and advise next step.

## 2025-03-17 NOTE — TELEPHONE ENCOUNTER
Caller: wilmer Gonzalez     Doctor: Dr. Ernandez     Reason for call: pt stated that the XRAY results are in. Pt would like for Dr. Ernandez to review them and let her know what the next steps are because she is in a lot of pain.     Call back#: [phone]

## 2025-03-18 ENCOUNTER — PATIENT MESSAGE (OUTPATIENT)
Dept: PAIN MEDICINE | Facility: CLINIC | Age: 65
End: 2025-03-18

## 2025-03-18 DIAGNOSIS — M54.50 LUMBAR BACK PAIN: ICD-10-CM

## 2025-03-18 RX ORDER — OXYCODONE AND ACETAMINOPHEN 7.5; 325 MG/1; MG/1
1 TABLET ORAL 3 TIMES DAILY PRN
Qty: 90 TABLET | Refills: 0 | Status: SHIPPED | OUTPATIENT
Start: 2025-03-18 | End: 2025-03-18 | Stop reason: SDUPTHER

## 2025-03-18 RX ORDER — OXYCODONE AND ACETAMINOPHEN 7.5; 325 MG/1; MG/1
1 TABLET ORAL 3 TIMES DAILY PRN
Qty: 90 TABLET | Refills: 0 | Status: SHIPPED | OUTPATIENT
Start: 2025-03-18

## 2025-03-18 NOTE — TELEPHONE ENCOUNTER
S/W pt and advised the same.  Provided central scheduling phone number [phone] and Dr Barba phone number [phone]    Pt verbalized understanding and appreciative.

## 2025-03-18 NOTE — TELEPHONE ENCOUNTER
Compression fracture has worsened and she has another higher up so I am ordering MRI thoracic spine and placing order for her to see Dr. Barba.    Make sure she stopped the physical therapy and that she is still using her brace    There is also an order for her to get a repeat DEXA scan that was ordered by Dr. Alvarenga's office so she should get that scheduled as well

## 2025-03-19 ENCOUNTER — TELEPHONE (OUTPATIENT)
Dept: PODIATRY | Facility: CLINIC | Age: 65
End: 2025-03-19

## 2025-03-19 DIAGNOSIS — S82.839A AVULSION FRACTURE OF DISTAL END OF FIBULA: Primary | ICD-10-CM

## 2025-03-19 DIAGNOSIS — S92.902A CLOSED FRACTURE OF LEFT FOOT, INITIAL ENCOUNTER: Primary | ICD-10-CM

## 2025-03-19 NOTE — TELEPHONE ENCOUNTER
Caller: Lisa Canseco    Doctor and/or Office: Dr. Tulio Davis    #: [phone]    Escalation: Lisa is requesting that Dr. Davis put an order in for x-rays for her as Dr. Davis wanted one more x-ray to check on her healing.  She is doing great and does not have any pain.  She does not need another appointment unless Dr. Davis sees something on the x-ray.

## 2025-03-20 DIAGNOSIS — S22.069A CLOSED FRACTURE OF EIGHTH THORACIC VERTEBRA, UNSPECIFIED FRACTURE MORPHOLOGY, INITIAL ENCOUNTER (HCC): ICD-10-CM

## 2025-03-20 DIAGNOSIS — S22.079A CLOSED FRACTURE OF TENTH THORACIC VERTEBRA, UNSPECIFIED FRACTURE MORPHOLOGY, INITIAL ENCOUNTER (HCC): ICD-10-CM

## 2025-03-20 DIAGNOSIS — S22.080A COMPRESSION FRACTURE OF T12 VERTEBRA, INITIAL ENCOUNTER (HCC): Primary | ICD-10-CM

## 2025-03-20 DIAGNOSIS — M81.8 OTHER OSTEOPOROSIS WITHOUT CURRENT PATHOLOGICAL FRACTURE: ICD-10-CM

## 2025-03-20 DIAGNOSIS — S22.079A CLOSED FRACTURE OF NINTH THORACIC VERTEBRA, UNSPECIFIED FRACTURE MORPHOLOGY, INITIAL ENCOUNTER (HCC): ICD-10-CM

## 2025-03-24 ENCOUNTER — HOSPITAL ENCOUNTER (OUTPATIENT)
Dept: RADIOLOGY | Facility: HOSPITAL | Age: 65
Discharge: HOME | End: 2025-03-24
Payer: COMMERCIAL

## 2025-03-24 DIAGNOSIS — S22.000A THORACIC COMPRESSION FRACTURE (HCC): ICD-10-CM

## 2025-03-24 PROCEDURE — 72146 MRI CHEST SPINE W/O DYE: CPT

## 2025-03-26 ENCOUNTER — TELEPHONE (OUTPATIENT)
Dept: RADIOLOGY | Facility: MEDICAL CENTER | Age: 65
End: 2025-03-26

## 2025-03-26 NOTE — TELEPHONE ENCOUNTER
Patient Reports       %     improvement post injection    Pain Level   0 /10   Patient is canceling appointment for Thursday. She is seeing a neurosurgeon

## 2025-03-28 ENCOUNTER — CLINICAL SUPPORT (OUTPATIENT)
Dept: FAMILY MEDICINE CLINIC | Facility: CLINIC | Age: 65
End: 2025-03-28

## 2025-03-28 ENCOUNTER — OFFICE VISIT (OUTPATIENT)
Dept: NEUROSURGERY | Facility: CLINIC | Age: 65
End: 2025-03-28
Payer: COMMERCIAL

## 2025-03-28 VITALS
WEIGHT: 142 LBS | DIASTOLIC BLOOD PRESSURE: 88 MMHG | HEART RATE: 91 BPM | HEIGHT: 63 IN | SYSTOLIC BLOOD PRESSURE: 142 MMHG | RESPIRATION RATE: 15 BRPM | TEMPERATURE: 98.3 F | BODY MASS INDEX: 25.16 KG/M2 | OXYGEN SATURATION: 97 %

## 2025-03-28 DIAGNOSIS — M81.0 OSTEOPOROSIS, UNSPECIFIED OSTEOPOROSIS TYPE, UNSPECIFIED PATHOLOGICAL FRACTURE PRESENCE: Primary | ICD-10-CM

## 2025-03-28 DIAGNOSIS — S22.000A THORACIC COMPRESSION FRACTURE (HCC): ICD-10-CM

## 2025-03-28 DIAGNOSIS — M80.08XA AGE-RELATED OSTEOPOROSIS WITH CURRENT PATHOLOGICAL FRACTURE OF VERTEBRA (HCC): Primary | ICD-10-CM

## 2025-03-28 PROCEDURE — 99245 OFF/OP CONSLTJ NEW/EST HI 55: CPT | Performed by: RADIOLOGY

## 2025-03-28 RX ORDER — SODIUM CHLORIDE 9 MG/ML
75 INJECTION, SOLUTION INTRAVENOUS CONTINUOUS
OUTPATIENT
Start: 2025-03-28

## 2025-03-28 RX ORDER — CEFAZOLIN SODIUM 2 G/50ML
2000 SOLUTION INTRAVENOUS ONCE
OUTPATIENT
Start: 2025-03-28 | End: 2025-03-28

## 2025-03-28 NOTE — PROGRESS NOTES
Name: Lisa Canseco      : 1960      MRN: [id number]  Encounter Provider: Sean Barba MD  Encounter Date: 3/28/2025   Encounter department: [facility] [place]  :  Assessment & Plan  Age-related osteoporosis with current pathological fracture of vertebra (HCC)  Lisa has numerous compression fractures.  While edema is not severe she has exhausted conservative therapy and remains in severe pain. I have offered kyphoplasty of T9, T10 and L1.  She understands the risks and has elected proceed.  She requires medical clearance.    Regarding her osteoporosis, her fractures have occurred in the setting of Prolia therapy. I am not sure if this is considered failure or not but I would like her to be evaluated by Endocrinology.   Orders:  •  Ambulatory Referral to Endocrinology; Future  •  IR KYPHOPLASTY/VERTEBROPLASTY; Future    Thoracic compression fracture (HCC)  As above  Orders:  •  Ambulatory referral to Neurosurgery        History of Present Illness     Lisa Canseco is a 64 y.o. female who presents for evaluation of multiple compression fractures. She reports on 25 stubbing her foot on the stairs and fracturing several bones.  Then 1 month later reports developing sudden lower back pain.  MRI of the lumbar spine was performed which revealed a T12 compression fracture.  She received an epidural steroid injection at L5-S1 with good relief of her lower back pain however continued to have mid back pain with associated spasms.  Symptoms recently improved but has persistent severe mid back pain. Pain is 10/10.  No radiating pain or numbness into the legs.  No associated weakness.  She has been on Prolia for 4 years and without any gaps between injections lasting more than 3 months. Last dose in October.       HPI     Review of Systems   Gastrointestinal: Negative.    Genitourinary: Negative.    Musculoskeletal:  Positive for back pain (mid to lbp does not radiate), gait problem  (ambulates w/ walker --last fall about 2wks weeks ago, lost balance) and myalgias (b/l sides).   Neurological:  Negative for seizures, weakness and numbness.   Hematological:  Does not bruise/bleed easily.   Psychiatric/Behavioral:  Negative for sleep disturbance.      I have personally reviewed the MA's review of systems and made changes as necessary.    Past Medical History   Past Medical History:   Diagnosis Date   • Anxiety 2000    Many years,ocd,meds started year 2000   • Cataract, right    • Closed fracture of right distal fibula 3/22/2022   • Detached retina, right    • Disease of thyroid gland    • OCD (obsessive compulsive disorder)    • Seasonal allergies    • Wrist fracture      Past Surgical History:   Procedure Laterality Date   • CATARACT EXTRACTION     • EYE SURGERY     • FOOT SURGERY     • MANDIBLE FRACTURE SURGERY     • TONSILLECTOMY       Family History   Problem Relation Age of Onset   • Heart disease Mother         cardiac   • Colon cancer Father 65   • Heart disease Father    • OCD Father    • No Known Problems Sister    • No Known Problems Daughter    • No Known Problems Maternal Grandmother    • No Known Problems Maternal Grandfather    • No Known Problems Paternal Grandmother    • Prostate cancer Paternal Grandfather    • No Known Problems Sister    • No Known Problems Daughter    • No Known Problems Maternal Aunt    • No Known Problems Paternal Aunt    • No Known Problems Paternal Aunt      she reports that she has never smoked. She has never used smokeless tobacco. She reports current alcohol use of about 2.0 standard drinks of alcohol per week. She reports that she does not use drugs.  Current Outpatient Medications   Medication Instructions   • celecoxib (CELEBREX) 200 mg, Oral, 2 times daily   • Denosumab (PROLIA SC) Inject under the skin   • diazepam (VALIUM) 5 mg, Oral, Every 8 hours PRN   • Lidocaine 4 % PTCH 1 patch, Apply externally, Every 12 hours PRN   • methocarbamol (ROBAXIN)  "500 mg, Oral, 3 times daily PRN   • oxyCODONE-acetaminophen (Percocet) 7.5-325 MG per tablet 1 tablet, Oral, 3 times daily PRN   • sertraline (ZOLOFT) 100 mg tablet TAKE ONE TABLET BY MOUTH EVERY DAY   • Synthroid 150 mcg, Oral, Daily   • valACYclovir (VALTREX) 1,000 mg, Oral, 2 times daily PRN   • VITAMIN D, CHOLECALCIFEROL, PO Take by mouth   No Known Allergies   Objective   /88 (BP Location: Right arm, Patient Position: Sitting, Cuff Size: Standard)   Pulse 91   Temp 98.3 °F (36.8 °C) (Temporal)   Resp 15   Ht 5' 3\" (1.6 m)   Wt 64.4 kg (142 lb)   SpO2 97%   BMI 25.15 kg/m²     Physical Exam  Constitutional:       Appearance: Normal appearance.   HENT:      Head: Normocephalic and atraumatic.   Eyes:      Pupils: Pupils are equal, round, and reactive to light.   Pulmonary:      Effort: Pulmonary effort is normal.   Neurological:      General: No focal deficit present.      Mental Status: She is alert and oriented to person, place, and time.   Psychiatric:         Mood and Affect: Mood normal.         Behavior: Behavior normal.       Neurological Exam  Mental Status  Alert. Oriented to person, place, and time.    Cranial Nerves  CN III, IV, VI: Pupils equal round and reactive to light bilaterally.                "

## 2025-03-31 ENCOUNTER — CONSULT (OUTPATIENT)
Dept: ENDOCRINOLOGY | Facility: CLINIC | Age: 65
End: 2025-03-31
Payer: COMMERCIAL

## 2025-03-31 VITALS
TEMPERATURE: 97.8 F | WEIGHT: 138 LBS | OXYGEN SATURATION: 98 % | HEIGHT: 63 IN | SYSTOLIC BLOOD PRESSURE: 148 MMHG | HEART RATE: 84 BPM | DIASTOLIC BLOOD PRESSURE: 74 MMHG | BODY MASS INDEX: 24.45 KG/M2 | RESPIRATION RATE: 18 BRPM

## 2025-03-31 DIAGNOSIS — M80.08XA AGE-RELATED OSTEOPOROSIS WITH CURRENT PATHOLOGICAL FRACTURE OF VERTEBRA (HCC): Primary | ICD-10-CM

## 2025-03-31 DIAGNOSIS — E03.9 ACQUIRED HYPOTHYROIDISM: ICD-10-CM

## 2025-03-31 PROCEDURE — 99244 OFF/OP CNSLTJ NEW/EST MOD 40: CPT | Performed by: STUDENT IN AN ORGANIZED HEALTH CARE EDUCATION/TRAINING PROGRAM

## 2025-03-31 NOTE — PROGRESS NOTES
Name: Lisa Canseco      : 1960      MRN: [id number]  Encounter Provider: Angelic Thornton MD  Encounter Date: 3/31/2025   Encounter department: [facility] [employer]    Chief Complaint   Patient presents with   • Advice Only   :  Assessment & Plan  Age-related osteoporosis with current pathological fracture of vertebra (HCC)    Lisa has long history of severe osteoporosis since age 55, with recent several vertebral compression fractures.  The bone density scan conducted in 2023 revealed an increase in spinal bone density; however, the T-score remained significantly low at -3.5. The most recent MRI indicated multiple mild compression fractures from T6 to T10, as well as T12 and L1, with mild marrow edema in the T8, T9, T10, and L1 vertebral bodies, suggesting an acute to subacute component.  She tried Forteo in the past which could not tolerate, could not tolerate oral bisphosphonate due to tachycardia,  Currently on Prolia injections, for last 4 years, she missed the last dose, and there was a gap between her treatment for almost 5 months.  Given several fragility fractures especially in the spine and history of very low T-score, I believe that  a transition to anabolic agent including romosozumab is warranted. A comprehensive evaluation will be conducted to rule out secondary causes of osteoporosis. This will include checking vitamin D levels, parathyroid function, and bone turnover markers and SPEP. A new bone density scan will also be ordered. The patient will continue with vitamin D supplementation and ensure a daily intake of 1000 to 1200 mg of calcium, either through diet or supplements.     2. Hypothyroidism.  The patient has been on Synthroid for many years, with recent adjustments to the dosage. The current regimen is 150 mcg Monday to Friday, with no doses on weekends. A TSH test will be conducted to ensure that the patient is not receiving an excessive  amount of thyroid medication, which could contribute to osteoporosis.    Follow-up  The patient will follow up in 4 months.    Orders:  •  Ambulatory Referral to Endocrinology  •  Protein electrophoresis, serum  •  C-Telopeptide  •  NTX Panel, Urine  •  PTH, intact  •  Vitamin D 25 hydroxy  •  Celiac Disease Diagnostic Panel    Acquired hypothyroidism  To continue Synthroid at current dose and to check TSH to adjust the dose accordingly.           History of Present Illness   History of Present Illness    MEDICATIONS  Current: Prolia, Synthroid, Zoloft  Past: Forteo, Fosamax, prednisone  Lisa Canseco is a 64 y.o. female who presents for evaluation of osteoporosis at the request of Sean Barba MD.      She was referred by her neurosurgeon for an assessment of osteoporosis due to several fragility fractures. She has been on Prolia for the past 4 years, with her last dose administered on 05/24/2024. She missed her scheduled dose in November 2024 due to illness, resulting in a 4-5-month gap in treatment. During this period, she experienced several falls, including one that resulted in T6-T10 compression fraction as well as T12 and L1.  She does not take calcium supplements but consumes a diet rich in dairy products. She takes vitamin D gummies but has run out of them. She reports no history of weight loss surgery, malabsorption issues, rheumatoid arthritis, or kidney stones. She has not been informed of any abnormalities in her calcium levels. She has never smoked and only consumes alcohol occasionally. She has previously been on prednisone for possible subacute thyroiditis.  She went through menopause at age 54 and has not taken any hormone replacement therapy. She has not been tested for gluten allergy but reports no symptoms. She has a history of lactose intolerance but reports that it has improved. She was initially diagnosed with osteoporosis at age 55 and was prescribed Forteo, which she discontinued after 4  months due to nausea. Subsequent treatment with Fosamax was also discontinued after 2 doses due to tachycardia.    She has a 30-year history of hypothyroidism, managed with Synthroid. Her TSH and T4 levels have been fluctuating every 3 months, necessitating adjustments to her medication regimen. She is currently on Synthroid 150 mcg Monday through Friday, with no doses on weekends. She reports no difficulty swallowing or history of thyroid surgery.    SOCIAL HISTORY  She does not smoke. She drinks alcohol occasionally.    FAMILY HISTORY  Her mother had some osteoporosis but did not take any medication for it and had a few falls without major fractures. Her mother also had a heart issue.    ALLERGIES  She is lactose intolerant.    Pertinent Medical History            Review of Systems as per [facility]  Medical History Reviewed by provider this encounter:     .  Current Outpatient Medications on File Prior to Visit   Medication Sig Dispense Refill   • Denosumab (PROLIA SC) Inject under the skin     • methocarbamol (ROBAXIN) 500 mg tablet Take 1 tablet (500 mg total) by mouth 3 (three) times a day as needed for muscle spasms 30 tablet 1   • oxyCODONE-acetaminophen (Percocet) 7.5-325 MG per tablet Take 1 tablet by mouth 3 (three) times a day as needed for moderate pain Max Daily Amount: 3 tablets 90 tablet 0   • sertraline (ZOLOFT) 100 mg tablet TAKE ONE TABLET BY MOUTH EVERY DAY 90 tablet 1   • Synthroid 150 MCG tablet Take 1 tablet (150 mcg total) by mouth daily (Patient taking differently: Take 150 mcg by mouth daily Taking everyday excepts weekends) 30 tablet 3   • valACYclovir (VALTREX) 1,000 mg tablet Take 1 tablet (1,000 mg total) by mouth 2 (two) times a day as needed (cold sores) 60 tablet 1   • VITAMIN D, CHOLECALCIFEROL, PO Take by mouth     • celecoxib (CeleBREX) 200 mg capsule Take 1 capsule (200 mg total) by mouth 2 (two) times a day 60 capsule 3   • diazepam (VALIUM) 5 mg tablet Take 1 tablet (5 mg total) by  "mouth every 8 (eight) hours as needed for muscle spasms for up to 10 days 15 tablet 0   • Lidocaine 4 % PTCH Apply 1 patch topically every 12 (twelve) hours as needed (as needed for pain) 20 patch 0     No current facility-administered medications on file prior to visit.         Medical History Reviewed by provider this encounter:     .    Objective   /74 (BP Location: Left arm, Patient Position: Sitting, Cuff Size: Large)   Pulse 84   Temp 97.8 °F (36.6 °C) (Temporal)   Resp 18   Ht 5' 3\" (1.6 m)   Wt 62.6 kg (138 lb)   SpO2 98%   BMI 24.45 kg/m²      Body mass index is 24.45 kg/m².  Wt Readings from Last 3 Encounters:   03/31/25 62.6 kg (138 lb)   03/28/25 64.4 kg (142 lb)   03/10/25 64.4 kg (142 lb)     Physical Exam  Constitutional:       General: She is not in acute distress.     Appearance: She is not ill-appearing.   Cardiovascular:      Rate and Rhythm: Normal rate.      Heart sounds: No murmur heard.  Pulmonary:      Effort: Pulmonary effort is normal. No respiratory distress.   Neurological:      Mental Status: She is oriented to person, place, and time.       Physical Exam      Results  Laboratory Studies  A1c was in prediabetes range last year.    Imaging  Bone density scan showed osteoporosis with a T-score of -3.5. MRI showed multiple mild compression fractures from T6-T10, T12, and L1 with mild marrow edema suggesting an acute to subacute component.  Labs:   Lab Results   Component Value Date    HGBA1C 5.7 (H) 07/06/2024    HGBA1C 5.9 (H) 08/02/2023    HGBA1C 5.7 (H) 08/25/2022     Lab Results   Component Value Date    CREATININE 0.64 03/11/2024    CREATININE 0.62 10/11/2019    CREATININE 0.76 04/16/2019    BUN 19 03/11/2024    K 4.2 03/11/2024     03/11/2024    CO2 31 03/11/2024     eGFR   Date Value Ref Range Status   03/11/2024 95 ml/min/1.73sq m Final     Lab Results   Component Value Date    CHOL 284 07/23/2015    HDL 91 07/06/2024    TRIG 87 07/06/2024     Lab Results "   Component Value Date    ALT 14 03/11/2024    AST 19 03/11/2024    ALKPHOS 54 03/11/2024     Lab Results   Component Value Date    WTN2JNHAUGGD <0.010 (L) 02/08/2025    LXG3EMABFRKP 6.968 (H) 12/11/2024    [id number] 0.072 (L) 09/26/2024     Lab Results   Component Value Date    FREET4 1.41 (H) 02/08/2025       There are no Patient Instructions on file for this visit.    Discussed with the patient and all questioned fully answered. She will call me if any problems arise.    MRI THORACIC SPINE WITHOUT CONTRAST     INDICATION: S22.000A: Wedge compression fracture of unspecified thoracic vertebra, initial encounter for closed fracture.     COMPARISON: Spine dated 2/25/2025. X-rays dated 3/14/2025.     TECHNIQUE:  Multiplanar, multisequence imaging of the thoracic spine was performed. .     IMAGE QUALITY: Diagnostic.     FINDINGS:     ALIGNMENT AND MARROW: Mildly exaggerated thoracic kyphosis. No thoracic subluxation. There are multiple mid thoracic compression fracture deformities involving superior endplates of T6-T10 as well as T12 and L1. Several of these appear chronic. However,   there are subtle marrow changes including mild marrow edema present within the T8, T9, T10 and L1 vertebral bodies suggesting an acute to subacute component of these mild compression fractures. There is mild dextroscoliosis centered at the T11 level.     THORACIC CORD: Normal signal within the thoracic cord.     PARAVERTEBRAL SOFT TISSUES:  Normal.     THORACIC DEGENERATIVE CHANGE: Minor mid thoracic degenerative change without a discrete disc herniation. No canal stenosis. No foraminal nerve compression.     OTHER FINDINGS: There is a simple cyst within the posterior midportion of the right kidney.     IMPRESSION:     There are multiple mild compression fracture deformities within the mid thoracic spine from T6-T10 as well as T12 and L1. Several of these appear chronic. However, there is mild marrow edema within the T8, T9, T10 and L1  vertebral bodies suggesting an   acute to subacute component.     No canal stenosis or foraminal narrowing.     DXA SCAN     CLINICAL HISTORY: 62 years postmenopausal female .   OTHER RISK FACTORS:  Fracture, SSRI therapy.     PHARMACOLOGIC THERAPY FOR OSTEOPOROSIS:  Prolia.     TECHNIQUE: Bone densitometry was performed using a Hologic Horizon A  bone densitometer.  Regions of interest appear properly placed.      COMPARISON: 12/29/2020.     RESULTS:      LUMBAR SPINE  Level: L1, L3, L4  (L2 vertebra excluded from analysis due to local structural abnormalities or artifact) :   BMD:  0.670  gm/cm2   T-score: -3.5         LEFT  TOTAL HIP:   BMD:  0.851  gm/cm2   T-score:  -0.7     LEFT  FEMORAL NECK:   BMD:  0.588  gm/cm2   T score: -2.4         IMPRESSION:     1.  Osteoporosis. [Based on the lumbar spine]     2.  Since a DXA study from 12/29/2020, there has been:  A  STATISTICALLY SIGNIFICANT INCREASE in bone mineral density of  0.029 g/cm2 (4.6%) in the lumbar spine.           3.  The 10 year risk of hip fracture is 3.0% with the 10 year risk of major osteoporotic fracture being 18% as calculated by the [employer] fracture risk assessment tool (FRAX, which is based on data generated by the WHO Collaborating [place]   for Metabolic Bone Diseases).     4.  The current NOF guidelines recommend treating patients with a T-score of -2.5 or less in the lumbar spine or hips, or in post-menopausal women and men over the age of 50 with low bone mass (osteopenia) and a FRAX 10 year risk score of >3% for hip   fracture and/or >20% for major osteoporotic fracture.     5.  The NOF recommends follow-up DXA in 1-2 years after initiating therapy for osteoporosis and every 2 years thereafter. More frequent evaluation is appropriate for patients with conditions associated with rapid bone loss, such as glucocorticoid   therapy. The interval between DXA screenings may be longer for individuals without major risk factors and  initial T-score in the normal or upper low bone mass range.     The FRAX algorithm has certain limitations:  -FRAX has not been validated in patients currently or previously treated with pharmacotherapy for osteoporosis.  In such patients, clinical judgment must be exercised in interpreting FRAX scores.    -Prior hip, vertebral and humeral fragility fractures appear to confer greater risk of subsequent fracture than fractures at other sites (this is especially true for individuals with severe vertebral fractures), but quantification of this incremental   risk is not possible with FRAX.  -FRAX underestimates fracture risk in patients with history of multiple fragility fractures.  -FRAX may underestimate fracture risk in patients with history of frequent falls.  -It is not appropriate to use FRAX to monitor treatment response.        WHO CLASSIFICATION:  Normal (a T-score of -1.0 or higher)  Low bone mineral density (a T-score of less than -1.0 but higher than -2.5)  Osteoporosis (a T-score of -2.5 or less)  Severe osteoporosis (a T-score of -2.5 or less with a fragility fracture)     LEAST SIGNIFICANT CHANGE (AT 95% C.I):  Lumbar spine: 0.022 g/cm2; 2.2%  Total hip: 0.017 g/cm2; 1.9%  Forearm: 0.032 g/cm2; 5.0%              Administrative Statements   I have spent a total time of 40 minutes in caring for this patient on the day of the visit/encounter including Diagnostic results, Prognosis, Risks and benefits of tx options, Instructions for management, Patient and family education, Importance of tx compliance, Risk factor reductions, Impressions, Counseling / Coordination of care, Documenting in the medical record, Reviewing/placing orders in the medical record (including tests, medications, and/or procedures), and Obtaining or reviewing history  .

## 2025-04-02 ENCOUNTER — PROCEDURE VISIT (OUTPATIENT)
Dept: FAMILY MEDICINE CLINIC | Facility: CLINIC | Age: 65
End: 2025-04-02
Payer: COMMERCIAL

## 2025-04-02 ENCOUNTER — TELEPHONE (OUTPATIENT)
Dept: NEUROSURGERY | Facility: CLINIC | Age: 65
End: 2025-04-02

## 2025-04-02 VITALS
BODY MASS INDEX: 24.7 KG/M2 | TEMPERATURE: 97.8 F | HEIGHT: 63 IN | OXYGEN SATURATION: 98 % | DIASTOLIC BLOOD PRESSURE: 82 MMHG | SYSTOLIC BLOOD PRESSURE: 128 MMHG | HEART RATE: 83 BPM | WEIGHT: 139.4 LBS

## 2025-04-02 DIAGNOSIS — S22.080A COMPRESSION FRACTURE OF T12 VERTEBRA, INITIAL ENCOUNTER (HCC): ICD-10-CM

## 2025-04-02 DIAGNOSIS — Z01.818 PREOPERATIVE CLEARANCE: Primary | ICD-10-CM

## 2025-04-02 DIAGNOSIS — M81.8 OTHER OSTEOPOROSIS WITHOUT CURRENT PATHOLOGICAL FRACTURE: ICD-10-CM

## 2025-04-02 PROCEDURE — 99214 OFFICE O/P EST MOD 30 MIN: CPT | Performed by: FAMILY MEDICINE

## 2025-04-03 ENCOUNTER — RESULTS FOLLOW-UP (OUTPATIENT)
Dept: FAMILY MEDICINE CLINIC | Facility: CLINIC | Age: 65
End: 2025-04-03

## 2025-04-03 ENCOUNTER — APPOINTMENT (OUTPATIENT)
Dept: LAB | Facility: MEDICAL CENTER | Age: 65
End: 2025-04-03
Payer: COMMERCIAL

## 2025-04-03 ENCOUNTER — APPOINTMENT (OUTPATIENT)
Dept: RADIOLOGY | Facility: MEDICAL CENTER | Age: 65
End: 2025-04-03
Payer: COMMERCIAL

## 2025-04-03 ENCOUNTER — TELEPHONE (OUTPATIENT)
Dept: RADIOLOGY | Facility: HOSPITAL | Age: 65
End: 2025-04-03

## 2025-04-03 DIAGNOSIS — S92.902A CLOSED FRACTURE OF LEFT FOOT, INITIAL ENCOUNTER: ICD-10-CM

## 2025-04-03 DIAGNOSIS — M80.08XA AGE-RELATED OSTEOPOROSIS WITH CURRENT PATHOLOGICAL FRACTURE OF VERTEBRA (HCC): ICD-10-CM

## 2025-04-03 DIAGNOSIS — S82.839A AVULSION FRACTURE OF DISTAL END OF FIBULA: ICD-10-CM

## 2025-04-03 DIAGNOSIS — Z00.8 HEALTH EXAMINATION IN POPULATION SURVEYS: ICD-10-CM

## 2025-04-03 DIAGNOSIS — E03.9 HYPOTHYROIDISM, UNSPECIFIED TYPE: ICD-10-CM

## 2025-04-03 DIAGNOSIS — S22.000A THORACIC COMPRESSION FRACTURE (HCC): ICD-10-CM

## 2025-04-03 LAB
25(OH)D3 SERPL-MCNC: 30.6 NG/ML (ref 30–100)
ALBUMIN SERPL BCG-MCNC: 4.7 G/DL (ref 3.5–5)
ALP SERPL-CCNC: 121 U/L (ref 34–104)
ALT SERPL W P-5'-P-CCNC: 14 U/L (ref 7–52)
ANION GAP SERPL CALCULATED.3IONS-SCNC: 9 MMOL/L (ref 4–13)
APTT PPP: 28 SECONDS (ref 23–34)
AST SERPL W P-5'-P-CCNC: 30 U/L (ref 13–39)
BASOPHILS # BLD AUTO: 0.03 THOUSANDS/ÂΜL (ref 0–0.1)
BASOPHILS NFR BLD AUTO: 1 % (ref 0–1)
BILIRUB SERPL-MCNC: 0.48 MG/DL (ref 0.2–1)
BILIRUB UR QL STRIP: NEGATIVE
BUN SERPL-MCNC: 14 MG/DL (ref 5–25)
CALCIUM SERPL-MCNC: 8.8 MG/DL (ref 8.4–10.2)
CHLORIDE SERPL-SCNC: 100 MMOL/L (ref 96–108)
CHOLEST SERPL-MCNC: 249 MG/DL (ref ?–200)
CLARITY UR: CLEAR
CO2 SERPL-SCNC: 29 MMOL/L (ref 21–32)
COLOR UR: COLORLESS
CREAT SERPL-MCNC: 0.57 MG/DL (ref 0.6–1.3)
EOSINOPHIL # BLD AUTO: 0.11 THOUSAND/ÂΜL (ref 0–0.61)
EOSINOPHIL NFR BLD AUTO: 2 % (ref 0–6)
ERYTHROCYTE [DISTWIDTH] IN BLOOD BY AUTOMATED COUNT: 13.2 % (ref 11.6–15.1)
EST. AVERAGE GLUCOSE BLD GHB EST-MCNC: 126 MG/DL
GFR SERPL CREATININE-BSD FRML MDRD: 98 ML/MIN/1.73SQ M
GLUCOSE P FAST SERPL-MCNC: 87 MG/DL (ref 65–99)
GLUCOSE UR STRIP-MCNC: NEGATIVE MG/DL
HBA1C MFR BLD: 6 %
HCT VFR BLD AUTO: 39.3 % (ref 34.8–46.1)
HDLC SERPL-MCNC: 88 MG/DL
HGB BLD-MCNC: 12.2 G/DL (ref 11.5–15.4)
HGB UR QL STRIP.AUTO: NEGATIVE
IGA SERPL-MCNC: 180 MG/DL (ref 66–433)
IMM GRANULOCYTES # BLD AUTO: 0.01 THOUSAND/UL (ref 0–0.2)
IMM GRANULOCYTES NFR BLD AUTO: 0 % (ref 0–2)
INR PPP: 0.86 (ref 0.85–1.19)
KETONES UR STRIP-MCNC: NEGATIVE MG/DL
LDLC SERPL CALC-MCNC: 147 MG/DL (ref 0–100)
LEUKOCYTE ESTERASE UR QL STRIP: NEGATIVE
LYMPHOCYTES # BLD AUTO: 1.52 THOUSANDS/ÂΜL (ref 0.6–4.47)
LYMPHOCYTES NFR BLD AUTO: 28 % (ref 14–44)
MCH RBC QN AUTO: 30.1 PG (ref 26.8–34.3)
MCHC RBC AUTO-ENTMCNC: 31 G/DL (ref 31.4–37.4)
MCV RBC AUTO: 97 FL (ref 82–98)
MONOCYTES # BLD AUTO: 0.39 THOUSAND/ÂΜL (ref 0.17–1.22)
MONOCYTES NFR BLD AUTO: 7 % (ref 4–12)
NEUTROPHILS # BLD AUTO: 3.43 THOUSANDS/ÂΜL (ref 1.85–7.62)
NEUTS SEG NFR BLD AUTO: 62 % (ref 43–75)
NITRITE UR QL STRIP: NEGATIVE
NONHDLC SERPL-MCNC: 161 MG/DL
NRBC BLD AUTO-RTO: 0 /100 WBCS
PH UR STRIP.AUTO: 6.5 [PH]
PLATELET # BLD AUTO: 318 THOUSANDS/UL (ref 149–390)
PMV BLD AUTO: 9.6 FL (ref 8.9–12.7)
POTASSIUM SERPL-SCNC: 3.8 MMOL/L (ref 3.5–5.3)
PROT SERPL-MCNC: 7.6 G/DL (ref 6.4–8.4)
PROT UR STRIP-MCNC: NEGATIVE MG/DL
PROTHROMBIN TIME: 12.1 SECONDS (ref 12.3–15)
PTH-INTACT SERPL-MCNC: 83.2 PG/ML (ref 12–88)
RBC # BLD AUTO: 4.05 MILLION/UL (ref 3.81–5.12)
SODIUM SERPL-SCNC: 138 MMOL/L (ref 135–147)
SP GR UR STRIP.AUTO: 1.01 (ref 1–1.03)
TRIGL SERPL-MCNC: 71 MG/DL (ref ?–150)
TSH SERPL DL<=0.05 MIU/L-ACNC: 2.54 UIU/ML (ref 0.45–4.5)
UROBILINOGEN UR STRIP-ACNC: <2 MG/DL
WBC # BLD AUTO: 5.49 THOUSAND/UL (ref 4.31–10.16)

## 2025-04-03 PROCEDURE — 82306 VITAMIN D 25 HYDROXY: CPT | Performed by: STUDENT IN AN ORGANIZED HEALTH CARE EDUCATION/TRAINING PROGRAM

## 2025-04-03 PROCEDURE — 80061 LIPID PANEL: CPT

## 2025-04-03 PROCEDURE — 80053 COMPREHEN METABOLIC PANEL: CPT

## 2025-04-03 PROCEDURE — 36415 COLL VENOUS BLD VENIPUNCTURE: CPT | Performed by: STUDENT IN AN ORGANIZED HEALTH CARE EDUCATION/TRAINING PROGRAM

## 2025-04-03 PROCEDURE — 86334 IMMUNOFIX E-PHORESIS SERUM: CPT | Performed by: STUDENT IN AN ORGANIZED HEALTH CARE EDUCATION/TRAINING PROGRAM

## 2025-04-03 PROCEDURE — 81003 URINALYSIS AUTO W/O SCOPE: CPT

## 2025-04-03 PROCEDURE — 82523 COLLAGEN CROSSLINKS: CPT | Performed by: STUDENT IN AN ORGANIZED HEALTH CARE EDUCATION/TRAINING PROGRAM

## 2025-04-03 PROCEDURE — 73630 X-RAY EXAM OF FOOT: CPT

## 2025-04-03 PROCEDURE — 82570 ASSAY OF URINE CREATININE: CPT | Performed by: STUDENT IN AN ORGANIZED HEALTH CARE EDUCATION/TRAINING PROGRAM

## 2025-04-03 PROCEDURE — 83036 HEMOGLOBIN GLYCOSYLATED A1C: CPT

## 2025-04-03 PROCEDURE — 73610 X-RAY EXAM OF ANKLE: CPT

## 2025-04-03 PROCEDURE — 85025 COMPLETE CBC W/AUTO DIFF WBC: CPT

## 2025-04-03 PROCEDURE — 84165 PROTEIN E-PHORESIS SERUM: CPT | Performed by: STUDENT IN AN ORGANIZED HEALTH CARE EDUCATION/TRAINING PROGRAM

## 2025-04-03 PROCEDURE — 82784 ASSAY IGA/IGD/IGG/IGM EACH: CPT

## 2025-04-03 PROCEDURE — 85730 THROMBOPLASTIN TIME PARTIAL: CPT

## 2025-04-03 PROCEDURE — 83970 ASSAY OF PARATHORMONE: CPT | Performed by: STUDENT IN AN ORGANIZED HEALTH CARE EDUCATION/TRAINING PROGRAM

## 2025-04-03 PROCEDURE — 84443 ASSAY THYROID STIM HORMONE: CPT

## 2025-04-03 PROCEDURE — 85610 PROTHROMBIN TIME: CPT

## 2025-04-03 PROCEDURE — 86364 TISS TRNSGLTMNASE EA IG CLAS: CPT

## 2025-04-03 NOTE — PROGRESS NOTES
Name: Lisa Canseco      : 1960      MRN: [id number]  Encounter Provider: Delma Emerson DO  Encounter Date: 2025   Encounter department: [facility]    Assessment & Plan  Preoperative clearance  The patient is cleared medically for her kyphoplasty.         Compression fracture of T12 vertebra, initial encounter ([facility])  Continue with current pain control until patient has her kyphoplasty       Other osteoporosis without current pathological fracture  Await repeat DEXA scan and lab results; await recommendations as per endocrinology.            History of Present Illness     The patient presents for preop clearance for kyphoplasty of T12 compression fracture.    Pre-op Exam  Surgery: Kyphoplasty of T12  Anticipated Date of Surgery: 25  Surgeon: Dr. Barba     the patient presents today with low back pain and upon workup was found to have a T12 compression fracture.  She has not responded to conservative treatment with oral meds and injectable medications and epidurals.  She will be having a kyphoplasty of T12 on 2025.    Previous history of bleeding disorders or clots?: No  Previous Anesthesia reaction?: No  Prolonged steroid use in the last 6 months?: No    Assessment of Cardiac Risk:   - Unstable or severe angina or MI in the last 6 weeks or history of stent placement in the last year?: No   - Decompensated heart failure (e.g. New onset heart failure, NYHA  Class IV heart failure, or worsening existing heart failure)?: No  - Significant arrhythmias such as high grade AV block, symptomatic ventricular arrhythmia, newly recognized ventricular tachycardia, supraventricular tachycardia with resting heart rate >100, or symptomatic bradycardia?: No  - Severe heart valve disease including aortic stenosis or symptomatic mitral stenosis?: No      Pre-operative Risk Factors:  Elevated-risk surgery: No    History of cerebrovascular disease: No    History of ischemic  heart disease: No  Pre-operative treatment with insulin: No  Pre-operative creatinine >2 mg/dL: No    History of congestive heart failure: No    Review of Systems   Constitutional:  Negative for appetite change, chills and fever.   HENT:  Negative for ear pain, facial swelling, rhinorrhea, sinus pain, sore throat and trouble swallowing.    Eyes:  Negative for discharge and redness.   Respiratory:  Negative for chest tightness, shortness of breath and wheezing.    Cardiovascular:  Negative for chest pain and palpitations.   Gastrointestinal:  Negative for abdominal pain, diarrhea, nausea and vomiting.   Endocrine: Negative for polyuria.   Genitourinary:  Negative for dysuria and urgency.   Musculoskeletal:  Positive for back pain. Negative for arthralgias.        Positive thoracic or lumbar back pain  Positive low back muscle spasms   Skin:  Negative for rash.   Neurological:  Negative for dizziness, weakness and headaches.   Hematological:  Negative for adenopathy.   Psychiatric/Behavioral:  Negative for behavioral problems, confusion and sleep disturbance.    All other systems reviewed and are negative.    Past Medical History:   Diagnosis Date    Anxiety 2000    Many years,ocd,meds started year 2000    Cataract, right     Closed fracture of right distal fibula 3/22/2022    Detached retina, right     Disease of thyroid gland     OCD (obsessive compulsive disorder)     Seasonal allergies     Wrist fracture      Past Surgical History:   Procedure Laterality Date    CATARACT EXTRACTION      EYE SURGERY      FOOT SURGERY      MANDIBLE FRACTURE SURGERY      TONSILLECTOMY       Family History   Problem Relation Age of Onset    Heart disease Mother         cardiac    Colon cancer Father 65    Heart disease Father     OCD Father     No Known Problems Sister     No Known Problems Daughter     No Known Problems Maternal Grandmother     No Known Problems Maternal Grandfather     No Known Problems Paternal Grandmother      "Prostate cancer Paternal Grandfather     No Known Problems Sister     No Known Problems Daughter     No Known Problems Maternal Aunt     No Known Problems Paternal Aunt     No Known Problems Paternal Aunt      Social History     Tobacco Use    Smoking status: Never     Passive exposure: Past    Smokeless tobacco: Never   Vaping Use    Vaping status: Never Used   Substance and Sexual Activity    Alcohol use: Yes     Alcohol/week: 2.0 standard drinks of alcohol     Types: 2 Cans of beer per week     Comment: social drinking, not often    Drug use: Never    Sexual activity: Never     Current Outpatient Medications on File Prior to Visit   Medication Sig    Denosumab (PROLIA SC) Inject under the skin    methocarbamol (ROBAXIN) 500 mg tablet Take 1 tablet (500 mg total) by mouth 3 (three) times a day as needed for muscle spasms    oxyCODONE-acetaminophen (Percocet) 7.5-325 MG per tablet Take 1 tablet by mouth 3 (three) times a day as needed for moderate pain Max Daily Amount: 3 tablets    sertraline (ZOLOFT) 100 mg tablet TAKE ONE TABLET BY MOUTH EVERY DAY    Synthroid 150 MCG tablet Take 1 tablet (150 mcg total) by mouth daily    valACYclovir (VALTREX) 1,000 mg tablet Take 1 tablet (1,000 mg total) by mouth 2 (two) times a day as needed (cold sores)    VITAMIN D, CHOLECALCIFEROL, PO Take by mouth     No Known Allergies  Immunization History   Administered Date(s) Administered    COVID-19 MODERNA VACC 0.5 ML IM 01/06/2021, 02/03/2021    H1N1, All Formulations 11/05/2009    INFLUENZA 10/29/2023    Influenza Quadrivalent 3 years and older 10/10/2019    Tdap 03/04/2024    Zoster Vaccine Recombinant 02/13/2023, 05/23/2023     Objective   /82 (BP Location: Right arm, Patient Position: Sitting, Cuff Size: Standard)   Pulse 83   Temp 97.8 °F (36.6 °C) (Temporal)   Ht 5' 3\" (1.6 m)   Wt 63.2 kg (139 lb 6.4 oz)   SpO2 98%   BMI 24.69 kg/m²     Physical Exam  Vitals and nursing note reviewed.   Constitutional:       " General: She is not in acute distress.     Appearance: Normal appearance. She is well-developed. She is not ill-appearing or diaphoretic.   HENT:      Head: Normocephalic and atraumatic.      Right Ear: Tympanic membrane, ear canal and external ear normal.      Left Ear: Tympanic membrane, ear canal and external ear normal.      Nose: Nose normal. No congestion or rhinorrhea.      Mouth/Throat:      Mouth: Mucous membranes are moist.      Pharynx: Oropharynx is clear. No oropharyngeal exudate or posterior oropharyngeal erythema.   Eyes:      General: No scleral icterus.        Right eye: No discharge.         Left eye: No discharge.      Extraocular Movements: Extraocular movements intact.      Conjunctiva/sclera: Conjunctivae normal.      Pupils: Pupils are equal, round, and reactive to light.   Neck:      Thyroid: No thyromegaly.      Vascular: No carotid bruit or JVD.      Trachea: No tracheal deviation.   Cardiovascular:      Rate and Rhythm: Normal rate and regular rhythm.      Pulses: Normal pulses.      Heart sounds: Normal heart sounds. No murmur heard.  Pulmonary:      Effort: Pulmonary effort is normal. No respiratory distress.      Breath sounds: Normal breath sounds. No stridor. No wheezing, rhonchi or rales.   Abdominal:      General: Abdomen is flat. Bowel sounds are normal. There is no distension.      Palpations: Abdomen is soft. There is no mass.      Tenderness: There is no abdominal tenderness. There is no guarding or rebound.   Musculoskeletal:         General: No swelling, tenderness, deformity or signs of injury. Normal range of motion.      Cervical back: Normal range of motion and neck supple. No rigidity.      Right lower leg: No edema.      Left lower leg: No edema.      Comments: Patient was in an immobilizer brace for her low back so was unable to examine her low back   Lymphadenopathy:      Cervical: No cervical adenopathy.   Skin:     General: Skin is warm and dry.      Capillary  Refill: Capillary refill takes less than 2 seconds.      Coloration: Skin is not jaundiced.      Findings: No bruising, erythema or rash.   Neurological:      General: No focal deficit present.      Mental Status: She is alert and oriented to person, place, and time.      Cranial Nerves: No cranial nerve deficit.      Sensory: No sensory deficit.      Motor: No abnormal muscle tone.      Coordination: Coordination normal.      Gait: Gait normal.      Deep Tendon Reflexes: Reflexes are normal and symmetric. Reflexes normal.   Psychiatric:         Mood and Affect: Mood normal.         Behavior: Behavior normal.         Thought Content: Thought content normal.         Judgment: Judgment normal.

## 2025-04-03 NOTE — PRE-PROCEDURE INSTRUCTIONS
Pre-procedure Instructions for Interventional Radiology  [address] [address]  INTERVENTIONAL RADIOLOGY [phone]    You are scheduled for a/an Kyphoplasty T-9,T-10 and L-1 .    On Wednesday 4/9/25.    Your tentative arrival time is 12 Noon.  Short stay will notify you the day before your procedure with the exact arrival time and the location to arrive.    To prepare for your procedure:  Please arrange for someone to drive you home after the procedure and stay with you until the next morning if you are instructed to do so.  This is typically for patients receiving some type of sedative or anesthetic for the procedure.  DO NOT EAT OR DRINK ANYTHING after midnight on the evening before your procedure including candy & gum.  ONLY SIPS OF WATER with your medications are allowed on the morning of your procedure.  TAKE ALL OF YOUR REGULAR MEDICATIONS THE MORNING OF YOUR PROCEDURE with sips of water!  We may call you to stop some of your blood sugar, blood pressure and blood thinning medications depending on the procedure.  Please take all of these medications unless we instruct you to stop them.  If you have an allergy to x-ray dye, please contact Interventional Radiology for an x-ray dye preparation which usually consists of an oral steroid and Benadryl.  If you wear a Glucose Monitor, you may be asked to remove it for your procedure if we are using x-ray.  These devices need to be removed when we are imaging with x-ray near the device since the radiation can cause the unit to malfunction.  If possible and not too inconvenient, you may want to schedule your exam closer to day 14 of your 14 day device so your device is not wasted.    The day of your procedure:  Bring a list of the medications you take at home.  Bring medications you take for breathing problems (such as inhalers), medications for chest pain, or both.  Bring a case for your glasses or contacts.  Bring your  insurance card and a form of photo ID.  Please leave all valuables such as credit cards and jewelry at home.  Report to the admitting office to the left of the registration desk in the main lobby at the [facility], Entrance B.  You will then be directed to the Short Stay Center.  While your procedure is being performed, your family may wait in the Radiology Waiting Room on the 1st floor in Radiology.  if they need to leave, they may provide a number to be called following the procedure.   Be prepared to stay overnight just in case. Sometimes procedures will indicate the need for further observation or treatment.   If you are scheduled for a follow-up visit with the Interventional Radiologist after your procedure, you will be called with a date and time.    Special Instructions (Medications to stop taking before your procedure etc.):

## 2025-04-04 ENCOUNTER — RESULTS FOLLOW-UP (OUTPATIENT)
Dept: ENDOCRINOLOGY | Facility: CLINIC | Age: 65
End: 2025-04-04

## 2025-04-04 LAB
ALBUMIN SERPL ELPH-MCNC: 4.42 G/DL (ref 3.2–5.1)
ALBUMIN SERPL ELPH-MCNC: 60.6 % (ref 48–70)
ALPHA1 GLOB SERPL ELPH-MCNC: 0.36 G/DL (ref 0.15–0.47)
ALPHA1 GLOB SERPL ELPH-MCNC: 4.9 % (ref 1.8–7)
ALPHA2 GLOB SERPL ELPH-MCNC: 0.77 G/DL (ref 0.42–1.04)
ALPHA2 GLOB SERPL ELPH-MCNC: 10.5 % (ref 5.9–14.9)
BETA GLOB ABNORMAL SERPL ELPH-MCNC: 0.46 G/DL (ref 0.31–0.57)
BETA1 GLOB SERPL ELPH-MCNC: 6.3 % (ref 4.7–7.7)
BETA2 GLOB SERPL ELPH-MCNC: 5.1 % (ref 3.1–7.9)
BETA2+GAMMA GLOB SERPL ELPH-MCNC: 0.37 G/DL (ref 0.2–0.58)
GAMMA GLOB ABNORMAL SERPL ELPH-MCNC: 0.92 G/DL (ref 0.4–1.66)
GAMMA GLOB SERPL ELPH-MCNC: 12.6 % (ref 6.9–22.3)
IGG/ALB SER: 1.54 {RATIO} (ref 1.1–1.8)
PROT SERPL-MCNC: 7.3 G/DL (ref 6.4–8.4)

## 2025-04-04 PROCEDURE — 86334 IMMUNOFIX E-PHORESIS SERUM: CPT | Performed by: STUDENT IN AN ORGANIZED HEALTH CARE EDUCATION/TRAINING PROGRAM

## 2025-04-04 PROCEDURE — 84165 PROTEIN E-PHORESIS SERUM: CPT | Performed by: STUDENT IN AN ORGANIZED HEALTH CARE EDUCATION/TRAINING PROGRAM

## 2025-04-06 LAB — TTG IGA SER IA-ACNC: 0.7 U/ML (ref ?–10)

## 2025-04-08 ENCOUNTER — TELEPHONE (OUTPATIENT)
Dept: ENDOCRINOLOGY | Facility: CLINIC | Age: 65
End: 2025-04-08

## 2025-04-09 ENCOUNTER — ANESTHESIA (OUTPATIENT)
Dept: RADIOLOGY | Facility: HOSPITAL | Age: 65
End: 2025-04-09
Payer: COMMERCIAL

## 2025-04-09 ENCOUNTER — HOSPITAL ENCOUNTER (OUTPATIENT)
Dept: RADIOLOGY | Facility: HOSPITAL | Age: 65
Discharge: HOME | End: 2025-04-09
Attending: RADIOLOGY
Payer: COMMERCIAL

## 2025-04-09 ENCOUNTER — ANESTHESIA EVENT (OUTPATIENT)
Dept: RADIOLOGY | Facility: HOSPITAL | Age: 65
End: 2025-04-09
Payer: COMMERCIAL

## 2025-04-09 VITALS
BODY MASS INDEX: 23.92 KG/M2 | WEIGHT: 135 LBS | TEMPERATURE: 98 F | HEIGHT: 63 IN | HEART RATE: 87 BPM | OXYGEN SATURATION: 90 % | DIASTOLIC BLOOD PRESSURE: 73 MMHG | RESPIRATION RATE: 17 BRPM | SYSTOLIC BLOOD PRESSURE: 138 MMHG

## 2025-04-09 DIAGNOSIS — M80.08XA AGE-RELATED OSTEOPOROSIS WITH CURRENT PATHOLOGICAL FRACTURE OF VERTEBRA (HCC): ICD-10-CM

## 2025-04-09 PROCEDURE — 88341 IMHCHEM/IMCYTCHM EA ADD ANTB: CPT | Performed by: PATHOLOGY

## 2025-04-09 PROCEDURE — 88342 IMHCHEM/IMCYTCHM 1ST ANTB: CPT | Performed by: PATHOLOGY

## 2025-04-09 PROCEDURE — 22514 PERQ VERTEBRAL AUGMENTATION: CPT

## 2025-04-09 PROCEDURE — 88304 TISSUE EXAM BY PATHOLOGIST: CPT | Performed by: PATHOLOGY

## 2025-04-09 PROCEDURE — 22515 PERQ VERTEBRAL AUGMENTATION: CPT

## 2025-04-09 PROCEDURE — 20225 BONE BIOPSY TROCAR/NDL DEEP: CPT

## 2025-04-09 PROCEDURE — 88365 INSITU HYBRIDIZATION (FISH): CPT | Performed by: PATHOLOGY

## 2025-04-09 PROCEDURE — 88311 DECALCIFY TISSUE: CPT | Performed by: PATHOLOGY

## 2025-04-09 PROCEDURE — C1713 ANCHOR/SCREW BN/BN,TIS/BN: HCPCS

## 2025-04-09 PROCEDURE — 22513 PERQ VERTEBRAL AUGMENTATION: CPT

## 2025-04-09 PROCEDURE — 88313 SPECIAL STAINS GROUP 2: CPT | Performed by: PATHOLOGY

## 2025-04-09 PROCEDURE — 88364 INSITU HYBRIDIZATION (FISH): CPT | Performed by: PATHOLOGY

## 2025-04-09 RX ORDER — ROCURONIUM BROMIDE 10 MG/ML
INJECTION, SOLUTION INTRAVENOUS AS NEEDED
Status: DISCONTINUED | OUTPATIENT
Start: 2025-04-09 | End: 2025-04-09

## 2025-04-09 RX ORDER — BUPIVACAINE HYDROCHLORIDE AND EPINEPHRINE 5; 5 MG/ML; UG/ML
INJECTION, SOLUTION EPIDURAL; INTRACAUDAL; PERINEURAL AS NEEDED
Status: COMPLETED | OUTPATIENT
Start: 2025-04-09 | End: 2025-04-09

## 2025-04-09 RX ORDER — HYDROMORPHONE HCL/PF 1 MG/ML
0.25 SYRINGE (ML) INJECTION
Refills: 0 | Status: DISCONTINUED | OUTPATIENT
Start: 2025-04-09 | End: 2025-04-09 | Stop reason: HOSPADM

## 2025-04-09 RX ORDER — LIDOCAINE HYDROCHLORIDE 20 MG/ML
INJECTION, SOLUTION EPIDURAL; INFILTRATION; INTRACAUDAL; PERINEURAL AS NEEDED
Status: DISCONTINUED | OUTPATIENT
Start: 2025-04-09 | End: 2025-04-09

## 2025-04-09 RX ORDER — FENTANYL CITRATE 50 UG/ML
INJECTION, SOLUTION INTRAMUSCULAR; INTRAVENOUS AS NEEDED
Status: DISCONTINUED | OUTPATIENT
Start: 2025-04-09 | End: 2025-04-09

## 2025-04-09 RX ORDER — PROPOFOL 10 MG/ML
INJECTION, EMULSION INTRAVENOUS AS NEEDED
Status: DISCONTINUED | OUTPATIENT
Start: 2025-04-09 | End: 2025-04-09

## 2025-04-09 RX ORDER — CEFAZOLIN SODIUM 2 G/50ML
2000 SOLUTION INTRAVENOUS ONCE
Status: DISCONTINUED | OUTPATIENT
Start: 2025-04-09 | End: 2025-04-10 | Stop reason: HOSPADM

## 2025-04-09 RX ORDER — SODIUM CHLORIDE 9 MG/ML
75 INJECTION, SOLUTION INTRAVENOUS CONTINUOUS
Status: DISCONTINUED | OUTPATIENT
Start: 2025-04-09 | End: 2025-04-10 | Stop reason: HOSPADM

## 2025-04-09 RX ORDER — ONDANSETRON 2 MG/ML
4 INJECTION INTRAMUSCULAR; INTRAVENOUS ONCE AS NEEDED
Status: DISCONTINUED | OUTPATIENT
Start: 2025-04-09 | End: 2025-04-09 | Stop reason: HOSPADM

## 2025-04-09 RX ORDER — MIDAZOLAM HYDROCHLORIDE 2 MG/2ML
INJECTION, SOLUTION INTRAMUSCULAR; INTRAVENOUS AS NEEDED
Status: DISCONTINUED | OUTPATIENT
Start: 2025-04-09 | End: 2025-04-09

## 2025-04-09 RX ORDER — PROPOFOL 10 MG/ML
INJECTION, EMULSION INTRAVENOUS CONTINUOUS PRN
Status: DISCONTINUED | OUTPATIENT
Start: 2025-04-09 | End: 2025-04-09

## 2025-04-09 RX ORDER — CEFAZOLIN SODIUM 1 G/3ML
INJECTION, POWDER, FOR SOLUTION INTRAMUSCULAR; INTRAVENOUS AS NEEDED
Status: DISCONTINUED | OUTPATIENT
Start: 2025-04-09 | End: 2025-04-09

## 2025-04-09 RX ORDER — DEXAMETHASONE SODIUM PHOSPHATE 10 MG/ML
INJECTION, SOLUTION INTRAMUSCULAR; INTRAVENOUS AS NEEDED
Status: DISCONTINUED | OUTPATIENT
Start: 2025-04-09 | End: 2025-04-09

## 2025-04-09 RX ORDER — ONDANSETRON 2 MG/ML
INJECTION INTRAMUSCULAR; INTRAVENOUS AS NEEDED
Status: DISCONTINUED | OUTPATIENT
Start: 2025-04-09 | End: 2025-04-09

## 2025-04-09 RX ORDER — LIDOCAINE HYDROCHLORIDE 10 MG/ML
INJECTION, SOLUTION EPIDURAL; INFILTRATION; INTRACAUDAL; PERINEURAL AS NEEDED
Status: COMPLETED | OUTPATIENT
Start: 2025-04-09 | End: 2025-04-09

## 2025-04-09 RX ORDER — FENTANYL CITRATE/PF 50 MCG/ML
25 SYRINGE (ML) INJECTION
Refills: 0 | Status: DISCONTINUED | OUTPATIENT
Start: 2025-04-09 | End: 2025-04-09 | Stop reason: HOSPADM

## 2025-04-09 RX ADMIN — BUPIVACAINE HYDROCHLORIDE AND EPINEPHRINE BITARTRATE 15 ML: 5; .005 INJECTION, SOLUTION EPIDURAL; INTRACAUDAL; PERINEURAL at 15:43

## 2025-04-09 RX ADMIN — ROCURONIUM BROMIDE 30 MG: 10 INJECTION, SOLUTION INTRAVENOUS at 14:18

## 2025-04-09 RX ADMIN — SODIUM CHLORIDE 75 ML/HR: 0.9 INJECTION, SOLUTION INTRAVENOUS at 12:12

## 2025-04-09 RX ADMIN — MIDAZOLAM 2 MG: 1 INJECTION INTRAMUSCULAR; INTRAVENOUS at 14:13

## 2025-04-09 RX ADMIN — FENTANYL CITRATE 50 MCG: 50 INJECTION INTRAMUSCULAR; INTRAVENOUS at 14:56

## 2025-04-09 RX ADMIN — SUGAMMADEX 200 MG: 100 INJECTION, SOLUTION INTRAVENOUS at 15:43

## 2025-04-09 RX ADMIN — CEFAZOLIN 2000 MG: 1 INJECTION, POWDER, FOR SOLUTION INTRAMUSCULAR; INTRAVENOUS at 14:34

## 2025-04-09 RX ADMIN — LIDOCAINE HYDROCHLORIDE 60 MG: 20 INJECTION, SOLUTION EPIDURAL; INFILTRATION; INTRACAUDAL at 14:18

## 2025-04-09 RX ADMIN — PROPOFOL 100 MCG/KG/MIN: 10 INJECTION, EMULSION INTRAVENOUS at 14:31

## 2025-04-09 RX ADMIN — LIDOCAINE HYDROCHLORIDE 30 ML: 10 INJECTION, SOLUTION EPIDURAL; INFILTRATION; INTRACAUDAL; PERINEURAL at 15:43

## 2025-04-09 RX ADMIN — DEXAMETHASONE SODIUM PHOSPHATE 5 MG: 10 INJECTION, SOLUTION INTRAMUSCULAR; INTRAVENOUS at 14:18

## 2025-04-09 RX ADMIN — FENTANYL CITRATE 50 MCG: 50 INJECTION INTRAMUSCULAR; INTRAVENOUS at 14:18

## 2025-04-09 RX ADMIN — ONDANSETRON 4 MG: 2 INJECTION, SOLUTION INTRAMUSCULAR; INTRAVENOUS at 14:18

## 2025-04-09 RX ADMIN — ROCURONIUM BROMIDE 20 MG: 10 INJECTION, SOLUTION INTRAVENOUS at 14:56

## 2025-04-09 RX ADMIN — PROPOFOL 100 MG: 10 INJECTION, EMULSION INTRAVENOUS at 14:18

## 2025-04-09 NOTE — ANESTHESIA POSTPROCEDURE EVALUATION
Post-Op Assessment Note    CV Status:  Stable    Pain management: adequate       Mental Status:  Alert and awake   Hydration Status:  Euvolemic   PONV Controlled:  Controlled   Airway Patency:  Patent     Post Op Vitals Reviewed: Yes    No anethesia notable event occurred.    Staff: Anesthesiologist, CRNA           Last Filed PACU Vitals:  Vitals Value Taken Time   Temp 9812    Pulse 85 04/09/25 1558   /58 04/09/25 1559   Resp     SpO2 99 % 04/09/25 1558   Vitals shown include unfiled device data.

## 2025-04-09 NOTE — ANESTHESIA PREPROCEDURE EVALUATION
Procedure:  IR KYPHOPLASTY/VERTEBROPLASTY    Relevant Problems   ENDO   (+) Hypothyroidism      NEURO/PSYCH   (+) Anxiety        Physical Exam    Airway    Mallampati score: II  TM Distance: >3 FB  Neck ROM: full     Dental   No notable dental hx     Cardiovascular  Rhythm: regular, Rate: normal    Pulmonary   Breath sounds clear to auscultation    Other Findings  post-pubertal.      Anesthesia Plan  ASA Score- 2     Anesthesia Type- general with ASA Monitors.         Additional Monitors:     Airway Plan: ETT.           Plan Factors-Exercise tolerance (METS): >4 METS.    Chart reviewed. EKG reviewed.  Existing labs reviewed. Patient summary reviewed.    Patient is not a current smoker.              Induction- intravenous.    Postoperative Plan- Plan for postoperative opioid use.         Informed Consent- Anesthetic plan and risks discussed with patient.  I personally reviewed this patient with the CRNA. Discussed and agreed on the Anesthesia Plan with the CRNA..      NPO Status:  Vitals Value Taken Time   Date of last liquid 04/09/25 04/09/25 1205   Time of last liquid 0400 04/09/25 1205   Date of last solid 04/08/25 04/09/25 1205   Time of last solid 2000 04/09/25 1205

## 2025-04-09 NOTE — DISCHARGE INSTRUCTIONS
"Vertebroplasty   WHAT YOU NEED TO KNOW:   Vertebroplasty is a procedure to fix broken vertebrae. Vertebrae are the round, strong bones that form your spine. You have just had treatment for one or more compression fractures. It may take several weeks for complete pain relief. Continue to use the pain medicine that has been prescribed to you as needed.     DISCHARGE INSTRUCTIONS:   Resume your normal diet and medications. Small sips of flat soda will help with nausea.    Contact Interventional Radiology at [phone] (ERAN PATIENTS: Contact Interventional Radiology at [phone]) (GURDEEP PATIENTS: Contact Interventional Radiology at [phone]) if any of the following occur:  You have a fever greater than 101*.   You have persistent nausea or vomiting  You have worsening pain, even after you take your medicine.   You have increased trouble walking, moving around or numbness and weakness of legs.   You have pain in your ribs or lower back.   You have drainage from the area where your procedure was done.     Upon discharge from the hospital, limit your activity for the remainder of the day. No driving, operating heavy machinery or lifting heavy objects. The following morning, you may increase your activity level as tolerated.    General anesthesia in adults - Discharge instructions    The Basics  Written by the doctors and editors at [facility]  What are discharge instructions?  Discharge instructions are information about how to take care of yourself after getting medical care for a health problem.  What is general anesthesia?  This is medicine given before and during surgery or another procedure. It makes you unconscious so you can't feel, see, or hear anything during surgery. Some of the medicines are given through a thin tube that goes into a vein, called an \"IV.\" Others are gases that you breathe. Some people get a breathing tube to help them breathe while they are unconscious.  When your surgery is done, you " "might be moved to a \"post-anesthesia care unit\" (\"PACU\") to recover from general anesthesia. A team of doctors and nurses will watch you and check for any problems. They helps decide when it is safe for you to go home.  How do I care for myself at home?  Ask the doctor or nurse what you should do when you go home. Make sure that you understand exactly what you need to do to care for yourself. Ask questions if there is anything you do not understand.  You should also:  ? Take all of your medicines as instructed - Your doctor might give you medicines for pain or to help with nausea from the anesthesia. They will tell you when and how to take them.  ? Suck on ice chips or ice pops if you have a sore throat - You might have a sore throat for a short time if you had a breathing tube.  ? Be careful when standing up - You have a higher chance of falling down for at least 24 hours after general anesthesia. Move slowly, and be careful when getting up. Ask for help if you feel dizzy or like you might fall when you try to walk. Wear flat shoes with non-slip soles.  ? Eat when you are hungry - If you have nausea or a sore throat, it might help to start with clear liquids and foods that are easy to digest. Examples include soup, pudding, toast, or eggs. You can eat other types of foods when you feel ready.  For the first 24 hours that you are home:  ? Do not drive yourself or operate heavy or dangerous machinery.  ? Do not make any important decisions or sign any important papers.  ? Do not drink alcohol of any kind.  What follow-up care do I need?  Your doctor or nurse will tell you if you need to make a follow-up appointment after surgery. If so, make sure that you know when and where to go.  When should I call the doctor?  Call for advice if you:  ? Have trouble breathing  ? Have vomiting for more than 24 hours after going home, or cannot keep down any fluids  ? Are dizzy  ? Have any symptoms that worry you  All topics are " updated as new evidence becomes available and our peer review process is complete.  This topic retrieved from [employer] on: Mar 03, 2025.  Topic 061622 Version 3.0  Release: 33.1.2 - C33.61  © 2025 UpToDate, Inc. and/or its affiliates. All rights reserved.  Consumer Information Use and Disclaimer  Disclaimer: This generalized information is a limited summary of diagnosis, treatment, and/or medication information. It is not meant to be comprehensive and should be used as a tool to help the user understand and/or assess potential diagnostic and treatment options. It does NOT include all information about conditions, treatments, medications, side effects, or risks that may apply to a specific patient. It is not intended to be medical advice or a substitute for the medical advice, diagnosis, or treatment of a health care provider based on the health care provider's examination and assessment of a patient's specific and unique circumstances. Patients must speak with a health care provider for complete information about their health, medical questions, and treatment options, including any risks or benefits regarding use of medications. This information does not endorse any treatments or medications as safe, effective, or approved for treating a specific patient. UpToDate, Inc. and its affiliates disclaim any warranty or liability relating to this information or the use thereof.The use of this information is governed by the Terms of Use, available at https://www.[employer] Technologyuwer.com/en/know/clinical-effectiveness-terms. 2025© UpToDate, Inc. and its affiliates and/or licensors. All rights reserved.  © 2025 UpToDate, Inc. and/or its affiliates. All rights reserved.

## 2025-04-10 ENCOUNTER — TELEPHONE (OUTPATIENT)
Dept: NEUROSURGERY | Facility: CLINIC | Age: 65
End: 2025-04-10

## 2025-04-10 LAB — COLLAGEN CTX SERPL-MCNC: 102 PG/ML

## 2025-04-10 NOTE — ANESTHESIA POSTPROCEDURE EVALUATION
Post-Op Assessment Note    CV Status:  Stable  Pain Score: 0    Pain management: adequate       Mental Status:  Alert and awake   Hydration Status:  Euvolemic   PONV Controlled:  Controlled   Airway Patency:  Patent     Post Op Vitals Reviewed: Yes    No anethesia notable event occurred.            Last Filed PACU Vitals:  Vitals Value Taken Time   Temp 98 °F (36.7 °C) 04/09/25 1630   Pulse 88 04/09/25 1631   /79 04/09/25 1630   Resp 45 04/09/25 1631   SpO2 91 % 04/09/25 1631   Vitals shown include unfiled device data.    Modified Mallory:     Vitals Value Taken Time   Activity 2 04/09/25 1630   Respiration 2 04/09/25 1630   Circulation 2 04/09/25 1630   Consciousness 2 04/09/25 1630   Oxygen Saturation 2 04/09/25 1630     Modified Mallory Score: 10

## 2025-04-10 NOTE — TELEPHONE ENCOUNTER
Spoke with Lisa Canseco to see how she is doing after kyphoplasty/vertebroplasty yesterday. She reports that she is doing well overall and denies any incisional issues or fevers.     Bandage can be removed after 48 hours and cleansed as usual. The site should be monitored for s/s of infection such as swelling, redness, or drainage. Call the office if any of these develop.     Advised that she should take it easy for the first few days and avoid any strenuous activity or heavy lifting >10 pounds.    If needed Tylenol can be used for pain following the procedure.     Verified date/time/location of her upcoming POV on 5/9/2025 and advised her to call the office with any further questions or concerns, or if any incisional issues or fevers would arise.     Patient was appreciative for the call.

## 2025-04-14 ENCOUNTER — HOSPITAL ENCOUNTER (OUTPATIENT)
Dept: RADIOLOGY | Facility: MEDICAL CENTER | Age: 65
Discharge: HOME | End: 2025-04-14
Payer: COMMERCIAL

## 2025-04-14 DIAGNOSIS — M81.8 OTHER OSTEOPOROSIS WITHOUT CURRENT PATHOLOGICAL FRACTURE: ICD-10-CM

## 2025-04-14 DIAGNOSIS — S22.080A COMPRESSION FRACTURE OF T12 VERTEBRA, INITIAL ENCOUNTER (HCC): ICD-10-CM

## 2025-04-14 DIAGNOSIS — S22.069A CLOSED FRACTURE OF EIGHTH THORACIC VERTEBRA, UNSPECIFIED FRACTURE MORPHOLOGY, INITIAL ENCOUNTER (HCC): ICD-10-CM

## 2025-04-14 DIAGNOSIS — S22.079A CLOSED FRACTURE OF NINTH THORACIC VERTEBRA, UNSPECIFIED FRACTURE MORPHOLOGY, INITIAL ENCOUNTER (HCC): ICD-10-CM

## 2025-04-14 DIAGNOSIS — S22.079A CLOSED FRACTURE OF TENTH THORACIC VERTEBRA, UNSPECIFIED FRACTURE MORPHOLOGY, INITIAL ENCOUNTER (HCC): ICD-10-CM

## 2025-04-14 PROCEDURE — 88311 DECALCIFY TISSUE: CPT | Performed by: PATHOLOGY

## 2025-04-14 PROCEDURE — 88342 IMHCHEM/IMCYTCHM 1ST ANTB: CPT | Performed by: PATHOLOGY

## 2025-04-14 PROCEDURE — 88304 TISSUE EXAM BY PATHOLOGIST: CPT | Performed by: PATHOLOGY

## 2025-04-14 PROCEDURE — 77080 DXA BONE DENSITY AXIAL: CPT

## 2025-04-16 LAB
COLLAGEN NTX UR-SCNC: 20 NMOL BCE
COLLAGEN NTX/CREAT UR-SRTO: 9 NM BCE/MM CR (ref 0–89)
CREAT UR-MCNC: 24.5 MG/DL
INTERPRETIVE GUIDE:: NORMAL

## 2025-04-25 ENCOUNTER — TELEPHONE (OUTPATIENT)
Dept: ENDOCRINOLOGY | Facility: CLINIC | Age: 65
End: 2025-04-25

## 2025-04-25 NOTE — TELEPHONE ENCOUNTER
----- Message from Bibi BOWERS sent at 4/24/2025 11:51 AM EDT -----  Good afternoon!    Approval Auth# [id number]. Valid 4/18/2025 to 10/18/2025 (only 6 months) Will need another auth for Nov through and including April  per [employer] website.     Thank you

## 2025-05-09 ENCOUNTER — TELEPHONE (OUTPATIENT)
Dept: NEUROSURGERY | Facility: CLINIC | Age: 65
End: 2025-05-09

## 2025-05-09 ENCOUNTER — OFFICE VISIT (OUTPATIENT)
Dept: NEUROSURGERY | Facility: CLINIC | Age: 65
End: 2025-05-09
Payer: COMMERCIAL

## 2025-05-09 VITALS
HEART RATE: 57 BPM | OXYGEN SATURATION: 98 % | HEIGHT: 63 IN | WEIGHT: 135 LBS | DIASTOLIC BLOOD PRESSURE: 58 MMHG | BODY MASS INDEX: 23.92 KG/M2 | RESPIRATION RATE: 15 BRPM | TEMPERATURE: 97.8 F | SYSTOLIC BLOOD PRESSURE: 124 MMHG

## 2025-05-09 DIAGNOSIS — M80.08XA AGE-RELATED OSTEOPOROSIS WITH CURRENT PATHOLOGICAL FRACTURE OF VERTEBRA (HCC): Primary | ICD-10-CM

## 2025-05-09 PROCEDURE — 99213 OFFICE O/P EST LOW 20 MIN: CPT | Performed by: RADIOLOGY

## 2025-05-09 NOTE — PROGRESS NOTES
Name: Lisa Canseco      : 1960      MRN: [id number]  Encounter Provider: Sean Barba MD  Encounter Date: 2025   Encounter [facility]  :  Assessment & Plan  Age-related osteoporosis with current pathological fracture of vertebra (HCC)  Lisa is doing very well following kyphoplasty.  I believe residual symptoms are due to her kyphosis and prolonged immobility. Without significant pain T8 kyphoplasty is not recommended. She may benefit from Physical therapy.  Otherwise she has no restrictions in her activities.  She will follow-up as needed.       Orders:  •  Ambulatory Referral to Physical Therapy; Future        History of Present Illness     Lisa Canseco is a 64 y.o. female who presents for follow-up of her compression fractures.  She is approximately 4 weeks status post T9, T10, and L1 vertebral body kyphoplasty.  She reports complete resolution of pain however has residual stiffness and mild ache.  Symptoms worsen after prolonged standing.  Denies any incisional redness, drainage or any fevers.  She received a Prolia injection in March states that she may be transition to Evenity.       HPI     Review of Systems   Gastrointestinal: Negative.    Genitourinary: Negative.    Musculoskeletal:  Positive for back pain (mid to lbp does not radiate-improving no pain, more stiffness) and gait problem (balance improving -not using cane anymore). Negative for myalgias.   Neurological:  Negative for seizures, weakness, light-headedness and numbness.   Hematological:  Does not bruise/bleed easily.   Psychiatric/Behavioral:  Negative for sleep disturbance.      I have personally reviewed the MA's review of systems and made changes as necessary.    Past Medical History   Past Medical History:   Diagnosis Date   • Anxiety     Many years,ocd,meds started year    • Cataract, right    • Closed fracture of right distal fibula 3/22/2022   • Detached retina,  "right    • Disease of thyroid gland    • OCD (obsessive compulsive disorder)    • Seasonal allergies    • Wrist fracture      Past Surgical History:   Procedure Laterality Date   • CATARACT EXTRACTION     • EYE SURGERY     • FOOT SURGERY     • IR KYPHOPLASTY/VERTEBROPLASTY  4/9/2025   • MANDIBLE FRACTURE SURGERY     • TONSILLECTOMY       Family History   Problem Relation Age of Onset   • Heart disease Mother         cardiac   • Colon cancer Father 65   • Heart disease Father    • OCD Father    • No Known Problems Sister    • No Known Problems Daughter    • No Known Problems Maternal Grandmother    • No Known Problems Maternal Grandfather    • No Known Problems Paternal Grandmother    • Prostate cancer Paternal Grandfather    • No Known Problems Sister    • No Known Problems Daughter    • No Known Problems Maternal Aunt    • No Known Problems Paternal Aunt    • No Known Problems Paternal Aunt      she reports that she has never smoked. She has been exposed to tobacco smoke. She has never used smokeless tobacco. She reports current alcohol use of about 2.0 standard drinks of alcohol per week. She reports that she does not use drugs.  Current Outpatient Medications   Medication Instructions   • Denosumab (PROLIA SC) Inject under the skin   • methocarbamol (ROBAXIN) 500 mg, Oral, 3 times daily PRN   • oxyCODONE-acetaminophen (Percocet) 7.5-325 MG per tablet 1 tablet, Oral, 3 times daily PRN   • sertraline (ZOLOFT) 100 mg tablet TAKE ONE TABLET BY MOUTH EVERY DAY   • Synthroid 150 mcg, Oral, Daily   • valACYclovir (VALTREX) 1,000 mg, Oral, 2 times daily PRN   • VITAMIN D, CHOLECALCIFEROL, PO Take by mouth   No Known Allergies   Objective   /58 (BP Location: Right arm, Patient Position: Sitting, Cuff Size: Standard)   Pulse 57   Temp 97.8 °F (36.6 °C) (Temporal)   Resp 15   Ht 5' 3\" (1.6 m)   Wt 61.2 kg (135 lb)   SpO2 98%   BMI 23.91 kg/m²     Physical Exam  Constitutional:       Appearance: Normal " appearance.   Eyes:      Pupils: Pupils are equal, round, and reactive to light.   Pulmonary:      Effort: Pulmonary effort is normal.   Musculoskeletal:         General: Normal range of motion.   Neurological:      General: No focal deficit present.      Mental Status: She is alert and oriented to person, place, and time.   Psychiatric:         Mood and Affect: Mood normal.         Behavior: Behavior normal.       Neurological Exam  Mental Status  Alert. Oriented to person, place, and time.    Cranial Nerves  CN III, IV, VI: Pupils equal round and reactive to light bilaterally.

## 2025-05-12 ENCOUNTER — TELEPHONE (OUTPATIENT)
Dept: NEUROSURGERY | Facility: CLINIC | Age: 65
End: 2025-05-12

## 2025-05-21 ENCOUNTER — APPOINTMENT (OUTPATIENT)
Dept: RADIOLOGY | Facility: MEDICAL CENTER | Age: 65
End: 2025-05-21
Payer: COMMERCIAL

## 2025-05-21 ENCOUNTER — TELEPHONE (OUTPATIENT)
Dept: NEUROSURGERY | Facility: CLINIC | Age: 65
End: 2025-05-21

## 2025-05-21 DIAGNOSIS — S22.000A THORACIC COMPRESSION FRACTURE (HCC): Primary | ICD-10-CM

## 2025-05-21 DIAGNOSIS — S22.000A THORACIC COMPRESSION FRACTURE (HCC): ICD-10-CM

## 2025-05-21 PROCEDURE — 72070 X-RAY EXAM THORAC SPINE 2VWS: CPT

## 2025-05-21 NOTE — TELEPHONE ENCOUNTER
Patient phoned into office and was asking if she could have an xray just to make sure that T8 is okay.  She feels that she needs this for piece of mind prior to returning to work and starting PT.  She noted that she is having increased soreness and pain when turning and doing normal activities.      This RN stated that she would send message to provider and then return call to her with reply.      Pt appreciative for assistance.

## 2025-05-21 NOTE — TELEPHONE ENCOUNTER
"Patient phoned and updated patient that Dr. Knutson stated that \"If significant increased pain needs an XR and new MRI\"    This RN to ensure orders are placed for her. Patient appreciative for assistance.    "

## 2025-05-22 ENCOUNTER — EVALUATION (OUTPATIENT)
Dept: PHYSICAL THERAPY | Facility: MEDICAL CENTER | Age: 65
End: 2025-05-22
Attending: RADIOLOGY
Payer: COMMERCIAL

## 2025-05-22 DIAGNOSIS — M80.00XD AGE-RELATED OSTEOPOROSIS WITH CURRENT PATHOLOGICAL FRACTURE WITH ROUTINE HEALING, SUBSEQUENT ENCOUNTER: Primary | ICD-10-CM

## 2025-05-22 PROCEDURE — 97161 PT EVAL LOW COMPLEX 20 MIN: CPT | Performed by: PHYSICAL THERAPIST

## 2025-05-22 PROCEDURE — 97112 NEUROMUSCULAR REEDUCATION: CPT | Performed by: PHYSICAL THERAPIST

## 2025-05-22 NOTE — PROGRESS NOTES
"PT Evaluation     Today's date: 2025  Patient name: Lisa Canseco  : 1960  MRN: [id number]  Referring provider: Sean Barba MD  Dx:   Encounter Diagnosis     ICD-10-CM    1. Age-related osteoporosis with current pathological fracture with routine healing, subsequent encounter  M80.00XD           Start Time: 1730  Stop Time:   Total time in clinic (min): 45 minutes    Assessment  Impairments: abnormal gait, abnormal or restricted ROM, abnormal movement, activity intolerance, impaired balance, impaired physical strength, lacks appropriate home exercise program, pain with function, poor posture , participation limitations, activity limitations and endurance  Symptom irritability: moderate    Assessment details: Pt is a 64 y.o. female who presents to OP PT Hx of multiple compression fractures of lower T/s and upper L/s treated with kyphoplasty.  Pt presents today with increased mid and lower back pain (\"stiffness\"), decreased spinal movement, decreased LE strength and decreased activity tolerance. These impairments limit the patient from participating in ADLs at [facility], decreased tolerance to participating in hobbies at [facility] and decreased ability to participate in the community.  Review of precautions and contraindications were reviewed with the patient including care and avoiding forceful and repetitive flexion in order to prevent anterior load to vertebral bodies. Pt gave verbal consent of understanding. Pt completed HEP in clinic where form was corrected and ensured understanding of specific instructions. Pt was encouraged to reach out to PT if there is any questions, pain or adverse symptom production and encouraged to DC the activity.  All other questions and concerns were addressed to patient satisfaction.  I believe this patient is a good candidate for and will benefit from skilled physical therapy for ROM exercises, strengthening exercises and mechanics training to improve limitations and " assist the patient to return to PLOF.  Thank you for the opportunity to participate in Lisa Canseco's care.    Positive Prognostic Indicators: desire to improve    Negative Prognostic Indicators: chronicity of symptoms, co-morbidities       Understanding of Dx/Px/POC: good     Prognosis: good    Goals  STGs: 4 weeks  1) Pt will have SPR decrease of 2 units at rest  2) pt will have improved spinal extension to minimally limited  3) pt will have improved foto score of 10 points    LTGs: 8 weeks  1) pt will be independent with HEP by D/C  2) pt will be independent with symptom management by D/C  3)pt will subjectively report overall symptoms improvement when laying down or performing ADLs by at least 50% in order to demonstrate improved activity tolerance by DC.       Plan  Patient would benefit from: skilled physical therapy  Planned modality interventions: cryotherapy and thermotherapy: hydrocollator packs    Planned therapy interventions: joint mobilization, IASTM, manual therapy, neuromuscular re-education, patient/caregiver education, postural training, strengthening, stretching, therapeutic activities, therapeutic exercise, home exercise program, gait training, functional ROM exercises, balance/weight bearing training and balance    Frequency: 1-2x week  Duration in weeks: 12  Plan of Care beginning date: 5/22/2025  Plan of Care expiration date: 8/14/2025  Treatment plan discussed with: patient        Subjective Evaluation    History of Present Illness  Mechanism of injury: DOO:  JEROME:      Subjective Comments: pt reports that she had 5 fractured 5 vertebrae. This was back in February. Pt reported that back pain increased with spasms. She saw multiple providers for pain management. She was previously scheduled from PT but noted that she slipped and fell onto her knees where her spine fractures occurred. She sought medical care, had X-ray completed. Was referred to neurosurgical consult. MRI completed.  Pt repots  that she has had on and off aching but now is more constant. Reports increased stiffness that will improve with movement in the morning.     Pain   Rest: 4-5/10   Best: 4-5/10   Worst: 5/10    Relieving Factors: takes 2 tylenol    Exacerbating Factors: putting socks on    Sleeping:reports that increased discomfort with laying flat on back and side    Home Set-up:    ADLs: some difficulty but is doing well.     Work/Hobbies: works at     Previous Treatment:    Goals:  be less achy and stiffness.                Objective     Active Range of Motion   Cervical/Thoracic Spine       Thoracic    Flexion:  Restriction level: maximal  Extension:  Restriction level: moderate    Lumbar   Flexion:  Restriction level: moderate  Extension:  Restriction level: moderate    Strength/Myotome Testing     Left Shoulder     Planes of Motion   Flexion: 4+   Extension: 4+   Abduction: 4+   External rotation at 0°: 4+   Internal rotation at 0°: 4+     Right Shoulder     Planes of Motion   Flexion: 4+   Extension: 4+   Abduction: 4+   External rotation at 0°: 4+   Internal rotation at 0°: 4+     Left Hip   Planes of Motion   Flexion: 4+  Abduction: 4+  Adduction: 5    Right Hip   Planes of Motion   Flexion: 4+  Abduction: 4+  Adduction: 5    Left Knee   Flexion: 4+  Extension: 5    Right Knee   Flexion: 4+  Extension: 5    Left Ankle/Foot   Dorsiflexion: 5  Plantar flexion: 5    Right Ankle/Foot   Dorsiflexion: 5  Plantar flexion: 5    Ambulation     Ambulation: Level Surfaces   Ambulation without assistive device: independent    Ambulation: Stairs   Ascend stairs: independent  Pattern: reciprocal  Railings: two rails  Descend stairs: independent  Pattern: reciprocal  Railings: two rails    Observational Gait   Gait: asymmetric   Decreased walking speed and stride length.              Precautions:   Problem List[1]   Past Medical History[2]  Past Surgical History[3]  Eval/Re-Eval POC Expires Auth #/ Referral # Total Visits Start  "Date Expiration Date Extension Info Visits Limitation                                                                 1 2 3 4 5 6   5/22        7 8 9 10 11 12           13 14 15 16 17 18           19 20 21 22 23 24           25 26 27 28 29 30             Access Code: [email or web address]  URL: [email or web address]  Date: 05/22/2025  Prepared by: William Moon    Exercises  - Supine Posterior Pelvic Tilt  - 1-2 x daily - 1-2 sets - 10 reps - 5 hold  - Hooklying Gluteal Sets  - 1-2 x daily - 1-2 sets - 10 reps - 3-5 hold  - Supine Hip Adduction Isometric with Ball  - 1-2 x daily - 1-2 sets - 10 reps - 5 hold    Manuals 5/22                                                                Neuro Re-Ed             PPT 5\" x10            TAB+ hip add iso 5\" x10            TAB+ glute set 5\" x10            TAB+ hip abd             TAB+ marching                                       Ther Ex             LTR             Side stepping             Step ups             Mini squats                                                                 Ther Activity                                       Gait Training                                       Modalities                                                   [1]   Patient Active Problem List  Diagnosis    Anxiety    Hypothyroidism    Osteoporosis    Cervical disc disorder with radiculopathy of mid-cervical region    Closed fracture of left foot, initial encounter    Compression fracture of T12 vertebra, initial encounter ([facility])    Lumbar radiculopathy   [2]   Past Medical History:  Diagnosis Date    Anxiety 2000    Many years,ocd,meds started year 2000    Cataract, right     Closed fracture of right distal fibula 3/22/2022    Detached retina, right     Disease of thyroid gland     OCD (obsessive compulsive disorder)     Seasonal allergies     Wrist fracture    [3]   Past Surgical History:  Procedure Laterality Date    CATARACT EXTRACTION      EYE SURGERY      FOOT SURGERY      IR " KYPHOPLASTY/VERTEBROPLASTY  4/9/2025    MANDIBLE FRACTURE SURGERY      TONSILLECTOMY

## 2025-05-29 ENCOUNTER — OFFICE VISIT (OUTPATIENT)
Dept: PHYSICAL THERAPY | Facility: MEDICAL CENTER | Age: 65
End: 2025-05-29
Attending: RADIOLOGY
Payer: COMMERCIAL

## 2025-05-29 DIAGNOSIS — M80.00XD AGE-RELATED OSTEOPOROSIS WITH CURRENT PATHOLOGICAL FRACTURE WITH ROUTINE HEALING, SUBSEQUENT ENCOUNTER: Primary | ICD-10-CM

## 2025-05-29 PROCEDURE — 97110 THERAPEUTIC EXERCISES: CPT | Performed by: PHYSICAL THERAPIST

## 2025-05-29 PROCEDURE — 97112 NEUROMUSCULAR REEDUCATION: CPT | Performed by: PHYSICAL THERAPIST

## 2025-05-29 NOTE — PROGRESS NOTES
Daily Note     Today's date: 2025  Patient name: Lisa Canseco  : 1960  MRN: [id number]  Referring provider: Sean Barba MD  Dx:   Encounter Diagnosis     ICD-10-CM    1. Age-related osteoporosis with current pathological fracture with routine healing, subsequent encounter  M80.00XD           Start Time: 1150  Stop Time: 1230  Total time in clinic (min): 40 minutes    Subjective: pt reports that she is doing well. Reports that she has periods of increased pain when laying on side. She has received results from X-ray and has MRI scheduled.       Objective: See treatment diary below      Assessment: Tolerated treatment well. Progressed LE and postural strengthening this session with good tolerance. HEP progressed. Pt encouraged to DC exercise if there is increased pain. Patient would benefit from continued PT      Plan: Continue per plan of care.      Precautions:   Problem List[1]   Past Medical History[2]  Past Surgical History[3]  Eval/Re-Eval POC Expires Auth #/ Referral # Total Visits Start Date Expiration Date Extension Info Visits Limitation                                                                 1 2 3 4 5 6          7 8 9 10 11 12           13 14 15 16 17 18           19 20 21 22 23 24           25 26 27 28 29 30             Access Code: [email or web address]  URL: [email or web address]  Date: 2025  Prepared by: William Moon    Exercises  - Supine Posterior Pelvic Tilt  - 1-2 x daily - 1-2 sets - 10 reps - 5 hold  - Hooklying Gluteal Sets  - 1-2 x daily - 1-2 sets - 10 reps - 3-5 hold  - Supine Hip Adduction Isometric with Ball  - 1-2 x daily - 1-2 sets - 10 reps - 5 hold  - Seated Scapular Retraction  - 10 x daily - 1-2 sets - 10 reps - 5 hold  - Standing Hip Abduction with Counter Support  - 1-2 x daily - 1-2 sets - 10 reps  - Standing Hip Extension with Counter Support  - 1-2 x daily - 1-2 sets - 10 reps    Manuals                                           "                      Neuro Re-Ed             PPT 5\" x10 5\"x10           TAB+ hip add iso 5\" x10 5\" x10           TAB+ glute set 5\" x10 5\" x10 mini bridge           TAB+ hip abd  Rtb x10           TAB+ marching  X10 ea.           Scap retract   5\" x10                        Ther Ex             LTR  X10 ea.           Side stepping  Rtb 3 laps           Step ups  6\" BHR x10 ea. Fwd and lat           Mini squats  2x10           bike  4'           HR  x20           Standing hip abd  X20 ea. alt                        Ther Activity                                       Gait Training                                       Modalities                                                          [1]   Patient Active Problem List  Diagnosis    Anxiety    Hypothyroidism    Osteoporosis    Cervical disc disorder with radiculopathy of mid-cervical region    Closed fracture of left foot, initial encounter    Compression fracture of T12 vertebra, initial encounter ([facility])    Lumbar radiculopathy   [2]   Past Medical History:  Diagnosis Date    Anxiety 2000    Many years,ocd,meds started year 2000    Cataract, right     Closed fracture of right distal fibula 3/22/2022    Detached retina, right     Disease of thyroid gland     OCD (obsessive compulsive disorder)     Seasonal allergies     Wrist fracture    [3]   Past Surgical History:  Procedure Laterality Date    CATARACT EXTRACTION      EYE SURGERY      FOOT SURGERY      IR KYPHOPLASTY/VERTEBROPLASTY  4/9/2025    MANDIBLE FRACTURE SURGERY      TONSILLECTOMY       "

## 2025-05-30 DIAGNOSIS — F32.A DEPRESSION, UNSPECIFIED DEPRESSION TYPE: ICD-10-CM

## 2025-05-31 RX ORDER — SERTRALINE HYDROCHLORIDE 100 MG/1
100 TABLET, FILM COATED ORAL DAILY
Qty: 90 TABLET | Refills: 0 | Status: SHIPPED | OUTPATIENT
Start: 2025-05-31

## 2025-06-02 DIAGNOSIS — E03.9 HYPOTHYROIDISM, UNSPECIFIED TYPE: ICD-10-CM

## 2025-06-02 RX ORDER — LEVOTHYROXINE SODIUM 150 MCG
150 TABLET ORAL DAILY
Qty: 90 TABLET | Refills: 3 | Status: SHIPPED | OUTPATIENT
Start: 2025-06-02 | End: 2025-07-02

## 2025-06-03 ENCOUNTER — OFFICE VISIT (OUTPATIENT)
Dept: PHYSICAL THERAPY | Facility: MEDICAL CENTER | Age: 65
End: 2025-06-03
Attending: RADIOLOGY
Payer: COMMERCIAL

## 2025-06-03 DIAGNOSIS — M80.00XD AGE-RELATED OSTEOPOROSIS WITH CURRENT PATHOLOGICAL FRACTURE WITH ROUTINE HEALING, SUBSEQUENT ENCOUNTER: Primary | ICD-10-CM

## 2025-06-03 PROCEDURE — 97112 NEUROMUSCULAR REEDUCATION: CPT | Performed by: PHYSICAL THERAPIST

## 2025-06-03 PROCEDURE — 97110 THERAPEUTIC EXERCISES: CPT | Performed by: PHYSICAL THERAPIST

## 2025-06-03 NOTE — PROGRESS NOTES
Daily Note     Today's date: 6/3/2025  Patient name: Lisa Canseco  : 1960  MRN: [id number]  Referring provider: Sean Barba MD  Dx:   Encounter Diagnosis     ICD-10-CM    1. Age-related osteoporosis with current pathological fracture with routine healing, subsequent encounter  M80.00XD           Start Time: 0804  Stop Time: 0849  Total time in clinic (min): 45 minutes    Subjective: pt reports that she has good days and bad days. Reports that she had a day where she had increased pain for no explainable reason. She decided to push thorough it and noted increased pain even more at night. This resolved by morning and had a good day following.       Objective: See treatment diary below      Assessment: Tolerated treatment well. Continued all prescribed exercises with good tolerance with additional sets.  Progressed postural strengthening with good tolerance. No pain reported throughout session. We will continue to monitor and progress as tolerated.  Patient would benefit from continued PT      Plan: Continue per plan of care.      Precautions:   Problem List[1]   Past Medical History[2]  Past Surgical History[3]  Eval/Re-Eval POC Expires Auth #/ Referral # Total Visits Start Date Expiration Date Extension Info Visits Limitation                                                                 1 2 3 4 5 6   5/22 5/29 6/3      7 8 9 10 11 12           13 14 15 16 17 18           19 20 21 22 23 24           25 26 27 28 29 30             Access Code: [email or web address]  URL: [email or web address]  Date: 2025  Prepared by: William Moon    Exercises  - Supine Posterior Pelvic Tilt  - 1-2 x daily - 1-2 sets - 10 reps - 5 hold  - Hooklying Gluteal Sets  - 1-2 x daily - 1-2 sets - 10 reps - 3-5 hold  - Supine Hip Adduction Isometric with Ball  - 1-2 x daily - 1-2 sets - 10 reps - 5 hold  - Seated Scapular Retraction  - 10 x daily - 1-2 sets - 10 reps - 5 hold  - Standing Hip Abduction with Counter  "Support  - 1-2 x daily - 1-2 sets - 10 reps  - Standing Hip Extension with Counter Support  - 1-2 x daily - 1-2 sets - 10 reps    Manuals 5/22 5/29 6/3                                                              Neuro Re-Ed             PPT 5\" x10 5\"x10 5\"x10          TAB+ hip add iso 5\" x10 5\" x10 5\" x20          TAB+ glute set 5\" x10 5\" x10 mini bridge 5\" 2x10 mini bridge          TAB+ hip abd  Rtb x10 Gtb x20          TAB+ marching  X10 ea. X20 ea.          Scap retract   5\" x10           Band rows   Rtb 2x10          Band ext   Rtb 2x10                                    Ther Ex             LTR  X10 ea. X10 ea.          Side stepping  Rtb 3 laps 5 laps          Step ups  6\" BHR x10 ea. Fwd and lat 6\" BHR x20 ea. Fwd and lat          Mini squats  2x10 2x10          bike  4'           HR  x20 x20          Standing hip abd  X20 ea. alt X20 ea. alt          Standing hip ext   X20 ea. alt                                    Ther Activity                                       Gait Training                                       Modalities                                                               [1]   Patient Active Problem List  Diagnosis    Anxiety    Hypothyroidism    Osteoporosis    Cervical disc disorder with radiculopathy of mid-cervical region    Closed fracture of left foot, initial encounter    Compression fracture of T12 vertebra, initial encounter ([facility])    Lumbar radiculopathy   [2]   Past Medical History:  Diagnosis Date    Anxiety 2000    Many years,ocd,meds started year 2000    Cataract, right     Closed fracture of right distal fibula 3/22/2022    Detached retina, right     Disease of thyroid gland     OCD (obsessive compulsive disorder)     Seasonal allergies     Wrist fracture    [3]   Past Surgical History:  Procedure Laterality Date    CATARACT EXTRACTION      EYE SURGERY      FOOT SURGERY      IR KYPHOPLASTY/VERTEBROPLASTY  4/9/2025    MANDIBLE FRACTURE SURGERY      TONSILLECTOMY       "

## 2025-06-10 ENCOUNTER — TELEPHONE (OUTPATIENT)
Dept: NEUROSURGERY | Facility: CLINIC | Age: 65
End: 2025-06-10

## 2025-06-10 ENCOUNTER — OFFICE VISIT (OUTPATIENT)
Dept: PHYSICAL THERAPY | Facility: MEDICAL CENTER | Age: 65
End: 2025-06-10
Attending: RADIOLOGY
Payer: COMMERCIAL

## 2025-06-10 DIAGNOSIS — M80.00XD AGE-RELATED OSTEOPOROSIS WITH CURRENT PATHOLOGICAL FRACTURE WITH ROUTINE HEALING, SUBSEQUENT ENCOUNTER: Primary | ICD-10-CM

## 2025-06-10 PROCEDURE — 97112 NEUROMUSCULAR REEDUCATION: CPT | Performed by: PHYSICAL THERAPIST

## 2025-06-10 PROCEDURE — 97110 THERAPEUTIC EXERCISES: CPT | Performed by: PHYSICAL THERAPIST

## 2025-06-10 NOTE — PROGRESS NOTES
Daily Note     Today's date: 6/10/2025  Patient name: Lisa Canseco  : 1960  MRN: [id number]  Referring provider: Sean Barba MD  Dx:   Encounter Diagnosis     ICD-10-CM    1. Age-related osteoporosis with current pathological fracture with routine healing, subsequent encounter  M80.00XD           Start Time: 0758  Stop Time: 0850  Total time in clinic (min): 52 minutes    Subjective: pt reports that mornings are better. Reports that she continues to have rib pain when laying down. She has her MRI coming up.       Objective: See treatment diary below      Assessment: Tolerated treatment well. Completed all exercises with good tolerance. No pain reported. Appropriate amounts of soreness. Requires increased VC for postural correction with bad rows and ext with good carry over. HEP progressed as noted below. We will continue to monitor and progress as tolerated.  Patient would benefit from continued PT      Plan: Continue per plan of care.      Precautions:   Problem List[1]   Past Medical History[2]  Past Surgical History[3]  Eval/Re-Eval POC Expires Auth #/ Referral # Total Visits Start Date Expiration Date Extension Info Visits Limitation                                                                 1 2 3 4 5 6   5/22 5/29 6/3 6/10 foto    7 8 9 10 11 12           13 14 15 16 17 18           19 20 21 22 23 24           25 26 27 28 29 30             Access Code: [email or web address]  URL: [email or web address]  Date: 06/10/2025  Prepared by: William Moon    Exercises  - Supine Posterior Pelvic Tilt  - 1-2 x daily - 1-2 sets - 10 reps - 5 hold  - Hooklying Gluteal Sets  - 1-2 x daily - 1-2 sets - 10 reps - 3-5 hold  - Supine Hip Adduction Isometric with Ball  - 1-2 x daily - 1-2 sets - 10 reps - 5 hold  - Seated Scapular Retraction  - 10 x daily - 1-2 sets - 10 reps - 5 hold  - Standing Hip Abduction with Counter Support  - 1-2 x daily - 1-2 sets - 10 reps  - Standing Hip Extension with Counter  "Support  - 1-2 x daily - 1-2 sets - 10 reps  - Standing Shoulder Row with Anchored Resistance  - 1-2 x daily - 1-2 sets - 10 reps - 2 hold  - Shoulder extension with resistance - Neutral  - 1-2 x daily - 1-2 sets - 10 reps - 2 hold    Manuals 5/22 5/29 6/3 6/10                                                             Neuro Re-Ed             PPT 5\" x10 5\"x10 5\"x10 5\" x20         TAB+ hip add iso 5\" x10 5\" x10 5\" x20 5\" x20         TAB+ glute set 5\" x10 5\" x10 mini bridge 5\" 2x10 mini bridge 5\" 2x10 mini bridge         TAB+ hip abd  Rtb x10 Gtb x20 Gtb 2x10         TAB+ marching  X10 ea. X20 ea. X20 ea.         Scap retract   5\" x10           Band rows   Rtb 2x10 Rtb 2x10         Band ext   Rtb 2x10 Rtb 2x10                                   Ther Ex             LTR  X10 ea. X10 ea. X10 ea.         Side stepping  Rtb 3 laps 5 laps 5 laps rtb         Step ups  6\" BHR x10 ea. Fwd and lat 6\" BHR x20 ea. Fwd and lat 6\" fwd and lateral x20 ea. B/l         Mini squats  2x10 2x10 2x10          bike  4'           HR  x20 x20          Standing hip abd  X20 ea. alt X20 ea. alt X20 ea. alt         Standing hip ext   X20 ea. alt X20 ea. alt         Monster walks    Rtb 5 laps                      Ther Activity                                       Gait Training                                       Modalities                                                                [1]   Patient Active Problem List  Diagnosis    Anxiety    Hypothyroidism    Osteoporosis    Cervical disc disorder with radiculopathy of mid-cervical region    Closed fracture of left foot, initial encounter    Compression fracture of T12 vertebra, initial encounter ([facility])    Lumbar radiculopathy   [2]   Past Medical History:  Diagnosis Date    Anxiety 2000    Many years,ocd,meds started year 2000    Cataract, right     Closed fracture of right distal fibula 3/22/2022    Detached retina, right     Disease of thyroid gland     OCD (obsessive compulsive disorder) "     Seasonal allergies     Wrist fracture    [3]   Past Surgical History:  Procedure Laterality Date    CATARACT EXTRACTION      EYE SURGERY      FOOT SURGERY      IR KYPHOPLASTY/VERTEBROPLASTY  4/9/2025    MANDIBLE FRACTURE SURGERY      TONSILLECTOMY

## 2025-06-12 ENCOUNTER — OFFICE VISIT (OUTPATIENT)
Dept: PHYSICAL THERAPY | Facility: MEDICAL CENTER | Age: 65
End: 2025-06-12
Attending: RADIOLOGY
Payer: COMMERCIAL

## 2025-06-12 DIAGNOSIS — M80.00XD AGE-RELATED OSTEOPOROSIS WITH CURRENT PATHOLOGICAL FRACTURE WITH ROUTINE HEALING, SUBSEQUENT ENCOUNTER: Primary | ICD-10-CM

## 2025-06-12 PROCEDURE — 97110 THERAPEUTIC EXERCISES: CPT | Performed by: PHYSICAL THERAPIST

## 2025-06-12 PROCEDURE — 97112 NEUROMUSCULAR REEDUCATION: CPT | Performed by: PHYSICAL THERAPIST

## 2025-06-12 NOTE — PROGRESS NOTES
Daily Note     Today's date: 2025  Patient name: Lisa Canseco  : 1960  MRN: [id number]  Referring provider: Sean Barba MD  Dx:   Encounter Diagnosis     ICD-10-CM    1. Age-related osteoporosis with current pathological fracture with routine healing, subsequent encounter  M80.00XD           Start Time: 0805  Stop Time: 0845  Total time in clinic (min): 40 minutes    Subjective: pt reports that she is doing well. Reports that she was using a light weight weed whacker yesterday without pain but notes soreness this morning.       Objective: See treatment diary below      Assessment: Tolerated treatment well. Pt continued core and LE strengthening exercises well with good tolerance and performance. Pt was cautioned with activity levels and asked to DC activity if there is increased pain. Patient would benefit from continued PT      Plan: Continue per plan of care.      Precautions:   Problem List[1]   Past Medical History[2]  Past Surgical History[3]  Eval/Re-Eval POC Expires Auth #/ Referral # Total Visits Start Date Expiration Date Extension Info Visits Limitation                                                                 1 2 3 4 5 6   5/22 5/29 6/3 6/10 612 FOTO    7 8 9 10 11 12           13 14 15 16 17 18           19 20 21 22 23 24           25 26 27 28 29 30             Access Code: [email or web address]  URL: [email or web address]  Date: 06/10/2025  Prepared by: William Moon    Exercises  - Supine Posterior Pelvic Tilt  - 1-2 x daily - 1-2 sets - 10 reps - 5 hold  - Hooklying Gluteal Sets  - 1-2 x daily - 1-2 sets - 10 reps - 3-5 hold  - Supine Hip Adduction Isometric with Ball  - 1-2 x daily - 1-2 sets - 10 reps - 5 hold  - Seated Scapular Retraction  - 10 x daily - 1-2 sets - 10 reps - 5 hold  - Standing Hip Abduction with Counter Support  - 1-2 x daily - 1-2 sets - 10 reps  - Standing Hip Extension with Counter Support  - 1-2 x daily - 1-2 sets - 10 reps  - Standing Shoulder Row  "with Anchored Resistance  - 1-2 x daily - 1-2 sets - 10 reps - 2 hold  - Shoulder extension with resistance - Neutral  - 1-2 x daily - 1-2 sets - 10 reps - 2 hold    Manuals 5/22 5/29 6/3 6/10 6/12  FOTO                                                            Neuro Re-Ed             PPT 5\" x10 5\"x10 5\"x10 5\" x20 5\" x20        TAB+ hip add iso 5\" x10 5\" x10 5\" x20 5\" x20 5\" x20        TAB+ glute set 5\" x10 5\" x10 mini bridge 5\" 2x10 mini bridge 5\" 2x10 mini bridge 5\" 2x10 mini bridge        TAB+ hip abd  Rtb x10 Gtb x20 Gtb 2x10 Gtb 2x10        TAB+ marching  X10 ea. X20 ea. X20 ea. SLR 2x10 ea.        Scap retract   5\" x10           Band rows   Rtb 2x10 Rtb 2x10         Band ext   Rtb 2x10 Rtb 2x10                                   Ther Ex             LTR  X10 ea. X10 ea. X10 ea.         Side stepping  Rtb 3 laps 5 laps 5 laps rtb         Step ups  6\" BHR x10 ea. Fwd and lat 6\" BHR x20 ea. Fwd and lat 6\" fwd and lateral x20 ea. B/l 8\" fwd and lateral x20 ea. B/l        Mini squats  2x10 2x10 2x10  2x10        bike  4'           HR  x20 x20          Standing hip abd  X20 ea. alt X20 ea. alt X20 ea. alt X20 ea. alt        Standing hip ext   X20 ea. alt X20 ea. alt X20 ea. alt        Monster walks    Rtb 5 laps                      Ther Activity                                       Gait Training                                       Modalities                                                                     [1]   Patient Active Problem List  Diagnosis    Anxiety    Hypothyroidism    Osteoporosis    Cervical disc disorder with radiculopathy of mid-cervical region    Closed fracture of left foot, initial encounter    Compression fracture of T12 vertebra, initial encounter ([facility])    Lumbar radiculopathy   [2]   Past Medical History:  Diagnosis Date    Anxiety 2000    Many years,ocd,meds started year 2000    Cataract, right     Closed fracture of right distal fibula 3/22/2022    Detached retina, right     Disease of " thyroid gland     OCD (obsessive compulsive disorder)     Seasonal allergies     Wrist fracture    [3]   Past Surgical History:  Procedure Laterality Date    CATARACT EXTRACTION      EYE SURGERY      FOOT SURGERY      IR KYPHOPLASTY/VERTEBROPLASTY  4/9/2025    MANDIBLE FRACTURE SURGERY      TONSILLECTOMY

## 2025-06-16 ENCOUNTER — OFFICE VISIT (OUTPATIENT)
Dept: PODIATRY | Facility: CLINIC | Age: 65
End: 2025-06-16
Payer: COMMERCIAL

## 2025-06-16 ENCOUNTER — HOSPITAL ENCOUNTER (OUTPATIENT)
Dept: RADIOLOGY | Facility: HOSPITAL | Age: 65
Discharge: HOME | End: 2025-06-16
Attending: PODIATRIST
Payer: COMMERCIAL

## 2025-06-16 VITALS — HEIGHT: 63 IN | OXYGEN SATURATION: 95 % | BODY MASS INDEX: 24.8 KG/M2 | WEIGHT: 140 LBS | HEART RATE: 73 BPM

## 2025-06-16 DIAGNOSIS — M21.612 BUNION OF LEFT FOOT: ICD-10-CM

## 2025-06-16 DIAGNOSIS — S92.902A CLOSED FRACTURE OF LEFT FOOT, INITIAL ENCOUNTER: ICD-10-CM

## 2025-06-16 DIAGNOSIS — M20.5X2 HALLUX LIMITUS OF LEFT FOOT: Primary | ICD-10-CM

## 2025-06-16 PROCEDURE — 73630 X-RAY EXAM OF FOOT: CPT

## 2025-06-16 PROCEDURE — 99213 OFFICE O/P EST LOW 20 MIN: CPT | Performed by: PODIATRIST

## 2025-06-17 NOTE — PROGRESS NOTES
:  Assessment & Plan  Closed fracture of left foot, initial encounter    Orders:    XR foot 3+ vw left; Future    Bunion of left foot         Hallux limitus of left foot         The patient's clinical examination today is significant for a mildly tender bunion deformity of the left first metatarsal phalangeal joint.  There is osseous spurring at the joint space with reduced dorsiflexory range of motion.  Her tenderness can be localized to the osseous spurring and dorsal medial bony prominence.  There is no tenderness at the site of her prior fracture at the base of the proximal phalanx.    Repeat x-rays of the patient's left foot taken today were personally reviewed and interpreted.  There is a stable mildly displaced fracture fragment at the base of the proximal phalanx of the left great toe.  Alignment is essentially unchanged.  There has been some slight resorption of the osseous fragment.    The patient is doing well.  I reassured her that her tenderness is more related to the bunion deformity than the prior fracture site.  X-rays images were reviewed with her in detail.  We can consider bunionectomy or cheilectomy if her left foot bunion should become more consistently painful or severe in nature.  The interim, continue stiff soled accommodative shoe gear with roomy toe boxes and soft upper materials.    She can follow-up with me on an as-needed basis.    History of Present Illness     Lisa Canseco is a 64 y.o. female   The patient presents today with a chief complaint of tenderness at her left great toe joint.  She is unsure if it is bunion pain or if it is related to her prior fracture at the base of her left great toe.  There has been no history of any interval injury or trauma.  She does note a prior history of a painful right foot bunion which did require surgical intervention.  The foot has since been doing well.      PAST MEDICAL HISTORY:  Past Medical History[1]    PAST SURGICAL HISTORY:  Past  "Surgical History[2]     ALLERGIES:  Patient has no known allergies.    MEDICATIONS:  Current Medications[3]    SOCIAL HISTORY:  Social History[4]   Review of Systems   Constitutional: Negative.    Respiratory: Negative.     Cardiovascular: Negative.    Skin: Negative.    Psychiatric/Behavioral: Negative.       Objective   Pulse 73   Ht 5' 3\" (1.6 m)   Wt 63.5 kg (140 lb)   SpO2 95%   BMI 24.80 kg/m²      Physical Exam  Vitals and nursing note reviewed.   Constitutional:       General: She is not in acute distress.     Appearance: She is well-developed.   HENT:      Head: Normocephalic and atraumatic.     Cardiovascular:      Pulses:           Dorsalis pedis pulses are 2+ on the left side.        Posterior tibial pulses are 2+ on the left side.   Pulmonary:      Effort: Pulmonary effort is normal.     Musculoskeletal:      Left foot: Decreased range of motion. Bunion present.        Feet:    Feet:      Left foot:      Skin integrity: Skin integrity normal.      Comments: The patient's clinical examination today is significant for a mildly tender bunion deformity of the left first metatarsal phalangeal joint.  There is osseous spurring at the joint space with reduced dorsiflexory range of motion.  Her tenderness can be localized to the osseous spurring and dorsal medial bony prominence.  There is no tenderness at the site of her prior fracture at the base of the proximal phalanx.    Skin:     General: Skin is warm.      Capillary Refill: Capillary refill takes less than 2 seconds.     Neurological:      General: No focal deficit present.      Mental Status: She is alert and oriented to person, place, and time.     Psychiatric:         Mood and Affect: Mood normal.                [1]   Past Medical History:  Diagnosis Date    Anxiety 2000    Many years,ocd,meds started year 2000    Cataract, right     Closed fracture of right distal fibula 3/22/2022    Detached retina, right     Disease of thyroid gland     OCD " (obsessive compulsive disorder)     Seasonal allergies     Wrist fracture    [2]   Past Surgical History:  Procedure Laterality Date    CATARACT EXTRACTION      EYE SURGERY      FOOT SURGERY      IR KYPHOPLASTY/VERTEBROPLASTY  4/9/2025    MANDIBLE FRACTURE SURGERY      TONSILLECTOMY     [3]   Current Outpatient Medications   Medication Sig Dispense Refill    Denosumab (PROLIA SC) Inject under the skin      sertraline (ZOLOFT) 100 mg tablet TAKE ONE TABLET BY MOUTH EVERY DAY 90 tablet 0    Synthroid 150 MCG tablet Take 1 tablet (150 mcg total) by mouth daily 90 tablet 3    VITAMIN D, CHOLECALCIFEROL, PO Take by mouth      valACYclovir (VALTREX) 1,000 mg tablet Take 1 tablet (1,000 mg total) by mouth 2 (two) times a day as needed (cold sores) 60 tablet 1     No current facility-administered medications for this visit.   [4]   Social History  Socioeconomic History    Marital status: /Civil Union   Tobacco Use    Smoking status: Never     Passive exposure: Past    Smokeless tobacco: Never   Vaping Use    Vaping status: Never Used   Substance and Sexual Activity    Alcohol use: Yes     Alcohol/week: 2.0 standard drinks of alcohol     Types: 2 Cans of beer per week     Comment: social drinking, not often    Drug use: Never    Sexual activity: Never   Social History Narrative    Daily coffee    Denied cola    Denied tea

## 2025-06-18 ENCOUNTER — HOSPITAL ENCOUNTER (OUTPATIENT)
Dept: RADIOLOGY | Age: 65
Discharge: HOME | End: 2025-06-18
Attending: RADIOLOGY
Payer: COMMERCIAL

## 2025-06-18 DIAGNOSIS — S22.000A THORACIC COMPRESSION FRACTURE (HCC): ICD-10-CM

## 2025-06-18 PROCEDURE — 72146 MRI CHEST SPINE W/O DYE: CPT

## 2025-06-24 ENCOUNTER — OFFICE VISIT (OUTPATIENT)
Dept: PHYSICAL THERAPY | Facility: MEDICAL CENTER | Age: 65
End: 2025-06-24
Attending: RADIOLOGY
Payer: COMMERCIAL

## 2025-06-24 DIAGNOSIS — M80.00XD AGE-RELATED OSTEOPOROSIS WITH CURRENT PATHOLOGICAL FRACTURE WITH ROUTINE HEALING, SUBSEQUENT ENCOUNTER: Primary | ICD-10-CM

## 2025-06-24 PROCEDURE — 97110 THERAPEUTIC EXERCISES: CPT | Performed by: PHYSICAL THERAPIST

## 2025-06-24 PROCEDURE — 97112 NEUROMUSCULAR REEDUCATION: CPT | Performed by: PHYSICAL THERAPIST

## 2025-06-24 NOTE — PROGRESS NOTES
PT Re-Evaluation     Today's date: 2025  Patient name: Lisa Canseco  : 1960  MRN: [id number]  Referring provider: Sean Barba MD  Dx:   Encounter Diagnosis     ICD-10-CM    1. Age-related osteoporosis with current pathological fracture with routine healing, subsequent encounter  M80.00XD           Start Time: 1725  Stop Time: 1808  Total time in clinic (min): 43 minutes    Assessment  Impairments: abnormal or restricted ROM, abnormal movement, activity intolerance, impaired balance, impaired physical strength, lacks appropriate home exercise program, pain with function, poor posture , participation limitations, activity limitations and endurance  Symptom irritability: moderate    Assessment details: Pt is a 64 y.o. female who presents to OP PT Hx of multiple compression fractures of lower T/s and upper L/s treated with kyphoplasty.  Pt has completed 6 PT sessions to this date.  The patient has made improvements in decreased SPR at best, improved LE strength and improved activity tolerance.  However, the patient continues to have increased difficulty with periods of increased pain usually following prolonged activity and limited functional ability as noted with confidence with lifting and carrying.  I believe this patient will continue to benefit from skilled PT for continued core and extremity strengthening and continued mechanics  training in order to maximize rehabilitative efforts and assist the patient to improved QOL.          Understanding of Dx/Px/POC: good     Prognosis: good    Goals  STGs: 4 weeks  1) Pt will have SPR decrease of 2 units at rest- not met  2) pt will have improved spinal extension to minimally limited- met  3) pt will have improved foto score of 10 points- part met    LTGs: 8 weeks  1) pt will be independent with HEP by D/C- met  2) pt will be independent with symptom management by D/C- not met  3)pt will subjectively report overall symptoms improvement when laying down  "or performing ADLs by at least 50% in order to demonstrate improved activity tolerance by DC.- not met       Plan  Patient would benefit from: skilled physical therapy  Planned modality interventions: cryotherapy and thermotherapy: hydrocollator packs    Planned therapy interventions: joint mobilization, IASTM, manual therapy, neuromuscular re-education, patient/caregiver education, postural training, strengthening, stretching, therapeutic activities, therapeutic exercise, home exercise program, gait training, functional ROM exercises, balance/weight bearing training and balance    Frequency: 1-2x week  Duration in weeks: 12  Plan of Care beginning date: 5/22/2025  Plan of Care expiration date: 8/14/2025  Treatment plan discussed with: patient        Subjective Evaluation    History of Present Illness  Mechanism of injury: DOO:  JEROME:      Subjective Comments: pt reports that she is doing well. Reports that she continues to have flank discomfort. Symptoms are different day to day. Reports that walking is good but pays for it at night. Reports that the flank pain is more in the lateral sides not wrapping along the ribs. This is BL. L worse than R. Sleeping \"is the worse\". Laying on her sides will bother her and certain movements. Pt has returned to work and notes that she is doing well. Does not have a lot of pain.     Pain   Rest: 6-7/10   Best: 3-4/10   Worst: 6-7/10             Objective     Active Range of Motion   Cervical/Thoracic Spine       Thoracic    Flexion:  Restriction level: maximal  Extension:  Restriction level: moderate    Lumbar   Flexion:  WFL  Extension:  with pain Restriction level: minimal    Strength/Myotome Testing     Left Shoulder     Planes of Motion   Flexion: 4+   Extension: 4+   Abduction: 4+   External rotation at 0°: 4+   Internal rotation at 0°: 4+     Right Shoulder     Planes of Motion   Flexion: 4+   Extension: 4+   Abduction: 4+   External rotation at 0°: 4+   Internal rotation at " 0°: 4+     Left Hip   Planes of Motion   Flexion: 5  Abduction: 4+  Adduction: 5    Right Hip   Planes of Motion   Flexion: 4+  Abduction: 4+  Adduction: 5    Left Knee   Flexion: 5  Extension: 5    Right Knee   Flexion: 5  Extension: 5    Left Ankle/Foot   Dorsiflexion: 5  Plantar flexion: 5    Right Ankle/Foot   Dorsiflexion: 5  Plantar flexion: 5    Ambulation     Ambulation: Level Surfaces   Ambulation without assistive device: independent    Ambulation: Stairs   Ascend stairs: independent  Pattern: reciprocal  Railings: two rails  Descend stairs: independent  Pattern: reciprocal  Railings: two rails    Observational Gait   Gait: asymmetric   Decreased walking speed and stride length.              Precautions:   [Problem List]     [Problem List]  Patient Active Problem List  Diagnosis    Anxiety    Hypothyroidism    Osteoporosis    Cervical disc disorder with radiculopathy of mid-cervical region    Closed fracture of left foot, initial encounter    Compression fracture of T12 vertebra, initial encounter ([facility])    Lumbar radiculopathy     [Past Medical History]    [Past Medical History]  Diagnosis Date    Anxiety 2000    Many years,ocd,meds started year 2000    Cataract, right     Closed fracture of right distal fibula 3/22/2022    Detached retina, right     Disease of thyroid gland     OCD (obsessive compulsive disorder)     Seasonal allergies     Wrist fracture      [Past Surgical History]    [Past Surgical History]  Procedure Laterality Date    CATARACT EXTRACTION      EYE SURGERY      FOOT SURGERY      IR KYPHOPLASTY/VERTEBROPLASTY  4/9/2025    MANDIBLE FRACTURE SURGERY      TONSILLECTOMY       Eval/Re-Eval POC Expires Auth #/ Referral # Total Visits Start Date Expiration Date Extension Info Visits Limitation                                                                 1 2 3 4 5 6   5/22 5/29 6/3 6/10 612 FOTO 6/24 RE   7 8 9 10 11 12           13 14 15 16 17 18           19 20 21 22 23 24           25 26  "27 28 29 30             Access Code: [email or web address]  URL: [email or web address]  Date: 06/10/2025  Prepared by: William Moon    Exercises  - Supine Posterior Pelvic Tilt  - 1-2 x daily - 1-2 sets - 10 reps - 5 hold  - Hooklying Gluteal Sets  - 1-2 x daily - 1-2 sets - 10 reps - 3-5 hold  - Supine Hip Adduction Isometric with Ball  - 1-2 x daily - 1-2 sets - 10 reps - 5 hold  - Seated Scapular Retraction  - 10 x daily - 1-2 sets - 10 reps - 5 hold  - Standing Hip Abduction with Counter Support  - 1-2 x daily - 1-2 sets - 10 reps  - Standing Hip Extension with Counter Support  - 1-2 x daily - 1-2 sets - 10 reps  - Standing Shoulder Row with Anchored Resistance  - 1-2 x daily - 1-2 sets - 10 reps - 2 hold  - Shoulder extension with resistance - Neutral  - 1-2 x daily - 1-2 sets - 10 reps - 2 hold    Manuals 5/22 5/29 6/3 6/10 6/12  FOTO 6/24                                                           Neuro Re-Ed             PPT 5\" x10 5\"x10 5\"x10 5\" x20 5\" x20        TAB+ hip add iso 5\" x10 5\" x10 5\" x20 5\" x20 5\" x20 5\" x20       TAB+ glute set 5\" x10 5\" x10 mini bridge 5\" 2x10 mini bridge 5\" 2x10 mini bridge 5\" 2x10 mini bridge 5\" 2x10 mini bridge       TAB+ hip abd  Rtb x10 Gtb x20 Gtb 2x10 Gtb 2x10 Gtb 2x10       TAB+ marching  X10 ea. X20 ea. X20 ea. SLR 2x10 ea. SLR 2x10 ea.       Scap retract   5\" x10           Band rows   Rtb 2x10 Rtb 2x10         Band ext   Rtb 2x10 Rtb 2x10                                   Ther Ex             LTR  X10 ea. X10 ea. X10 ea.  X10 ea.       Side stepping  Rtb 3 laps 5 laps 5 laps rtb         Step ups  6\" BHR x10 ea. Fwd and lat 6\" BHR x20 ea. Fwd and lat 6\" fwd and lateral x20 ea. B/l 8\" fwd and lateral x20 ea. B/l        Mini squats  2x10 2x10 2x10  2x10        bike  4'           HR  x20 x20          Standing hip abd  X20 ea. alt X20 ea. alt X20 ea. alt X20 ea. alt X20 ea. alt       Standing hip ext   X20 ea. alt X20 ea. alt X20 ea. alt X20 ea. alt       Monster walks    " "Rtb 5 laps         Wall slides      5\" x10 ea.                    Ther Activity                                       Gait Training                                       Modalities                                                   "

## 2025-07-01 ENCOUNTER — OFFICE VISIT (OUTPATIENT)
Dept: PHYSICAL THERAPY | Facility: MEDICAL CENTER | Age: 65
End: 2025-07-01
Attending: RADIOLOGY
Payer: COMMERCIAL

## 2025-07-01 DIAGNOSIS — M80.00XD AGE-RELATED OSTEOPOROSIS WITH CURRENT PATHOLOGICAL FRACTURE WITH ROUTINE HEALING, SUBSEQUENT ENCOUNTER: Primary | ICD-10-CM

## 2025-07-01 PROCEDURE — 97110 THERAPEUTIC EXERCISES: CPT | Performed by: PHYSICAL THERAPIST

## 2025-07-01 NOTE — PROGRESS NOTES
Daily Note     Today's date: 2025  Patient name: Lisa Canseco  : 1960  MRN: [id number]  Referring provider: Sean Barba MD  Dx:   Encounter Diagnosis     ICD-10-CM    1. Age-related osteoporosis with current pathological fracture with routine healing, subsequent encounter  M80.00XD           Start Time: 0730  Stop Time: 0815  Total time in clinic (min): 45 minutes    Subjective: pt reports that she is doing well. Reports that she usually reports that most symptoms are at the end of the day.       Objective: See treatment diary below      Assessment: Tolerated treatment well. Progressed UE strengthening with good tolerance. Noted some mid back discomfort in area of T4-5 with rows. Only one set completed this session. We will continue to monitor and progress as tolerated.  Patient would benefit from continued PT      Plan: Continue per plan of care.      Precautions:   [Problem List]     [Problem List]  Patient Active Problem List  Diagnosis    Anxiety    Hypothyroidism    Osteoporosis    Cervical disc disorder with radiculopathy of mid-cervical region    Closed fracture of left foot, initial encounter    Compression fracture of T12 vertebra, initial encounter ([facility])    Lumbar radiculopathy     [Past Medical History]    [Past Medical History]  Diagnosis Date    Anxiety     Many years,ocd,meds started year     Cataract, right     Closed fracture of right distal fibula 3/22/2022    Detached retina, right     Disease of thyroid gland     OCD (obsessive compulsive disorder)     Seasonal allergies     Wrist fracture      [Past Surgical History]    [Past Surgical History]  Procedure Laterality Date    CATARACT EXTRACTION      EYE SURGERY      FOOT SURGERY      IR KYPHOPLASTY/VERTEBROPLASTY  2025    MANDIBLE FRACTURE SURGERY      TONSILLECTOMY       Eval/Re-Eval POC Expires Auth #/ Referral # Total Visits Start Date Expiration Date Extension Info Visits Limitation                                  "                                1 2 3 4 5 6   5/22 5/29 6/3 6/10 612 FOTO 6/24 RE   7 8 9 10 11 12   7/1        13 14 15 16 17 18           19 20 21 22 23 24           25 26 27 28 29 30             Access Code: [email or web address]  URL: [email or web address]  Date: 06/10/2025  Prepared by: William Moon    Exercises  - Supine Posterior Pelvic Tilt  - 1-2 x daily - 1-2 sets - 10 reps - 5 hold  - Hooklying Gluteal Sets  - 1-2 x daily - 1-2 sets - 10 reps - 3-5 hold  - Supine Hip Adduction Isometric with Ball  - 1-2 x daily - 1-2 sets - 10 reps - 5 hold  - Seated Scapular Retraction  - 10 x daily - 1-2 sets - 10 reps - 5 hold  - Standing Hip Abduction with Counter Support  - 1-2 x daily - 1-2 sets - 10 reps  - Standing Hip Extension with Counter Support  - 1-2 x daily - 1-2 sets - 10 reps  - Standing Shoulder Row with Anchored Resistance  - 1-2 x daily - 1-2 sets - 10 reps - 2 hold  - Shoulder extension with resistance - Neutral  - 1-2 x daily - 1-2 sets - 10 reps - 2 hold    Pt 1:1 from 800-810  Manuals 5/22 5/29 6/3 6/10 6/12  FOTO 6/24 7/1                                                          Neuro Re-Ed             PPT 5\" x10 5\"x10 5\"x10 5\" x20 5\" x20        TAB+ hip add iso 5\" x10 5\" x10 5\" x20 5\" x20 5\" x20 5\" x20       TAB+ glute set 5\" x10 5\" x10 mini bridge 5\" 2x10 mini bridge 5\" 2x10 mini bridge 5\" 2x10 mini bridge 5\" 2x10 mini bridge 5\" 2x10 mini bridge      TAB+ hip abd  Rtb x10 Gtb x20 Gtb 2x10 Gtb 2x10 Gtb 2x10 Btb 2x10      TAB+ marching  X10 ea. X20 ea. X20 ea. SLR 2x10 ea. SLR 2x10 ea. SLR 2x10      Scap retract   5\" x10           Band rows   Rtb 2x10 Rtb 2x10   Rtb x10      Band ext   Rtb 2x10 Rtb 2x10   Rtb x10                                Ther Ex             LTR  X10 ea. X10 ea. X10 ea.  X10 ea. X10 ea.      Side stepping  Rtb 3 laps 5 laps 5 laps rtb         Step ups  6\" BHR x10 ea. Fwd and lat 6\" BHR x20 ea. Fwd and lat 6\" fwd and lateral x20 ea. B/l 8\" fwd and lateral x20 ea. B/l  8\" fwd " "and lateral x20 ea. B/l holding #5 DBs      Mini squats  2x10 2x10 2x10  2x10        bike  4'           HR  x20 x20          Standing hip abd  X20 ea. alt X20 ea. alt X20 ea. alt X20 ea. alt X20 ea. alt X20 ea. alt      Standing hip ext   X20 ea. alt X20 ea. alt X20 ea. alt X20 ea. alt X20 ea. alt      Monster walks    Rtb 5 laps         Wall slides      5\" x10 ea.       Band tricep ext       Rtb 2x10      Band elbow flexion       Rtb 2x10                                Ther Activity                                       Gait Training                                       Modalities                                                        "

## 2025-07-08 ENCOUNTER — OFFICE VISIT (OUTPATIENT)
Dept: PHYSICAL THERAPY | Facility: MEDICAL CENTER | Age: 65
End: 2025-07-08
Attending: RADIOLOGY
Payer: COMMERCIAL

## 2025-07-08 DIAGNOSIS — M80.00XD AGE-RELATED OSTEOPOROSIS WITH CURRENT PATHOLOGICAL FRACTURE WITH ROUTINE HEALING, SUBSEQUENT ENCOUNTER: Primary | ICD-10-CM

## 2025-07-08 PROCEDURE — 97110 THERAPEUTIC EXERCISES: CPT | Performed by: PHYSICAL THERAPIST

## 2025-07-08 PROCEDURE — 97112 NEUROMUSCULAR REEDUCATION: CPT | Performed by: PHYSICAL THERAPIST

## 2025-07-08 NOTE — PROGRESS NOTES
Daily Note     Today's date: 2025  Patient name: Lisa Canseco  : 1960  MRN: [id number]  Referring provider: Sean Barba MD  Dx:   Encounter Diagnosis     ICD-10-CM    1. Age-related osteoporosis with current pathological fracture with routine healing, subsequent encounter  M80.00XD           Start Time: 0805  Stop Time: 0848  Total time in clinic (min): 43 minutes    Subjective: pt reports that she continues to have increased stiffness and pain when still. Usually first thing in the morning and when laying down going to bed. Notes no pain when up and active.      Objective: See treatment diary below      Assessment: Tolerated treatment well. Pt completed all exercises well with good tolerance and performance. Pt began noting increased ankle discomfort with step ups. Pt elected to hold on exercises due to this. Pt was asked to continue to monitor and consider wearing more supportive sneakers in order to assist in symptom management. Evaluation of symptoms may occur [place] pending need. Patient would benefit from continued PT      Plan: Continue per plan of care.      Precautions:   [Problem List]     [Problem List]  Patient Active Problem List  Diagnosis    Anxiety    Hypothyroidism    Osteoporosis    Cervical disc disorder with radiculopathy of mid-cervical region    Closed fracture of left foot, initial encounter    Compression fracture of T12 vertebra, initial encounter ([facility])    Lumbar radiculopathy     [Past Medical History]    [Past Medical History]  Diagnosis Date    Anxiety     Many years,ocd,meds started year     Cataract, right     Closed fracture of right distal fibula 3/22/2022    Detached retina, right     Disease of thyroid gland     OCD (obsessive compulsive disorder)     Seasonal allergies     Wrist fracture      [Past Surgical History]    [Past Surgical History]  Procedure Laterality Date    CATARACT EXTRACTION      EYE SURGERY      FOOT SURGERY      IR  "KYPHOPLASTY/VERTEBROPLASTY  4/9/2025    MANDIBLE FRACTURE SURGERY      TONSILLECTOMY       Eval/Re-Eval POC Expires Auth #/ Referral # Total Visits Start Date Expiration Date Extension Info Visits Limitation                                                                 1 2 3 4 5 6   5/22 5/29 6/3 6/10 612 FOTO 6/24 RE   7 8 9 10 11 12   7/1 7/8       13 14 15 16 17 18           19 20 21 22 23 24           25 26 27 28 29 30             Access Code: [email or web address]  URL: [email or web address]  Date: 06/10/2025  Prepared by: William Moon    Exercises  - Supine Posterior Pelvic Tilt  - 1-2 x daily - 1-2 sets - 10 reps - 5 hold  - Hooklying Gluteal Sets  - 1-2 x daily - 1-2 sets - 10 reps - 3-5 hold  - Supine Hip Adduction Isometric with Ball  - 1-2 x daily - 1-2 sets - 10 reps - 5 hold  - Seated Scapular Retraction  - 10 x daily - 1-2 sets - 10 reps - 5 hold  - Standing Hip Abduction with Counter Support  - 1-2 x daily - 1-2 sets - 10 reps  - Standing Hip Extension with Counter Support  - 1-2 x daily - 1-2 sets - 10 reps  - Standing Shoulder Row with Anchored Resistance  - 1-2 x daily - 1-2 sets - 10 reps - 2 hold  - Shoulder extension with resistance - Neutral  - 1-2 x daily - 1-2 sets - 10 reps - 2 hold    Pt 1:1 from 810-848  Manuals 5/22 5/29 6/3 6/10 6/12  FOTO 6/24 7/1 7/8                                                         Neuro Re-Ed             PPT 5\" x10 5\"x10 5\"x10 5\" x20 5\" x20        TAB+ hip add iso 5\" x10 5\" x10 5\" x20 5\" x20 5\" x20 5\" x20       TAB+ glute set 5\" x10 5\" x10 mini bridge 5\" 2x10 mini bridge 5\" 2x10 mini bridge 5\" 2x10 mini bridge 5\" 2x10 mini bridge 5\" 2x10 mini bridge 5\" 2x10 mini bridge     TAB+ hip abd  Rtb x10 Gtb x20 Gtb 2x10 Gtb 2x10 Gtb 2x10 Btb 2x10 Btb 2x10     TAB+ marching  X10 ea. X20 ea. X20 ea. SLR 2x10 ea. SLR 2x10 ea. SLR 2x10 SLR 2x10     Scap retract   5\" x10           Band rows   Rtb 2x10 Rtb 2x10   Rtb x10 Rtb 2x10     Band ext   Rtb 2x10 Rtb 2x10   Rtb x10 " "Rtb 2x10                               Ther Ex             LTR  X10 ea. X10 ea. X10 ea.  X10 ea. X10 ea. X10 ea.     Side stepping  Rtb 3 laps 5 laps 5 laps rtb         Step ups  6\" BHR x10 ea. Fwd and lat 6\" BHR x20 ea. Fwd and lat 6\" fwd and lateral x20 ea. B/l 8\" fwd and lateral x20 ea. B/l  8\" fwd and lateral x20 ea. B/l holding #5 DBs 8\" fwd and lateral x20 ea. B/l holding #5 DBs     Mini squats  2x10 2x10 2x10  2x10        bike  4'           HR  x20 x20          Standing hip abd  X20 ea. alt X20 ea. alt X20 ea. alt X20 ea. alt X20 ea. alt X20 ea. alt X20 ea.#1     Standing hip ext   X20 ea. alt X20 ea. alt X20 ea. alt X20 ea. alt X20 ea. alt X20 ea. #1     Monster walks    Rtb 5 laps         Wall slides      5\" x10 ea.       Band tricep ext       Rtb 2x10 Rtb 2x10     Band elbow flexion       Rtb 2x10 Rtb 2x10                               Ther Activity                                       Gait Training                                       Modalities                                                          "

## 2025-07-10 ENCOUNTER — HOSPITAL ENCOUNTER (OUTPATIENT)
Dept: RADIOLOGY | Facility: MEDICAL CENTER | Age: 65
Discharge: HOME | End: 2025-07-10
Payer: COMMERCIAL

## 2025-07-10 ENCOUNTER — OFFICE VISIT (OUTPATIENT)
Dept: PHYSICAL THERAPY | Facility: MEDICAL CENTER | Age: 65
End: 2025-07-10
Attending: RADIOLOGY
Payer: COMMERCIAL

## 2025-07-10 VITALS — BODY MASS INDEX: 27.48 KG/M2 | WEIGHT: 140 LBS | HEIGHT: 60 IN

## 2025-07-10 DIAGNOSIS — M80.00XD AGE-RELATED OSTEOPOROSIS WITH CURRENT PATHOLOGICAL FRACTURE WITH ROUTINE HEALING, SUBSEQUENT ENCOUNTER: Primary | ICD-10-CM

## 2025-07-10 DIAGNOSIS — Z12.31 BREAST CANCER SCREENING BY MAMMOGRAM: ICD-10-CM

## 2025-07-10 PROCEDURE — 97110 THERAPEUTIC EXERCISES: CPT | Performed by: PHYSICAL THERAPIST

## 2025-07-10 PROCEDURE — 77063 BREAST TOMOSYNTHESIS BI: CPT

## 2025-07-10 PROCEDURE — 77067 SCR MAMMO BI INCL CAD: CPT

## 2025-07-10 PROCEDURE — 97112 NEUROMUSCULAR REEDUCATION: CPT | Performed by: PHYSICAL THERAPIST

## 2025-07-10 NOTE — PROGRESS NOTES
Daily Note     Today's date: 7/10/2025  Patient name: Lisa Canseco  : 1960  MRN: [id number]  Referring provider: Sean Barba MD  Dx:   Encounter Diagnosis     ICD-10-CM    1. Age-related osteoporosis with current pathological fracture with routine healing, subsequent encounter  M80.00XD           Start Time: 0820  Stop Time: 0900  Total time in clinic (min): 40 minutes    Subjective: pt reports that she had increased soreness in the upper back due to resistance band exercises. She repots symptoms have reduced to close to baseline.      Objective: See treatment diary below      Assessment: Tolerated treatment well.  Reduction of exercise intensity was completed and tolerated well. Completed UE ROM exercises with good tolerance. Pt was encouraged to continue these at home in order to improve symptoms abut to DC if becoming painful. We will continue to monitor and progress as tolerated.  Patient would benefit from continued PT      Plan: Continue per plan of care.      Precautions:   [Problem List]     [Problem List]  Patient Active Problem List  Diagnosis    Anxiety    Hypothyroidism    Osteoporosis    Cervical disc disorder with radiculopathy of mid-cervical region    Closed fracture of left foot, initial encounter    Compression fracture of T12 vertebra, initial encounter ([facility])    Lumbar radiculopathy     [Past Medical History]    [Past Medical History]  Diagnosis Date    Anxiety     Many years,ocd,meds started year     Cataract, right     Closed fracture of right distal fibula 3/22/2022    Detached retina, right     Disease of thyroid gland     OCD (obsessive compulsive disorder)     Seasonal allergies     Wrist fracture      [Past Surgical History]    [Past Surgical History]  Procedure Laterality Date    CATARACT EXTRACTION      EYE SURGERY      FOOT SURGERY      IR KYPHOPLASTY/VERTEBROPLASTY  2025    MANDIBLE FRACTURE SURGERY      TONSILLECTOMY       Eval/Re-Eval POC Expires Auth #/  "Referral # Total Visits Start Date Expiration Date Extension Info Visits Limitation                                                                 1 2 3 4 5 6   5/22 5/29 6/3 6/10 612 FOTO 6/24 RE   7 8 9 10 11 12   7/1 7/8 7/10      13 14 15 16 17 18           19 20 21 22 23 24           25 26 27 28 29 30             Access Code: [email or web address]  URL: [email or web address]  Date: 06/10/2025  Prepared by: William Moon    Exercises  - Supine Posterior Pelvic Tilt  - 1-2 x daily - 1-2 sets - 10 reps - 5 hold  - Hooklying Gluteal Sets  - 1-2 x daily - 1-2 sets - 10 reps - 3-5 hold  - Supine Hip Adduction Isometric with Ball  - 1-2 x daily - 1-2 sets - 10 reps - 5 hold  - Seated Scapular Retraction  - 10 x daily - 1-2 sets - 10 reps - 5 hold  - Standing Hip Abduction with Counter Support  - 1-2 x daily - 1-2 sets - 10 reps  - Standing Hip Extension with Counter Support  - 1-2 x daily - 1-2 sets - 10 reps  - Standing Shoulder Row with Anchored Resistance  - 1-2 x daily - 1-2 sets - 10 reps - 2 hold  - Shoulder extension with resistance - Neutral  - 1-2 x daily - 1-2 sets - 10 reps - 2 hold    Pt 1:1 from 820-931  Manuals 5/22 5/29 6/3 6/10 6/12  FOTO 6/24 7/1 7/8 7/10                                                        Neuro Re-Ed             PPT 5\" x10 5\"x10 5\"x10 5\" x20 5\" x20        TAB+ hip add iso 5\" x10 5\" x10 5\" x20 5\" x20 5\" x20 5\" x20       TAB+ glute set 5\" x10 5\" x10 mini bridge 5\" 2x10 mini bridge 5\" 2x10 mini bridge 5\" 2x10 mini bridge 5\" 2x10 mini bridge 5\" 2x10 mini bridge 5\" 2x10 mini bridge 5\" 2x10 mini bridge    TAB+ hip abd  Rtb x10 Gtb x20 Gtb 2x10 Gtb 2x10 Gtb 2x10 Btb 2x10 Btb 2x10 Btb 2x10    TAB+ marching  X10 ea. X20 ea. X20 ea. SLR 2x10 ea. SLR 2x10 ea. SLR 2x10 SLR 2x10 SLR 2x10    Scap retract   5\" x10           Band rows   Rtb 2x10 Rtb 2x10   Rtb x10 Rtb 2x10 Hold    Band ext   Rtb 2x10 Rtb 2x10   Rtb x10 Rtb 2x10 hold                              Ther Ex             LTR  X10 ea. " "X10 ea. X10 ea.  X10 ea. X10 ea. X10 ea. X10 ea.    Side stepping  Rtb 3 laps 5 laps 5 laps rtb         Step ups  6\" BHR x10 ea. Fwd and lat 6\" BHR x20 ea. Fwd and lat 6\" fwd and lateral x20 ea. B/l 8\" fwd and lateral x20 ea. B/l  8\" fwd and lateral x20 ea. B/l holding #5 DBs 8\" fwd and lateral x20 ea. B/l holding #5 DBs     Mini squats  2x10 2x10 2x10  2x10        bike  4'           HR  x20 x20          Standing hip abd  X20 ea. alt X20 ea. alt X20 ea. alt X20 ea. alt X20 ea. alt X20 ea. alt X20 ea.#1 X20 ea.    Standing hip ext   X20 ea. alt X20 ea. alt X20 ea. alt X20 ea. alt X20 ea. alt X20 ea. #1 X20 ea.    Monster walks    Rtb 5 laps         Wall slides      5\" x10 ea.   5-10\" 2x10    Band tricep ext       Rtb 2x10 Rtb 2x10     Band elbow flexion       Rtb 2x10 Rtb 2x10     pulleys         3'                              Ther Activity                                       Gait Training                                       Modalities                                                            "

## 2025-07-15 ENCOUNTER — OFFICE VISIT (OUTPATIENT)
Dept: PHYSICAL THERAPY | Facility: MEDICAL CENTER | Age: 65
End: 2025-07-15
Attending: RADIOLOGY
Payer: COMMERCIAL

## 2025-07-15 DIAGNOSIS — M80.00XD AGE-RELATED OSTEOPOROSIS WITH CURRENT PATHOLOGICAL FRACTURE WITH ROUTINE HEALING, SUBSEQUENT ENCOUNTER: Primary | ICD-10-CM

## 2025-07-15 PROCEDURE — 97112 NEUROMUSCULAR REEDUCATION: CPT | Performed by: PHYSICAL THERAPIST

## 2025-07-15 PROCEDURE — 97110 THERAPEUTIC EXERCISES: CPT | Performed by: PHYSICAL THERAPIST

## 2025-07-17 ENCOUNTER — OFFICE VISIT (OUTPATIENT)
Dept: PHYSICAL THERAPY | Facility: MEDICAL CENTER | Age: 65
End: 2025-07-17
Attending: RADIOLOGY
Payer: COMMERCIAL

## 2025-07-17 DIAGNOSIS — M80.00XD AGE-RELATED OSTEOPOROSIS WITH CURRENT PATHOLOGICAL FRACTURE WITH ROUTINE HEALING, SUBSEQUENT ENCOUNTER: Primary | ICD-10-CM

## 2025-07-17 PROCEDURE — 97110 THERAPEUTIC EXERCISES: CPT | Performed by: PHYSICAL THERAPIST

## 2025-07-17 PROCEDURE — 97112 NEUROMUSCULAR REEDUCATION: CPT | Performed by: PHYSICAL THERAPIST

## 2025-07-17 NOTE — PROGRESS NOTES
Daily Note     Today's date: 2025  Patient name: Lisa Canseco  : 1960  MRN: [id number]  Referring provider: Sean Barba MD  Dx:   Encounter Diagnosis     ICD-10-CM    1. Age-related osteoporosis with current pathological fracture with routine healing, subsequent encounter  M80.00XD           Start Time: 820  Stop Time: 0858  Total time in clinic (min): 38 minutes    Subjective: pt reports that she was very busy yesterday doing work and yard work following with good tolerance. Noted soreness in the quads. Other than this she is doing well.       Objective: See treatment diary below      Assessment: Tolerated treatment well. Pt with increased soreness this session due to increased yard work yesterday. However majority of exercises tolerated well. Held on bridges this session due to soreness.  Pt reports that she is returning to prior level of function with good tolerance. Most of symptoms are at rest/when sleeping but progressing. At this time due to pt increasing activity levels with good tolerance and good understanding of HEP, pt and therapist agree to trial independent from PT. Pt's chart will remain open for 2 additional weeks pending need for further PT. If there is no contact in this time, pt will be DC from PT. Patient would benefit from continued PT      Plan: Continue per plan of care.      Precautions:   [Problem List]     [Problem List]  Patient Active Problem List  Diagnosis    Anxiety    Hypothyroidism    Osteoporosis    Cervical disc disorder with radiculopathy of mid-cervical region    Closed fracture of left foot, initial encounter    Compression fracture of T12 vertebra, initial encounter ([facility])    Lumbar radiculopathy     [Past Medical History]    [Past Medical History]  Diagnosis Date    Anxiety     Many years,ocd,meds started year     Cataract, right     Closed fracture of right distal fibula 3/22/2022    Detached retina, right     Disease of thyroid gland     OCD  "(obsessive compulsive disorder)     Seasonal allergies     Wrist fracture      [Past Surgical History]    [Past Surgical History]  Procedure Laterality Date    CATARACT EXTRACTION      EYE SURGERY      FOOT SURGERY      IR KYPHOPLASTY/VERTEBROPLASTY  4/9/2025    MANDIBLE FRACTURE SURGERY      TONSILLECTOMY       Eval/Re-Eval POC Expires Auth #/ Referral # Total Visits Start Date Expiration Date Extension Info Visits Limitation                                                                 1 2 3 4 5 6   5/22 5/29 6/3 6/10 612 FOTO 6/24 RE   7 8 9 10 11 12   7/1 7/8 7/10 7/15 FOTO DC     13 14 15 16 17 18           19 20 21 22 23 24           25 26 27 28 29 30             Access Code: [email or web address]  URL: [email or web address]  Date: 06/10/2025  Prepared by: William Moon    Exercises  - Supine Posterior Pelvic Tilt  - 1-2 x daily - 1-2 sets - 10 reps - 5 hold  - Hooklying Gluteal Sets  - 1-2 x daily - 1-2 sets - 10 reps - 3-5 hold  - Supine Hip Adduction Isometric with Ball  - 1-2 x daily - 1-2 sets - 10 reps - 5 hold  - Seated Scapular Retraction  - 10 x daily - 1-2 sets - 10 reps - 5 hold  - Standing Hip Abduction with Counter Support  - 1-2 x daily - 1-2 sets - 10 reps  - Standing Hip Extension with Counter Support  - 1-2 x daily - 1-2 sets - 10 reps  - Standing Shoulder Row with Anchored Resistance  - 1-2 x daily - 1-2 sets - 10 reps - 2 hold  - Shoulder extension with resistance - Neutral  - 1-2 x daily - 1-2 sets - 10 reps - 2 hold    Pt 1:1 from   Manuals 7/17       7/8 7/10 7/15                                                       Neuro Re-Ed             PPT             TAB+ hip add iso             TAB+ glute set        5\" 2x10 mini bridge 5\" 2x10 mini bridge 5\" 2x10 mini bridge   TAB+ hip abd Blk 2x10       Btb 2x10 Btb 2x10 Btb 2x10   TAB+ marching SLR 2x10       SLR 2x10 SLR 2x10 SLR 2x10   Scap retract              Band rows        Rtb 2x10 Hold hold   Band ext        Rtb 2x10 hold hold          " "                   Ther Ex             LTR X10 ea.       X10 ea. X10 ea. X10 ea.   Side stepping             Step ups 6\" at steps x20 ea. Fwd and lat       8\" fwd and lateral x20 ea. B/l holding #5 DBs  6\" at steps x20 ea. Fwd and lat   Mini squats             bike             HR             Standing hip abd X20 ea       X20 ea.#1 X20 ea. X20 ea   Standing hip ext X20 ea       X20 ea. #1 X20 ea. X20 ea.   Monster walks             Wall slides 5-10\" x20        5-10\" 2x10 5-10\" x20   Band tricep ext        Rtb 2x10     Band elbow flexion        Rtb 2x10     pulleys         3' 5'   mini squats x20         x20                Ther Activity                                       Gait Training                                       Modalities                                                                "

## 2025-07-21 ENCOUNTER — APPOINTMENT (OUTPATIENT)
Dept: PHYSICAL THERAPY | Facility: MEDICAL CENTER | Age: 65
End: 2025-07-21
Attending: RADIOLOGY
Payer: COMMERCIAL

## 2025-07-22 ENCOUNTER — APPOINTMENT (OUTPATIENT)
Dept: PHYSICAL THERAPY | Facility: MEDICAL CENTER | Age: 65
End: 2025-07-22
Attending: RADIOLOGY
Payer: COMMERCIAL

## 2025-07-24 ENCOUNTER — APPOINTMENT (OUTPATIENT)
Dept: PHYSICAL THERAPY | Facility: MEDICAL CENTER | Age: 65
End: 2025-07-24
Attending: RADIOLOGY
Payer: COMMERCIAL

## 2025-07-28 ENCOUNTER — OFFICE VISIT (OUTPATIENT)
Dept: OBGYN CLINIC | Facility: CLINIC | Age: 65
End: 2025-07-28
Payer: COMMERCIAL

## 2025-07-28 VITALS
HEIGHT: 60 IN | SYSTOLIC BLOOD PRESSURE: 144 MMHG | WEIGHT: 139 LBS | DIASTOLIC BLOOD PRESSURE: 80 MMHG | BODY MASS INDEX: 27.29 KG/M2

## 2025-07-28 DIAGNOSIS — Z78.0 POSTMENOPAUSAL: ICD-10-CM

## 2025-07-28 DIAGNOSIS — Z12.39 ENCOUNTER FOR SCREENING BREAST EXAMINATION: ICD-10-CM

## 2025-07-28 DIAGNOSIS — Z11.51 SCREENING FOR HPV (HUMAN PAPILLOMAVIRUS): ICD-10-CM

## 2025-07-28 DIAGNOSIS — Z12.4 CERVICAL CANCER SCREENING: ICD-10-CM

## 2025-07-28 DIAGNOSIS — Z12.31 ENCOUNTER FOR SCREENING MAMMOGRAM FOR MALIGNANT NEOPLASM OF BREAST: ICD-10-CM

## 2025-07-28 DIAGNOSIS — Z12.4 SCREENING FOR MALIGNANT NEOPLASM OF THE CERVIX: ICD-10-CM

## 2025-07-28 DIAGNOSIS — Z01.419 ENCOUNTER FOR WELL WOMAN EXAM: Primary | ICD-10-CM

## 2025-07-28 DIAGNOSIS — Z01.419 ROUTINE GYNECOLOGICAL EXAMINATION: ICD-10-CM

## 2025-07-28 DIAGNOSIS — N81.12 LATERAL CYSTOCELE: ICD-10-CM

## 2025-07-28 PROCEDURE — G0476 HPV COMBO ASSAY CA SCREEN: HCPCS | Performed by: PHYSICIAN ASSISTANT

## 2025-07-28 PROCEDURE — G0145 SCR C/V CYTO,THINLAYER,RESCR: HCPCS | Performed by: PHYSICIAN ASSISTANT

## 2025-07-28 PROCEDURE — S0610 ANNUAL GYNECOLOGICAL EXAMINA: HCPCS | Performed by: PHYSICIAN ASSISTANT

## 2025-07-29 ENCOUNTER — APPOINTMENT (OUTPATIENT)
Dept: PHYSICAL THERAPY | Facility: MEDICAL CENTER | Age: 65
End: 2025-07-29
Attending: RADIOLOGY
Payer: COMMERCIAL

## 2025-07-29 LAB
HPV HR 12 DNA CVX QL NAA+PROBE: NEGATIVE
HPV16 DNA CVX QL NAA+PROBE: NEGATIVE
HPV18 DNA CVX QL NAA+PROBE: NEGATIVE

## 2025-07-31 ENCOUNTER — APPOINTMENT (OUTPATIENT)
Dept: PHYSICAL THERAPY | Facility: MEDICAL CENTER | Age: 65
End: 2025-07-31
Attending: RADIOLOGY
Payer: COMMERCIAL

## 2025-07-31 DIAGNOSIS — L25.5 CONTACT DERMATITIS DUE TO PLANTS, EXCEPT FOOD, UNSPECIFIED CONTACT DERMATITIS TYPE: ICD-10-CM

## 2025-07-31 DIAGNOSIS — L25.5 CONTACT DERMATITIS DUE TO PLANTS, EXCEPT FOOD, UNSPECIFIED CONTACT DERMATITIS TYPE: Primary | ICD-10-CM

## 2025-07-31 RX ORDER — PREDNISONE 10 MG/1
TABLET ORAL
Qty: 26 TABLET | Refills: 0 | Status: SHIPPED | OUTPATIENT
Start: 2025-07-31

## 2025-07-31 RX ORDER — PREDNISONE 10 MG/1
TABLET ORAL
Qty: 26 TABLET | Refills: 0 | Status: SHIPPED | OUTPATIENT
Start: 2025-07-31 | End: 2025-07-31 | Stop reason: CLARIF

## 2025-08-01 LAB
LAB AP GYN PRIMARY INTERPRETATION: NORMAL
Lab: NORMAL

## 2025-08-18 DIAGNOSIS — M81.8 OTHER OSTEOPOROSIS WITHOUT CURRENT PATHOLOGICAL FRACTURE: Primary | ICD-10-CM

## 2025-08-18 DIAGNOSIS — E03.9 ACQUIRED HYPOTHYROIDISM: ICD-10-CM

## 2025-08-22 LAB
ANION GAP SERPL CALCULATED.3IONS-SCNC: 7 MMOL/L (ref 4–13)
BUN SERPL-MCNC: 18 MG/DL (ref 5–25)
CALCIUM SERPL-MCNC: 9.5 MG/DL (ref 8.4–10.2)
CHLORIDE SERPL-SCNC: 103 MMOL/L (ref 96–108)
CO2 SERPL-SCNC: 31 MMOL/L (ref 21–32)
CREAT SERPL-MCNC: 0.63 MG/DL (ref 0.6–1.3)
GFR SERPL CREATININE-BSD FRML MDRD: 95 ML/MIN/1.73SQ M
GLUCOSE P FAST SERPL-MCNC: 84 MG/DL (ref 65–99)
POTASSIUM SERPL-SCNC: 3.9 MMOL/L (ref 3.5–5.3)
SODIUM SERPL-SCNC: 141 MMOL/L (ref 135–147)

## 2025-08-22 PROCEDURE — 36415 COLL VENOUS BLD VENIPUNCTURE: CPT | Performed by: STUDENT IN AN ORGANIZED HEALTH CARE EDUCATION/TRAINING PROGRAM

## 2025-08-22 PROCEDURE — 84443 ASSAY THYROID STIM HORMONE: CPT | Performed by: STUDENT IN AN ORGANIZED HEALTH CARE EDUCATION/TRAINING PROGRAM

## 2025-08-22 PROCEDURE — 80048 BASIC METABOLIC PNL TOTAL CA: CPT | Performed by: STUDENT IN AN ORGANIZED HEALTH CARE EDUCATION/TRAINING PROGRAM

## 2025-08-23 LAB — TSH SERPL DL<=0.05 MIU/L-ACNC: 1.32 UIU/ML (ref 0.45–4.5)

## 2025-08-25 PROBLEM — R73.03 PRE-DIABETES: Status: ACTIVE | Noted: 2025-08-25

## 2025-09-10 NOTE — ASSESSMENT & PLAN NOTE
Await repeat DEXA scan and lab results; await recommendations as per endocrinology.        VSS. Meds given as scheduled. No signs of resp distress. Pts mom and dad came today and mom did her independent off the floor with patient.  Mom was able to collect all emergency equipment needed for taking pt off the floor. Once family came back, dad packed emergency equipment himself and was also able to collect all equipment required for taking pt off the floor. Mom and dad both demonstrated the use of and when to use the ambu bag. Mom states that they will come back tomorrow for dad to complete his independent off the floor as well.